# Patient Record
Sex: MALE | Race: WHITE | NOT HISPANIC OR LATINO | Employment: OTHER | URBAN - METROPOLITAN AREA
[De-identification: names, ages, dates, MRNs, and addresses within clinical notes are randomized per-mention and may not be internally consistent; named-entity substitution may affect disease eponyms.]

---

## 2020-01-01 ENCOUNTER — APPOINTMENT (OUTPATIENT)
Dept: GENERAL RADIOLOGY | Facility: HOSPITAL | Age: 75
End: 2020-01-01

## 2020-01-01 ENCOUNTER — HOSPITAL ENCOUNTER (EMERGENCY)
Facility: HOSPITAL | Age: 75
Discharge: HOME | End: 2020-03-31
Attending: EMERGENCY MEDICINE | Admitting: EMERGENCY MEDICINE

## 2020-01-01 ENCOUNTER — HOSPITAL ENCOUNTER (EMERGENCY)
Facility: HOSPITAL | Age: 75
Discharge: HOME | End: 2020-03-29
Attending: EMERGENCY MEDICINE | Admitting: EMERGENCY MEDICINE

## 2020-01-01 ENCOUNTER — HOSPITAL ENCOUNTER (EMERGENCY)
Facility: HOSPITAL | Age: 75
Discharge: LEFT BEFORE DISCHARGE | End: 2020-04-15
Attending: EMERGENCY MEDICINE | Admitting: EMERGENCY MEDICINE

## 2020-01-01 ENCOUNTER — HOSPITAL ENCOUNTER (EMERGENCY)
Facility: HOSPITAL | Age: 75
Discharge: HOME | End: 2020-04-18
Attending: EMERGENCY MEDICINE | Admitting: EMERGENCY MEDICINE

## 2020-01-01 ENCOUNTER — HOSPITAL ENCOUNTER (EMERGENCY)
Facility: HOSPITAL | Age: 75
Discharge: HOME | End: 2020-03-26
Attending: EMERGENCY MEDICINE | Admitting: EMERGENCY MEDICINE

## 2020-01-01 ENCOUNTER — HOSPITAL ENCOUNTER (EMERGENCY)
Facility: HOSPITAL | Age: 75
Discharge: HOME | End: 2020-04-17
Attending: EMERGENCY MEDICINE | Admitting: EMERGENCY MEDICINE

## 2020-01-01 ENCOUNTER — HOSPITAL ENCOUNTER (INPATIENT)
Facility: HOSPITAL | Age: 75
LOS: 7 days | Discharge: HOME HEALTH | End: 2020-03-16
Attending: EMERGENCY MEDICINE | Admitting: HOSPITALIST

## 2020-01-01 ENCOUNTER — PATIENT OUTREACH (OUTPATIENT)
Dept: CASE MANAGEMENT | Facility: OTHER | Age: 75
End: 2020-01-01

## 2020-01-01 ENCOUNTER — APPOINTMENT (OUTPATIENT)
Dept: CT IMAGING | Facility: HOSPITAL | Age: 75
End: 2020-01-01

## 2020-01-01 ENCOUNTER — HOSPITAL ENCOUNTER (INPATIENT)
Facility: HOSPITAL | Age: 75
LOS: 16 days | Discharge: HOSPICE | End: 2020-05-09
Attending: EMERGENCY MEDICINE | Admitting: HOSPITALIST

## 2020-01-01 ENCOUNTER — EPISODE CHANGES (OUTPATIENT)
Dept: CASE MANAGEMENT | Facility: OTHER | Age: 75
End: 2020-01-01

## 2020-01-01 ENCOUNTER — HOSPITAL ENCOUNTER (INPATIENT)
Facility: HOSPITAL | Age: 75
LOS: 5 days | Discharge: HOME HEALTH | End: 2020-03-24
Attending: EMERGENCY MEDICINE | Admitting: INTERNAL MEDICINE

## 2020-01-01 ENCOUNTER — HOSPITAL ENCOUNTER (EMERGENCY)
Facility: HOSPITAL | Age: 75
Discharge: HOME | End: 2020-04-14
Attending: EMERGENCY MEDICINE | Admitting: EMERGENCY MEDICINE

## 2020-01-01 ENCOUNTER — HOSPITAL ENCOUNTER (EMERGENCY)
Facility: HOSPITAL | Age: 75
Discharge: HOME HEALTH | End: 2020-04-12
Attending: EMERGENCY MEDICINE | Admitting: EMERGENCY MEDICINE

## 2020-01-01 ENCOUNTER — HOSPITAL ENCOUNTER (EMERGENCY)
Facility: HOSPITAL | Age: 75
Discharge: HOME | End: 2020-04-16
Attending: EMERGENCY MEDICINE

## 2020-01-01 ENCOUNTER — READMISSION MANAGEMENT (OUTPATIENT)
Dept: CALL CENTER | Facility: HOSPITAL | Age: 75
End: 2020-01-01

## 2020-01-01 ENCOUNTER — DOCUMENTATION (OUTPATIENT)
Dept: SOCIAL WORK | Facility: HOSPITAL | Age: 75
End: 2020-01-01

## 2020-01-01 ENCOUNTER — HOSPITAL ENCOUNTER (EMERGENCY)
Facility: HOSPITAL | Age: 75
Discharge: LEFT BEFORE DISCHARGE | End: 2020-04-20

## 2020-01-01 ENCOUNTER — ANESTHESIA (OUTPATIENT)
Dept: GASTROENTEROLOGY | Facility: HOSPITAL | Age: 75
End: 2020-01-01

## 2020-01-01 ENCOUNTER — HOSPITAL ENCOUNTER (EMERGENCY)
Facility: HOSPITAL | Age: 75
Discharge: HOME | End: 2020-04-08
Attending: EMERGENCY MEDICINE | Admitting: EMERGENCY MEDICINE

## 2020-01-01 ENCOUNTER — HOSPITAL ENCOUNTER (INPATIENT)
Facility: HOSPITAL | Age: 75
LOS: 2 days | End: 2020-05-11
Attending: INTERNAL MEDICINE | Admitting: INTERNAL MEDICINE

## 2020-01-01 ENCOUNTER — ANESTHESIA EVENT (OUTPATIENT)
Dept: GASTROENTEROLOGY | Facility: HOSPITAL | Age: 75
End: 2020-01-01

## 2020-01-01 VITALS
SYSTOLIC BLOOD PRESSURE: 118 MMHG | TEMPERATURE: 97.3 F | OXYGEN SATURATION: 97 % | HEART RATE: 65 BPM | RESPIRATION RATE: 18 BRPM | DIASTOLIC BLOOD PRESSURE: 65 MMHG

## 2020-01-01 VITALS
WEIGHT: 165 LBS | RESPIRATION RATE: 18 BRPM | HEART RATE: 73 BPM | DIASTOLIC BLOOD PRESSURE: 67 MMHG | BODY MASS INDEX: 23.62 KG/M2 | HEIGHT: 70 IN | SYSTOLIC BLOOD PRESSURE: 126 MMHG | TEMPERATURE: 99.1 F | OXYGEN SATURATION: 100 %

## 2020-01-01 VITALS
RESPIRATION RATE: 16 BRPM | SYSTOLIC BLOOD PRESSURE: 116 MMHG | HEIGHT: 70 IN | DIASTOLIC BLOOD PRESSURE: 58 MMHG | TEMPERATURE: 97.9 F | HEART RATE: 66 BPM | BODY MASS INDEX: 18.07 KG/M2 | OXYGEN SATURATION: 83 % | WEIGHT: 126.2 LBS

## 2020-01-01 VITALS
DIASTOLIC BLOOD PRESSURE: 74 MMHG | TEMPERATURE: 97.9 F | HEART RATE: 74 BPM | RESPIRATION RATE: 16 BRPM | SYSTOLIC BLOOD PRESSURE: 143 MMHG | OXYGEN SATURATION: 99 %

## 2020-01-01 VITALS
BODY MASS INDEX: 21.66 KG/M2 | TEMPERATURE: 98.4 F | OXYGEN SATURATION: 99 % | SYSTOLIC BLOOD PRESSURE: 102 MMHG | WEIGHT: 130 LBS | HEIGHT: 65 IN | RESPIRATION RATE: 20 BRPM | DIASTOLIC BLOOD PRESSURE: 55 MMHG | HEART RATE: 89 BPM

## 2020-01-01 VITALS
RESPIRATION RATE: 18 BRPM | SYSTOLIC BLOOD PRESSURE: 148 MMHG | WEIGHT: 117.7 LBS | HEART RATE: 79 BPM | OXYGEN SATURATION: 99 % | BODY MASS INDEX: 16.85 KG/M2 | TEMPERATURE: 98.6 F | HEIGHT: 70 IN | DIASTOLIC BLOOD PRESSURE: 80 MMHG

## 2020-01-01 VITALS
HEART RATE: 74 BPM | RESPIRATION RATE: 18 BRPM | BODY MASS INDEX: 23.62 KG/M2 | WEIGHT: 165 LBS | TEMPERATURE: 97.8 F | OXYGEN SATURATION: 95 % | DIASTOLIC BLOOD PRESSURE: 75 MMHG | HEIGHT: 70 IN | SYSTOLIC BLOOD PRESSURE: 134 MMHG

## 2020-01-01 VITALS
BODY MASS INDEX: 27.49 KG/M2 | RESPIRATION RATE: 18 BRPM | OXYGEN SATURATION: 99 % | TEMPERATURE: 98 F | HEART RATE: 70 BPM | SYSTOLIC BLOOD PRESSURE: 118 MMHG | DIASTOLIC BLOOD PRESSURE: 66 MMHG | HEIGHT: 65 IN | WEIGHT: 165 LBS

## 2020-01-01 VITALS
HEART RATE: 110 BPM | OXYGEN SATURATION: 92 % | DIASTOLIC BLOOD PRESSURE: 66 MMHG | TEMPERATURE: 103.9 F | SYSTOLIC BLOOD PRESSURE: 137 MMHG | RESPIRATION RATE: 20 BRPM

## 2020-01-01 VITALS
HEART RATE: 68 BPM | TEMPERATURE: 97.1 F | RESPIRATION RATE: 18 BRPM | OXYGEN SATURATION: 96 % | DIASTOLIC BLOOD PRESSURE: 97 MMHG | SYSTOLIC BLOOD PRESSURE: 107 MMHG

## 2020-01-01 VITALS
HEIGHT: 70 IN | OXYGEN SATURATION: 97 % | SYSTOLIC BLOOD PRESSURE: 126 MMHG | HEART RATE: 74 BPM | TEMPERATURE: 96.3 F | BODY MASS INDEX: 18.04 KG/M2 | RESPIRATION RATE: 18 BRPM | DIASTOLIC BLOOD PRESSURE: 68 MMHG | WEIGHT: 126 LBS

## 2020-01-01 VITALS
TEMPERATURE: 98.6 F | RESPIRATION RATE: 20 BRPM | HEART RATE: 64 BPM | OXYGEN SATURATION: 100 % | SYSTOLIC BLOOD PRESSURE: 133 MMHG | DIASTOLIC BLOOD PRESSURE: 72 MMHG

## 2020-01-01 VITALS
OXYGEN SATURATION: 90 % | SYSTOLIC BLOOD PRESSURE: 145 MMHG | HEIGHT: 65 IN | RESPIRATION RATE: 18 BRPM | DIASTOLIC BLOOD PRESSURE: 72 MMHG | TEMPERATURE: 97.6 F | HEART RATE: 89 BPM | BODY MASS INDEX: 21.77 KG/M2 | WEIGHT: 130.7 LBS

## 2020-01-01 VITALS
SYSTOLIC BLOOD PRESSURE: 118 MMHG | RESPIRATION RATE: 18 BRPM | HEART RATE: 60 BPM | DIASTOLIC BLOOD PRESSURE: 69 MMHG | BODY MASS INDEX: 18.04 KG/M2 | TEMPERATURE: 97.5 F | OXYGEN SATURATION: 100 % | WEIGHT: 126 LBS | HEIGHT: 70 IN

## 2020-01-01 VITALS
DIASTOLIC BLOOD PRESSURE: 79 MMHG | HEART RATE: 76 BPM | TEMPERATURE: 98.8 F | SYSTOLIC BLOOD PRESSURE: 136 MMHG | OXYGEN SATURATION: 99 % | RESPIRATION RATE: 16 BRPM

## 2020-01-01 DIAGNOSIS — S00.83XA CONTUSION OF FOREHEAD, INITIAL ENCOUNTER: ICD-10-CM

## 2020-01-01 DIAGNOSIS — Z87.828 HISTORY OF GUNSHOT WOUND: ICD-10-CM

## 2020-01-01 DIAGNOSIS — E87.1 HYPONATREMIA: ICD-10-CM

## 2020-01-01 DIAGNOSIS — D64.9 CHRONIC ANEMIA: ICD-10-CM

## 2020-01-01 DIAGNOSIS — Z98.890 H/O TRACHEOSTOMY: ICD-10-CM

## 2020-01-01 DIAGNOSIS — E87.0 HYPERNATREMIA: ICD-10-CM

## 2020-01-01 DIAGNOSIS — T79.6XXA TRAUMATIC RHABDOMYOLYSIS, INITIAL ENCOUNTER (HCC): ICD-10-CM

## 2020-01-01 DIAGNOSIS — R10.9 ACUTE ABDOMINAL PAIN: Primary | ICD-10-CM
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
## 2020-01-01 DIAGNOSIS — J06.9 UPPER RESPIRATORY TRACT INFECTION, UNSPECIFIED TYPE: ICD-10-CM

## 2020-01-01 DIAGNOSIS — E87.1 HYPONATREMIA: Primary | ICD-10-CM

## 2020-01-01 DIAGNOSIS — S70.02XA CONTUSION OF LEFT HIP, INITIAL ENCOUNTER: ICD-10-CM

## 2020-01-01 DIAGNOSIS — R53.1 GENERAL WEAKNESS: ICD-10-CM

## 2020-01-01 DIAGNOSIS — R29.6 FREQUENT FALLS: Primary | ICD-10-CM

## 2020-01-01 DIAGNOSIS — D50.0 IRON DEFICIENCY ANEMIA DUE TO CHRONIC BLOOD LOSS: ICD-10-CM

## 2020-01-01 DIAGNOSIS — R07.89 CHEST WALL PAIN: Primary | ICD-10-CM

## 2020-01-01 DIAGNOSIS — S00.03XA CONTUSION OF SCALP, INITIAL ENCOUNTER: Primary | ICD-10-CM

## 2020-01-01 DIAGNOSIS — R10.12 LEFT UPPER QUADRANT PAIN: Primary | ICD-10-CM

## 2020-01-01 DIAGNOSIS — T07.XXXA ABRASIONS OF MULTIPLE SITES: ICD-10-CM

## 2020-01-01 DIAGNOSIS — R10.84 GENERALIZED ABDOMINAL PAIN: Primary | ICD-10-CM

## 2020-01-01 DIAGNOSIS — Z43.1 ATTENTION TO GASTROSTOMY TUBE (HCC): Primary | ICD-10-CM
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
## 2020-01-01 DIAGNOSIS — R53.1 GENERALIZED WEAKNESS: ICD-10-CM

## 2020-01-01 DIAGNOSIS — M25.552 ACUTE HIP PAIN, LEFT: Primary | ICD-10-CM

## 2020-01-01 DIAGNOSIS — D64.9 ANEMIA, UNSPECIFIED TYPE: Primary | ICD-10-CM

## 2020-01-01 DIAGNOSIS — K56.41 IMPACTED STOOL IN RECTUM (HCC): ICD-10-CM

## 2020-01-01 DIAGNOSIS — R53.1 GENERALIZED WEAKNESS: Primary | ICD-10-CM

## 2020-01-01 DIAGNOSIS — S51.811A SKIN TEAR OF RIGHT FOREARM WITHOUT COMPLICATION, INITIAL ENCOUNTER: ICD-10-CM

## 2020-01-01 LAB
25(OH)D3 SERPL-MCNC: 29.9 NG/ML (ref 30–100)
ABO + RH BLD: NORMAL
ABO GROUP BLD: NORMAL
ACANTHOCYTES BLD QL SMEAR: ABNORMAL
ACANTHOCYTES BLD QL SMEAR: ABNORMAL
ALBUMIN SERPL-MCNC: 2.1 G/DL (ref 3.5–5.2)
ALBUMIN SERPL-MCNC: 2.3 G/DL (ref 3.5–5.2)
ALBUMIN SERPL-MCNC: 2.5 G/DL (ref 3.5–5.2)
ALBUMIN SERPL-MCNC: 2.6 G/DL (ref 3.5–5.2)
ALBUMIN SERPL-MCNC: 2.6 G/DL (ref 3.5–5.2)
ALBUMIN SERPL-MCNC: 2.8 G/DL (ref 3.5–5.2)
ALBUMIN SERPL-MCNC: 3.5 G/DL (ref 3.5–5.2)
ALBUMIN SERPL-MCNC: 3.6 G/DL (ref 3.5–5.2)
ALBUMIN SERPL-MCNC: 3.8 G/DL (ref 3.5–5.2)
ALBUMIN SERPL-MCNC: 3.9 G/DL (ref 3.5–5.2)
ALBUMIN SERPL-MCNC: 4 G/DL (ref 3.5–5.2)
ALBUMIN SERPL-MCNC: 4.1 G/DL (ref 3.5–5.2)
ALBUMIN SERPL-MCNC: 4.2 G/DL (ref 3.5–5.2)
ALBUMIN SERPL-MCNC: 4.2 G/DL (ref 3.5–5.2)
ALBUMIN/GLOB SERPL: 0.9 G/DL
ALBUMIN/GLOB SERPL: 1.1 G/DL
ALBUMIN/GLOB SERPL: 1.2 G/DL
ALBUMIN/GLOB SERPL: 1.3 G/DL
ALBUMIN/GLOB SERPL: 1.3 G/DL
ALBUMIN/GLOB SERPL: 1.6 G/DL
ALBUMIN/GLOB SERPL: 1.7 G/DL
ALBUMIN/GLOB SERPL: 2 G/DL
ALP SERPL-CCNC: 100 U/L (ref 39–117)
ALP SERPL-CCNC: 114 U/L (ref 39–117)
ALP SERPL-CCNC: 122 U/L (ref 39–117)
ALP SERPL-CCNC: 131 U/L (ref 39–117)
ALP SERPL-CCNC: 136 U/L (ref 39–117)
ALP SERPL-CCNC: 154 U/L (ref 39–117)
ALP SERPL-CCNC: 79 U/L (ref 39–117)
ALP SERPL-CCNC: 81 U/L (ref 39–117)
ALP SERPL-CCNC: 83 U/L (ref 39–117)
ALP SERPL-CCNC: 94 U/L (ref 39–117)
ALT SERPL W P-5'-P-CCNC: 13 U/L (ref 1–41)
ALT SERPL W P-5'-P-CCNC: 14 U/L (ref 1–41)
ALT SERPL W P-5'-P-CCNC: 15 U/L (ref 1–41)
ALT SERPL W P-5'-P-CCNC: 15 U/L (ref 1–41)
ALT SERPL W P-5'-P-CCNC: 17 U/L (ref 1–41)
ALT SERPL W P-5'-P-CCNC: 18 U/L (ref 1–41)
ALT SERPL W P-5'-P-CCNC: 18 U/L (ref 1–41)
ALT SERPL W P-5'-P-CCNC: 32 U/L (ref 1–41)
ALT SERPL W P-5'-P-CCNC: 34 U/L (ref 1–41)
ALT SERPL W P-5'-P-CCNC: 50 U/L (ref 1–41)
ANION GAP SERPL CALCULATED.3IONS-SCNC: 10.1 MMOL/L (ref 5–15)
ANION GAP SERPL CALCULATED.3IONS-SCNC: 10.6 MMOL/L (ref 5–15)
ANION GAP SERPL CALCULATED.3IONS-SCNC: 10.7 MMOL/L (ref 5–15)
ANION GAP SERPL CALCULATED.3IONS-SCNC: 10.8 MMOL/L (ref 5–15)
ANION GAP SERPL CALCULATED.3IONS-SCNC: 10.8 MMOL/L (ref 5–15)
ANION GAP SERPL CALCULATED.3IONS-SCNC: 10.9 MMOL/L (ref 5–15)
ANION GAP SERPL CALCULATED.3IONS-SCNC: 11.4 MMOL/L (ref 5–15)
ANION GAP SERPL CALCULATED.3IONS-SCNC: 11.5 MMOL/L (ref 5–15)
ANION GAP SERPL CALCULATED.3IONS-SCNC: 11.7 MMOL/L (ref 5–15)
ANION GAP SERPL CALCULATED.3IONS-SCNC: 11.9 MMOL/L (ref 5–15)
ANION GAP SERPL CALCULATED.3IONS-SCNC: 11.9 MMOL/L (ref 5–15)
ANION GAP SERPL CALCULATED.3IONS-SCNC: 12.1 MMOL/L (ref 5–15)
ANION GAP SERPL CALCULATED.3IONS-SCNC: 12.5 MMOL/L (ref 5–15)
ANION GAP SERPL CALCULATED.3IONS-SCNC: 12.6 MMOL/L (ref 5–15)
ANION GAP SERPL CALCULATED.3IONS-SCNC: 13.7 MMOL/L (ref 5–15)
ANION GAP SERPL CALCULATED.3IONS-SCNC: 15.7 MMOL/L (ref 5–15)
ANION GAP SERPL CALCULATED.3IONS-SCNC: 16.1 MMOL/L (ref 5–15)
ANION GAP SERPL CALCULATED.3IONS-SCNC: 16.6 MMOL/L (ref 5–15)
ANION GAP SERPL CALCULATED.3IONS-SCNC: 21.1 MMOL/L (ref 5–15)
ANION GAP SERPL CALCULATED.3IONS-SCNC: 5.2 MMOL/L (ref 5–15)
ANION GAP SERPL CALCULATED.3IONS-SCNC: 6.1 MMOL/L (ref 5–15)
ANION GAP SERPL CALCULATED.3IONS-SCNC: 6.6 MMOL/L (ref 5–15)
ANION GAP SERPL CALCULATED.3IONS-SCNC: 6.6 MMOL/L (ref 5–15)
ANION GAP SERPL CALCULATED.3IONS-SCNC: 7.5 MMOL/L (ref 5–15)
ANION GAP SERPL CALCULATED.3IONS-SCNC: 8.1 MMOL/L (ref 5–15)
ANION GAP SERPL CALCULATED.3IONS-SCNC: 9 MMOL/L (ref 5–15)
ANION GAP SERPL CALCULATED.3IONS-SCNC: 9.1 MMOL/L (ref 5–15)
ANION GAP SERPL CALCULATED.3IONS-SCNC: 9.1 MMOL/L (ref 5–15)
ANION GAP SERPL CALCULATED.3IONS-SCNC: 9.3 MMOL/L (ref 5–15)
ANION GAP SERPL CALCULATED.3IONS-SCNC: 9.3 MMOL/L (ref 5–15)
ANION GAP SERPL CALCULATED.3IONS-SCNC: 9.4 MMOL/L (ref 5–15)
ANION GAP SERPL CALCULATED.3IONS-SCNC: 9.5 MMOL/L (ref 5–15)
ANION GAP SERPL CALCULATED.3IONS-SCNC: 9.7 MMOL/L (ref 5–15)
ANION GAP SERPL CALCULATED.3IONS-SCNC: 9.8 MMOL/L (ref 5–15)
ANION GAP SERPL CALCULATED.3IONS-SCNC: 9.9 MMOL/L (ref 5–15)
ANION GAP SERPL CALCULATED.3IONS-SCNC: 9.9 MMOL/L (ref 5–15)
ANISOCYTOSIS BLD QL: ABNORMAL
ANISOCYTOSIS BLD QL: NORMAL
ANISOCYTOSIS BLD QL: NORMAL
ANTI-C: NORMAL
ANTI-D: NORMAL
ARTERIAL PATENCY WRIST A: POSITIVE
ARTERIAL PATENCY WRIST A: POSITIVE
AST SERPL-CCNC: 145 U/L (ref 1–40)
AST SERPL-CCNC: 18 U/L (ref 1–40)
AST SERPL-CCNC: 19 U/L (ref 1–40)
AST SERPL-CCNC: 20 U/L (ref 1–40)
AST SERPL-CCNC: 21 U/L (ref 1–40)
AST SERPL-CCNC: 27 U/L (ref 1–40)
AST SERPL-CCNC: 28 U/L (ref 1–40)
AST SERPL-CCNC: 44 U/L (ref 1–40)
ATMOSPHERIC PRESS: 743.9 MMHG
ATMOSPHERIC PRESS: 748.9 MMHG
B PARAPERT DNA SPEC QL NAA+PROBE: NOT DETECTED
B PERT DNA SPEC QL NAA+PROBE: NOT DETECTED
BACTERIA SPEC AEROBE CULT: NO GROWTH
BACTERIA SPEC AEROBE CULT: NORMAL
BACTERIA SPEC AEROBE CULT: NORMAL
BACTERIA SPEC RESP CULT: ABNORMAL
BACTERIA UR QL AUTO: ABNORMAL /HPF
BACTERIA UR QL AUTO: ABNORMAL /HPF
BACTERIA UR QL AUTO: NORMAL /HPF
BASE EXCESS BLDA CALC-SCNC: 1.8 MMOL/L (ref 0–2)
BASE EXCESS BLDA CALC-SCNC: 1.9 MMOL/L (ref 0–2)
BASOPHILS # BLD AUTO: 0.03 10*3/MM3 (ref 0–0.2)
BASOPHILS # BLD AUTO: 0.04 10*3/MM3 (ref 0–0.2)
BASOPHILS # BLD AUTO: 0.05 10*3/MM3 (ref 0–0.2)
BASOPHILS # BLD AUTO: 0.07 10*3/MM3 (ref 0–0.2)
BASOPHILS # BLD AUTO: 0.09 10*3/MM3 (ref 0–0.2)
BASOPHILS # BLD MANUAL: 0.07 10*3/MM3 (ref 0–0.2)
BASOPHILS # BLD MANUAL: 0.13 10*3/MM3 (ref 0–0.2)
BASOPHILS # BLD MANUAL: 0.15 10*3/MM3 (ref 0–0.2)
BASOPHILS # BLD MANUAL: 0.24 10*3/MM3 (ref 0–0.2)
BASOPHILS # BLD MANUAL: 0.24 10*3/MM3 (ref 0–0.2)
BASOPHILS NFR BLD AUTO: 0.2 % (ref 0–1.5)
BASOPHILS NFR BLD AUTO: 0.3 % (ref 0–1.5)
BASOPHILS NFR BLD AUTO: 0.6 % (ref 0–1.5)
BASOPHILS NFR BLD AUTO: 0.6 % (ref 0–1.5)
BASOPHILS NFR BLD AUTO: 1 % (ref 0–1.5)
BASOPHILS NFR BLD AUTO: 1 % (ref 0–1.5)
BASOPHILS NFR BLD AUTO: 2 % (ref 0–1.5)
BASOPHILS NFR BLD AUTO: 2 % (ref 0–1.5)
BASOPHILS NFR BLD AUTO: 3 % (ref 0–1.5)
BASOPHILS NFR BLD AUTO: 3 % (ref 0–1.5)
BDY SITE: ABNORMAL
BDY SITE: ABNORMAL
BH BB BLOOD EXPIRATION DATE: NORMAL
BH BB BLOOD TYPE BARCODE: 9500
BH BB DISPENSE STATUS: NORMAL
BH BB PRODUCT CODE: NORMAL
BH BB UNIT NUMBER: NORMAL
BILIRUB SERPL-MCNC: 0.3 MG/DL (ref 0.2–1.2)
BILIRUB SERPL-MCNC: 0.5 MG/DL (ref 0.2–1.2)
BILIRUB SERPL-MCNC: 0.5 MG/DL (ref 0.2–1.2)
BILIRUB SERPL-MCNC: 0.6 MG/DL (ref 0.2–1.2)
BILIRUB SERPL-MCNC: 0.7 MG/DL (ref 0.2–1.2)
BILIRUB SERPL-MCNC: 0.7 MG/DL (ref 0.2–1.2)
BILIRUB UR QL STRIP: NEGATIVE
BLD GP AB SCN SERPL QL: POSITIVE
BUN BLD-MCNC: 15 MG/DL (ref 8–23)
BUN BLD-MCNC: 16 MG/DL (ref 8–23)
BUN BLD-MCNC: 17 MG/DL (ref 8–23)
BUN BLD-MCNC: 18 MG/DL (ref 8–23)
BUN BLD-MCNC: 18 MG/DL (ref 8–23)
BUN BLD-MCNC: 20 MG/DL (ref 8–23)
BUN BLD-MCNC: 21 MG/DL (ref 8–23)
BUN BLD-MCNC: 21 MG/DL (ref 8–23)
BUN BLD-MCNC: 22 MG/DL (ref 8–23)
BUN BLD-MCNC: 23 MG/DL (ref 8–23)
BUN BLD-MCNC: 24 MG/DL (ref 8–23)
BUN BLD-MCNC: 25 MG/DL (ref 8–23)
BUN BLD-MCNC: 25 MG/DL (ref 8–23)
BUN BLD-MCNC: 27 MG/DL (ref 8–23)
BUN BLD-MCNC: 29 MG/DL (ref 8–23)
BUN BLD-MCNC: 30 MG/DL (ref 8–23)
BUN BLD-MCNC: 31 MG/DL (ref 8–23)
BUN BLD-MCNC: 31 MG/DL (ref 8–23)
BUN BLD-MCNC: 32 MG/DL (ref 8–23)
BUN BLD-MCNC: 33 MG/DL (ref 8–23)
BUN BLD-MCNC: 34 MG/DL (ref 8–23)
BUN BLD-MCNC: 37 MG/DL (ref 8–23)
BUN BLD-MCNC: 37 MG/DL (ref 8–23)
BUN BLD-MCNC: 39 MG/DL (ref 8–23)
BUN BLD-MCNC: 41 MG/DL (ref 8–23)
BUN BLD-MCNC: 42 MG/DL (ref 8–23)
BUN BLD-MCNC: 43 MG/DL (ref 8–23)
BUN BLD-MCNC: 47 MG/DL (ref 8–23)
BUN BLD-MCNC: 50 MG/DL (ref 8–23)
BUN BLD-MCNC: 51 MG/DL (ref 8–23)
BUN BLD-MCNC: 54 MG/DL (ref 8–23)
BUN BLD-MCNC: 58 MG/DL (ref 8–23)
BUN BLD-MCNC: 60 MG/DL (ref 8–23)
BUN BLD-MCNC: 62 MG/DL (ref 8–23)
BUN BLD-MCNC: 70 MG/DL (ref 8–23)
BUN BLD-MCNC: 84 MG/DL (ref 8–23)
BUN/CREAT SERPL: 100 (ref 7–25)
BUN/CREAT SERPL: 30.6 (ref 7–25)
BUN/CREAT SERPL: 34.3 (ref 7–25)
BUN/CREAT SERPL: 37.8 (ref 7–25)
BUN/CREAT SERPL: 38.1 (ref 7–25)
BUN/CREAT SERPL: 39 (ref 7–25)
BUN/CREAT SERPL: 40 (ref 7–25)
BUN/CREAT SERPL: 40.2 (ref 7–25)
BUN/CREAT SERPL: 40.3 (ref 7–25)
BUN/CREAT SERPL: 42.4 (ref 7–25)
BUN/CREAT SERPL: 43.5 (ref 7–25)
BUN/CREAT SERPL: 45.8 (ref 7–25)
BUN/CREAT SERPL: 46.2 (ref 7–25)
BUN/CREAT SERPL: 46.2 (ref 7–25)
BUN/CREAT SERPL: 50 (ref 7–25)
BUN/CREAT SERPL: 54 (ref 7–25)
BUN/CREAT SERPL: 54.5 (ref 7–25)
BUN/CREAT SERPL: 55.3 (ref 7–25)
BUN/CREAT SERPL: 57.4 (ref 7–25)
BUN/CREAT SERPL: 57.4 (ref 7–25)
BUN/CREAT SERPL: 58.3 (ref 7–25)
BUN/CREAT SERPL: 59 (ref 7–25)
BUN/CREAT SERPL: 59.2 (ref 7–25)
BUN/CREAT SERPL: 60 (ref 7–25)
BUN/CREAT SERPL: 61.2 (ref 7–25)
BUN/CREAT SERPL: 68.9 (ref 7–25)
BUN/CREAT SERPL: 73.9 (ref 7–25)
BUN/CREAT SERPL: 76.2 (ref 7–25)
BUN/CREAT SERPL: 77.1 (ref 7–25)
BUN/CREAT SERPL: 77.5 (ref 7–25)
BUN/CREAT SERPL: 77.8 (ref 7–25)
BUN/CREAT SERPL: 78.2 (ref 7–25)
BUN/CREAT SERPL: 82 (ref 7–25)
BUN/CREAT SERPL: 83.9 (ref 7–25)
BUN/CREAT SERPL: 84.4 (ref 7–25)
BUN/CREAT SERPL: 85.4 (ref 7–25)
BUN/CREAT SERPL: 87.7 (ref 7–25)
BUN/CREAT SERPL: 95.2 (ref 7–25)
BURR CELLS BLD QL SMEAR: ABNORMAL
C AG RBC QL: NEGATIVE
C PNEUM DNA NPH QL NAA+NON-PROBE: NOT DETECTED
C3 FRG RBC-MCNC: NORMAL
CALCIUM SPEC-SCNC: 7.7 MG/DL (ref 8.6–10.5)
CALCIUM SPEC-SCNC: 8 MG/DL (ref 8.6–10.5)
CALCIUM SPEC-SCNC: 8.1 MG/DL (ref 8.6–10.5)
CALCIUM SPEC-SCNC: 8.2 MG/DL (ref 8.6–10.5)
CALCIUM SPEC-SCNC: 8.3 MG/DL (ref 8.6–10.5)
CALCIUM SPEC-SCNC: 8.4 MG/DL (ref 8.6–10.5)
CALCIUM SPEC-SCNC: 8.5 MG/DL (ref 8.6–10.5)
CALCIUM SPEC-SCNC: 8.6 MG/DL (ref 8.6–10.5)
CALCIUM SPEC-SCNC: 8.7 MG/DL (ref 8.6–10.5)
CALCIUM SPEC-SCNC: 8.7 MG/DL (ref 8.6–10.5)
CALCIUM SPEC-SCNC: 8.8 MG/DL (ref 8.6–10.5)
CALCIUM SPEC-SCNC: 8.9 MG/DL (ref 8.6–10.5)
CALCIUM SPEC-SCNC: 9 MG/DL (ref 8.6–10.5)
CALCIUM SPEC-SCNC: 9.1 MG/DL (ref 8.6–10.5)
CALCIUM SPEC-SCNC: 9.1 MG/DL (ref 8.6–10.5)
CALCIUM SPEC-SCNC: 9.3 MG/DL (ref 8.6–10.5)
CALCIUM SPEC-SCNC: 9.4 MG/DL (ref 8.6–10.5)
CHLORIDE SERPL-SCNC: 100 MMOL/L (ref 98–107)
CHLORIDE SERPL-SCNC: 101 MMOL/L (ref 98–107)
CHLORIDE SERPL-SCNC: 101 MMOL/L (ref 98–107)
CHLORIDE SERPL-SCNC: 102 MMOL/L (ref 98–107)
CHLORIDE SERPL-SCNC: 103 MMOL/L (ref 98–107)
CHLORIDE SERPL-SCNC: 104 MMOL/L (ref 98–107)
CHLORIDE SERPL-SCNC: 105 MMOL/L (ref 98–107)
CHLORIDE SERPL-SCNC: 106 MMOL/L (ref 98–107)
CHLORIDE SERPL-SCNC: 107 MMOL/L (ref 98–107)
CHLORIDE SERPL-SCNC: 107 MMOL/L (ref 98–107)
CHLORIDE SERPL-SCNC: 108 MMOL/L (ref 98–107)
CHLORIDE SERPL-SCNC: 110 MMOL/L (ref 98–107)
CHLORIDE SERPL-SCNC: 111 MMOL/L (ref 98–107)
CHLORIDE SERPL-SCNC: 113 MMOL/L (ref 98–107)
CHLORIDE SERPL-SCNC: 118 MMOL/L (ref 98–107)
CHLORIDE SERPL-SCNC: 121 MMOL/L (ref 98–107)
CHLORIDE SERPL-SCNC: 124 MMOL/L (ref 98–107)
CHLORIDE SERPL-SCNC: 127 MMOL/L (ref 98–107)
CHLORIDE SERPL-SCNC: 128 MMOL/L (ref 98–107)
CHLORIDE SERPL-SCNC: 129 MMOL/L (ref 98–107)
CHLORIDE SERPL-SCNC: 129 MMOL/L (ref 98–107)
CHLORIDE SERPL-SCNC: 133 MMOL/L (ref 98–107)
CHLORIDE SERPL-SCNC: 87 MMOL/L (ref 98–107)
CHLORIDE SERPL-SCNC: 95 MMOL/L (ref 98–107)
CHLORIDE SERPL-SCNC: 95 MMOL/L (ref 98–107)
CHLORIDE SERPL-SCNC: 96 MMOL/L (ref 98–107)
CHLORIDE SERPL-SCNC: 98 MMOL/L (ref 98–107)
CHLORIDE SERPL-SCNC: 99 MMOL/L (ref 98–107)
CHLORIDE SERPL-SCNC: 99 MMOL/L (ref 98–107)
CK SERPL-CCNC: 101 U/L (ref 20–200)
CK SERPL-CCNC: 233 U/L (ref 20–200)
CK SERPL-CCNC: 2672 U/L (ref 20–200)
CK SERPL-CCNC: 297 U/L (ref 20–200)
CK SERPL-CCNC: 4556 U/L (ref 20–200)
CK SERPL-CCNC: 661 U/L (ref 20–200)
CLARITY UR: ABNORMAL
CLARITY UR: ABNORMAL
CLARITY UR: CLEAR
CO2 SERPL-SCNC: 14.4 MMOL/L (ref 22–29)
CO2 SERPL-SCNC: 14.9 MMOL/L (ref 22–29)
CO2 SERPL-SCNC: 15.3 MMOL/L (ref 22–29)
CO2 SERPL-SCNC: 15.9 MMOL/L (ref 22–29)
CO2 SERPL-SCNC: 20.9 MMOL/L (ref 22–29)
CO2 SERPL-SCNC: 21.4 MMOL/L (ref 22–29)
CO2 SERPL-SCNC: 21.7 MMOL/L (ref 22–29)
CO2 SERPL-SCNC: 21.9 MMOL/L (ref 22–29)
CO2 SERPL-SCNC: 21.9 MMOL/L (ref 22–29)
CO2 SERPL-SCNC: 22 MMOL/L (ref 22–29)
CO2 SERPL-SCNC: 22.9 MMOL/L (ref 22–29)
CO2 SERPL-SCNC: 23.4 MMOL/L (ref 22–29)
CO2 SERPL-SCNC: 24.1 MMOL/L (ref 22–29)
CO2 SERPL-SCNC: 24.3 MMOL/L (ref 22–29)
CO2 SERPL-SCNC: 24.4 MMOL/L (ref 22–29)
CO2 SERPL-SCNC: 24.5 MMOL/L (ref 22–29)
CO2 SERPL-SCNC: 24.6 MMOL/L (ref 22–29)
CO2 SERPL-SCNC: 24.7 MMOL/L (ref 22–29)
CO2 SERPL-SCNC: 24.9 MMOL/L (ref 22–29)
CO2 SERPL-SCNC: 25.1 MMOL/L (ref 22–29)
CO2 SERPL-SCNC: 25.2 MMOL/L (ref 22–29)
CO2 SERPL-SCNC: 25.3 MMOL/L (ref 22–29)
CO2 SERPL-SCNC: 25.3 MMOL/L (ref 22–29)
CO2 SERPL-SCNC: 25.4 MMOL/L (ref 22–29)
CO2 SERPL-SCNC: 25.5 MMOL/L (ref 22–29)
CO2 SERPL-SCNC: 25.8 MMOL/L (ref 22–29)
CO2 SERPL-SCNC: 26.1 MMOL/L (ref 22–29)
CO2 SERPL-SCNC: 26.1 MMOL/L (ref 22–29)
CO2 SERPL-SCNC: 26.3 MMOL/L (ref 22–29)
CO2 SERPL-SCNC: 26.5 MMOL/L (ref 22–29)
CO2 SERPL-SCNC: 26.9 MMOL/L (ref 22–29)
CO2 SERPL-SCNC: 27.5 MMOL/L (ref 22–29)
CO2 SERPL-SCNC: 28.4 MMOL/L (ref 22–29)
CO2 SERPL-SCNC: 28.4 MMOL/L (ref 22–29)
CO2 SERPL-SCNC: 30.2 MMOL/L (ref 22–29)
CO2 SERPL-SCNC: 32.2 MMOL/L (ref 22–29)
CO2 SERPL-SCNC: 32.6 MMOL/L (ref 22–29)
CO2 SERPL-SCNC: 33.1 MMOL/L (ref 22–29)
COLOR UR: ABNORMAL
COLOR UR: YELLOW
CREAT BLD-MCNC: 0.38 MG/DL (ref 0.76–1.27)
CREAT BLD-MCNC: 0.39 MG/DL (ref 0.76–1.27)
CREAT BLD-MCNC: 0.39 MG/DL (ref 0.76–1.27)
CREAT BLD-MCNC: 0.41 MG/DL (ref 0.76–1.27)
CREAT BLD-MCNC: 0.42 MG/DL (ref 0.76–1.27)
CREAT BLD-MCNC: 0.42 MG/DL (ref 0.76–1.27)
CREAT BLD-MCNC: 0.45 MG/DL (ref 0.76–1.27)
CREAT BLD-MCNC: 0.46 MG/DL (ref 0.76–1.27)
CREAT BLD-MCNC: 0.47 MG/DL (ref 0.76–1.27)
CREAT BLD-MCNC: 0.48 MG/DL (ref 0.76–1.27)
CREAT BLD-MCNC: 0.48 MG/DL (ref 0.76–1.27)
CREAT BLD-MCNC: 0.49 MG/DL (ref 0.76–1.27)
CREAT BLD-MCNC: 0.5 MG/DL (ref 0.76–1.27)
CREAT BLD-MCNC: 0.54 MG/DL (ref 0.76–1.27)
CREAT BLD-MCNC: 0.55 MG/DL (ref 0.76–1.27)
CREAT BLD-MCNC: 0.55 MG/DL (ref 0.76–1.27)
CREAT BLD-MCNC: 0.56 MG/DL (ref 0.76–1.27)
CREAT BLD-MCNC: 0.57 MG/DL (ref 0.76–1.27)
CREAT BLD-MCNC: 0.58 MG/DL (ref 0.76–1.27)
CREAT BLD-MCNC: 0.59 MG/DL (ref 0.76–1.27)
CREAT BLD-MCNC: 0.62 MG/DL (ref 0.76–1.27)
CREAT BLD-MCNC: 0.63 MG/DL (ref 0.76–1.27)
CREAT BLD-MCNC: 0.63 MG/DL (ref 0.76–1.27)
CREAT BLD-MCNC: 0.64 MG/DL (ref 0.76–1.27)
CREAT BLD-MCNC: 0.65 MG/DL (ref 0.76–1.27)
CREAT BLD-MCNC: 0.65 MG/DL (ref 0.76–1.27)
CREAT BLD-MCNC: 0.69 MG/DL (ref 0.76–1.27)
CREAT BLD-MCNC: 0.7 MG/DL (ref 0.76–1.27)
CREAT BLD-MCNC: 0.72 MG/DL (ref 0.76–1.27)
CREAT BLD-MCNC: 0.8 MG/DL (ref 0.76–1.27)
CREAT BLD-MCNC: 0.82 MG/DL (ref 0.76–1.27)
CREAT BLD-MCNC: 0.82 MG/DL (ref 0.76–1.27)
CREAT BLD-MCNC: 0.97 MG/DL (ref 0.76–1.27)
CREAT BLD-MCNC: 1.08 MG/DL (ref 0.76–1.27)
CROSSMATCH INTERPRETATION: NORMAL
CYTO UR: NORMAL
D-LACTATE SERPL-SCNC: 0.6 MMOL/L (ref 0.5–2)
D-LACTATE SERPL-SCNC: 1.2 MMOL/L (ref 0.5–2)
DACRYOCYTES BLD QL SMEAR: ABNORMAL
DEPRECATED RDW RBC AUTO: 40.1 FL (ref 37–54)
DEPRECATED RDW RBC AUTO: 40.4 FL (ref 37–54)
DEPRECATED RDW RBC AUTO: 47.1 FL (ref 37–54)
DEPRECATED RDW RBC AUTO: 53.3 FL (ref 37–54)
DEPRECATED RDW RBC AUTO: 54.1 FL (ref 37–54)
DEPRECATED RDW RBC AUTO: 59.6 FL (ref 37–54)
DEPRECATED RDW RBC AUTO: 61.8 FL (ref 37–54)
DEPRECATED RDW RBC AUTO: 62.7 FL (ref 37–54)
DEPRECATED RDW RBC AUTO: 63.5 FL (ref 37–54)
DEPRECATED RDW RBC AUTO: 64.1 FL (ref 37–54)
DEPRECATED RDW RBC AUTO: 64.2 FL (ref 37–54)
DEPRECATED RDW RBC AUTO: 64.6 FL (ref 37–54)
DEPRECATED RDW RBC AUTO: 64.7 FL (ref 37–54)
DEPRECATED RDW RBC AUTO: 65 FL (ref 37–54)
DEPRECATED RDW RBC AUTO: 65.2 FL (ref 37–54)
DEPRECATED RDW RBC AUTO: 66.1 FL (ref 37–54)
DEPRECATED RDW RBC AUTO: 68.7 FL (ref 37–54)
DEPRECATED RDW RBC AUTO: ABNORMAL FL
ELLIPTOCYTES BLD QL SMEAR: ABNORMAL
ELLIPTOCYTES BLD QL SMEAR: NORMAL
EOSINOPHIL # BLD AUTO: 0.05 10*3/MM3 (ref 0–0.4)
EOSINOPHIL # BLD AUTO: 0.08 10*3/MM3 (ref 0–0.4)
EOSINOPHIL # BLD AUTO: 0.12 10*3/MM3 (ref 0–0.4)
EOSINOPHIL # BLD AUTO: 0.12 10*3/MM3 (ref 0–0.4)
EOSINOPHIL # BLD AUTO: 0.2 10*3/MM3 (ref 0–0.4)
EOSINOPHIL # BLD MANUAL: 0.07 10*3/MM3 (ref 0–0.4)
EOSINOPHIL # BLD MANUAL: 0.08 10*3/MM3 (ref 0–0.4)
EOSINOPHIL # BLD MANUAL: 0.08 10*3/MM3 (ref 0–0.4)
EOSINOPHIL # BLD MANUAL: 0.09 10*3/MM3 (ref 0–0.4)
EOSINOPHIL # BLD MANUAL: 0.13 10*3/MM3 (ref 0–0.4)
EOSINOPHIL # BLD MANUAL: 0.2 10*3/MM3 (ref 0–0.4)
EOSINOPHIL NFR BLD AUTO: 0.5 % (ref 0.3–6.2)
EOSINOPHIL NFR BLD AUTO: 0.8 % (ref 0.3–6.2)
EOSINOPHIL NFR BLD AUTO: 1 % (ref 0.3–6.2)
EOSINOPHIL NFR BLD AUTO: 1.2 % (ref 0.3–6.2)
EOSINOPHIL NFR BLD AUTO: 1.7 % (ref 0.3–6.2)
EOSINOPHIL NFR BLD MANUAL: 1 % (ref 0.3–6.2)
EOSINOPHIL NFR BLD MANUAL: 2 % (ref 0.3–6.2)
EOSINOPHIL NFR BLD MANUAL: 3 % (ref 0.3–6.2)
ERYTHROCYTE [DISTWIDTH] IN BLOOD BY AUTOMATED COUNT: 17.9 % (ref 12.3–15.4)
ERYTHROCYTE [DISTWIDTH] IN BLOOD BY AUTOMATED COUNT: 18 % (ref 12.3–15.4)
ERYTHROCYTE [DISTWIDTH] IN BLOOD BY AUTOMATED COUNT: 19.6 % (ref 12.3–15.4)
ERYTHROCYTE [DISTWIDTH] IN BLOOD BY AUTOMATED COUNT: 20.7 % (ref 12.3–15.4)
ERYTHROCYTE [DISTWIDTH] IN BLOOD BY AUTOMATED COUNT: 21.6 % (ref 12.3–15.4)
ERYTHROCYTE [DISTWIDTH] IN BLOOD BY AUTOMATED COUNT: 21.7 % (ref 12.3–15.4)
ERYTHROCYTE [DISTWIDTH] IN BLOOD BY AUTOMATED COUNT: 22.1 % (ref 12.3–15.4)
ERYTHROCYTE [DISTWIDTH] IN BLOOD BY AUTOMATED COUNT: 22.3 % (ref 12.3–15.4)
ERYTHROCYTE [DISTWIDTH] IN BLOOD BY AUTOMATED COUNT: 22.5 % (ref 12.3–15.4)
ERYTHROCYTE [DISTWIDTH] IN BLOOD BY AUTOMATED COUNT: 22.8 % (ref 12.3–15.4)
ERYTHROCYTE [DISTWIDTH] IN BLOOD BY AUTOMATED COUNT: 23 % (ref 12.3–15.4)
ERYTHROCYTE [DISTWIDTH] IN BLOOD BY AUTOMATED COUNT: 23.3 % (ref 12.3–15.4)
ERYTHROCYTE [DISTWIDTH] IN BLOOD BY AUTOMATED COUNT: 24 % (ref 12.3–15.4)
ERYTHROCYTE [DISTWIDTH] IN BLOOD BY AUTOMATED COUNT: 24.6 % (ref 12.3–15.4)
ERYTHROCYTE [DISTWIDTH] IN BLOOD BY AUTOMATED COUNT: 25.9 % (ref 12.3–15.4)
ERYTHROCYTE [DISTWIDTH] IN BLOOD BY AUTOMATED COUNT: 26.9 % (ref 12.3–15.4)
ERYTHROCYTE [DISTWIDTH] IN BLOOD BY AUTOMATED COUNT: 27.8 % (ref 12.3–15.4)
ERYTHROCYTE [DISTWIDTH] IN BLOOD BY AUTOMATED COUNT: ABNORMAL %
FERRITIN SERPL-MCNC: 17.2 NG/ML (ref 30–400)
FERRITIN SERPL-MCNC: 543 NG/ML (ref 30–400)
FLUAV H1 2009 PAND RNA NPH QL NAA+PROBE: NOT DETECTED
FLUAV H1 HA GENE NPH QL NAA+PROBE: NOT DETECTED
FLUAV H3 RNA NPH QL NAA+PROBE: NOT DETECTED
FLUAV SUBTYP SPEC NAA+PROBE: NOT DETECTED
FLUBV RNA ISLT QL NAA+PROBE: NOT DETECTED
FOLATE SERPL-MCNC: 12.4 NG/ML (ref 4.78–24.2)
FOLATE SERPL-MCNC: 15.8 NG/ML (ref 4.78–24.2)
GFR SERPL CREATININE-BSD FRML MDRD: 107 ML/MIN/1.73
GFR SERPL CREATININE-BSD FRML MDRD: 111 ML/MIN/1.73
GFR SERPL CREATININE-BSD FRML MDRD: 113 ML/MIN/1.73
GFR SERPL CREATININE-BSD FRML MDRD: 121 ML/MIN/1.73
GFR SERPL CREATININE-BSD FRML MDRD: 121 ML/MIN/1.73
GFR SERPL CREATININE-BSD FRML MDRD: 123 ML/MIN/1.73
GFR SERPL CREATININE-BSD FRML MDRD: 125 ML/MIN/1.73
GFR SERPL CREATININE-BSD FRML MDRD: 125 ML/MIN/1.73
GFR SERPL CREATININE-BSD FRML MDRD: 128 ML/MIN/1.73
GFR SERPL CREATININE-BSD FRML MDRD: 135 ML/MIN/1.73
GFR SERPL CREATININE-BSD FRML MDRD: 138 ML/MIN/1.73
GFR SERPL CREATININE-BSD FRML MDRD: 141 ML/MIN/1.73
GFR SERPL CREATININE-BSD FRML MDRD: 143 ML/MIN/1.73
GFR SERPL CREATININE-BSD FRML MDRD: 146 ML/MIN/1.73
GFR SERPL CREATININE-BSD FRML MDRD: 146 ML/MIN/1.73
GFR SERPL CREATININE-BSD FRML MDRD: 150 ML/MIN/1.73
GFR SERPL CREATININE-BSD FRML MDRD: 67 ML/MIN/1.73
GFR SERPL CREATININE-BSD FRML MDRD: 76 ML/MIN/1.73
GFR SERPL CREATININE-BSD FRML MDRD: 92 ML/MIN/1.73
GFR SERPL CREATININE-BSD FRML MDRD: 92 ML/MIN/1.73
GFR SERPL CREATININE-BSD FRML MDRD: 95 ML/MIN/1.73
GFR SERPL CREATININE-BSD FRML MDRD: >150 ML/MIN/1.73
GLOBULIN UR ELPH-MCNC: 2.1 GM/DL
GLOBULIN UR ELPH-MCNC: 2.3 GM/DL
GLOBULIN UR ELPH-MCNC: 2.4 GM/DL
GLOBULIN UR ELPH-MCNC: 2.5 GM/DL
GLOBULIN UR ELPH-MCNC: 2.5 GM/DL
GLOBULIN UR ELPH-MCNC: 2.6 GM/DL
GLOBULIN UR ELPH-MCNC: 2.7 GM/DL
GLOBULIN UR ELPH-MCNC: 2.8 GM/DL
GLOBULIN UR ELPH-MCNC: 2.9 GM/DL
GLOBULIN UR ELPH-MCNC: 3 GM/DL
GLUCOSE BLD-MCNC: 101 MG/DL (ref 65–99)
GLUCOSE BLD-MCNC: 109 MG/DL (ref 65–99)
GLUCOSE BLD-MCNC: 109 MG/DL (ref 65–99)
GLUCOSE BLD-MCNC: 116 MG/DL (ref 65–99)
GLUCOSE BLD-MCNC: 121 MG/DL (ref 65–99)
GLUCOSE BLD-MCNC: 122 MG/DL (ref 65–99)
GLUCOSE BLD-MCNC: 122 MG/DL (ref 65–99)
GLUCOSE BLD-MCNC: 124 MG/DL (ref 65–99)
GLUCOSE BLD-MCNC: 130 MG/DL (ref 65–99)
GLUCOSE BLD-MCNC: 144 MG/DL (ref 65–99)
GLUCOSE BLD-MCNC: 146 MG/DL (ref 65–99)
GLUCOSE BLD-MCNC: 148 MG/DL (ref 65–99)
GLUCOSE BLD-MCNC: 176 MG/DL (ref 65–99)
GLUCOSE BLD-MCNC: 181 MG/DL (ref 65–99)
GLUCOSE BLD-MCNC: 191 MG/DL (ref 65–99)
GLUCOSE BLD-MCNC: 192 MG/DL (ref 65–99)
GLUCOSE BLD-MCNC: 196 MG/DL (ref 65–99)
GLUCOSE BLD-MCNC: 210 MG/DL (ref 65–99)
GLUCOSE BLD-MCNC: 217 MG/DL (ref 65–99)
GLUCOSE BLD-MCNC: 236 MG/DL (ref 65–99)
GLUCOSE BLD-MCNC: 236 MG/DL (ref 65–99)
GLUCOSE BLD-MCNC: 241 MG/DL (ref 65–99)
GLUCOSE BLD-MCNC: 244 MG/DL (ref 65–99)
GLUCOSE BLD-MCNC: 318 MG/DL (ref 65–99)
GLUCOSE BLD-MCNC: 459 MG/DL (ref 65–99)
GLUCOSE BLD-MCNC: 70 MG/DL (ref 65–99)
GLUCOSE BLD-MCNC: 72 MG/DL (ref 65–99)
GLUCOSE BLD-MCNC: 73 MG/DL (ref 65–99)
GLUCOSE BLD-MCNC: 82 MG/DL (ref 65–99)
GLUCOSE BLD-MCNC: 83 MG/DL (ref 65–99)
GLUCOSE BLD-MCNC: 83 MG/DL (ref 65–99)
GLUCOSE BLD-MCNC: 85 MG/DL (ref 65–99)
GLUCOSE BLD-MCNC: 86 MG/DL (ref 65–99)
GLUCOSE BLD-MCNC: 88 MG/DL (ref 65–99)
GLUCOSE BLD-MCNC: 89 MG/DL (ref 65–99)
GLUCOSE BLD-MCNC: 92 MG/DL (ref 65–99)
GLUCOSE BLD-MCNC: 92 MG/DL (ref 65–99)
GLUCOSE BLD-MCNC: 97 MG/DL (ref 65–99)
GLUCOSE BLDC GLUCOMTR-MCNC: 111 MG/DL (ref 70–130)
GLUCOSE BLDC GLUCOMTR-MCNC: 120 MG/DL (ref 70–130)
GLUCOSE BLDC GLUCOMTR-MCNC: 124 MG/DL (ref 70–130)
GLUCOSE BLDC GLUCOMTR-MCNC: 124 MG/DL (ref 70–130)
GLUCOSE BLDC GLUCOMTR-MCNC: 125 MG/DL (ref 70–130)
GLUCOSE BLDC GLUCOMTR-MCNC: 126 MG/DL (ref 70–130)
GLUCOSE BLDC GLUCOMTR-MCNC: 126 MG/DL (ref 70–130)
GLUCOSE BLDC GLUCOMTR-MCNC: 127 MG/DL (ref 70–130)
GLUCOSE BLDC GLUCOMTR-MCNC: 130 MG/DL (ref 70–130)
GLUCOSE BLDC GLUCOMTR-MCNC: 133 MG/DL (ref 70–130)
GLUCOSE BLDC GLUCOMTR-MCNC: 138 MG/DL (ref 70–130)
GLUCOSE BLDC GLUCOMTR-MCNC: 138 MG/DL (ref 70–130)
GLUCOSE BLDC GLUCOMTR-MCNC: 139 MG/DL (ref 70–130)
GLUCOSE BLDC GLUCOMTR-MCNC: 146 MG/DL (ref 70–130)
GLUCOSE BLDC GLUCOMTR-MCNC: 148 MG/DL (ref 70–130)
GLUCOSE BLDC GLUCOMTR-MCNC: 152 MG/DL (ref 70–130)
GLUCOSE BLDC GLUCOMTR-MCNC: 158 MG/DL (ref 70–130)
GLUCOSE BLDC GLUCOMTR-MCNC: 159 MG/DL (ref 70–130)
GLUCOSE BLDC GLUCOMTR-MCNC: 163 MG/DL (ref 70–130)
GLUCOSE BLDC GLUCOMTR-MCNC: 171 MG/DL (ref 70–130)
GLUCOSE BLDC GLUCOMTR-MCNC: 171 MG/DL (ref 70–130)
GLUCOSE BLDC GLUCOMTR-MCNC: 172 MG/DL (ref 70–130)
GLUCOSE BLDC GLUCOMTR-MCNC: 173 MG/DL (ref 70–130)
GLUCOSE BLDC GLUCOMTR-MCNC: 174 MG/DL (ref 70–130)
GLUCOSE BLDC GLUCOMTR-MCNC: 179 MG/DL (ref 70–130)
GLUCOSE BLDC GLUCOMTR-MCNC: 184 MG/DL (ref 70–130)
GLUCOSE BLDC GLUCOMTR-MCNC: 188 MG/DL (ref 70–130)
GLUCOSE BLDC GLUCOMTR-MCNC: 190 MG/DL (ref 70–130)
GLUCOSE BLDC GLUCOMTR-MCNC: 193 MG/DL (ref 70–130)
GLUCOSE BLDC GLUCOMTR-MCNC: 193 MG/DL (ref 70–130)
GLUCOSE BLDC GLUCOMTR-MCNC: 197 MG/DL (ref 70–130)
GLUCOSE BLDC GLUCOMTR-MCNC: 202 MG/DL (ref 70–130)
GLUCOSE BLDC GLUCOMTR-MCNC: 210 MG/DL (ref 70–130)
GLUCOSE BLDC GLUCOMTR-MCNC: 220 MG/DL (ref 70–130)
GLUCOSE BLDC GLUCOMTR-MCNC: 227 MG/DL (ref 70–130)
GLUCOSE BLDC GLUCOMTR-MCNC: 228 MG/DL (ref 70–130)
GLUCOSE BLDC GLUCOMTR-MCNC: 228 MG/DL (ref 70–130)
GLUCOSE BLDC GLUCOMTR-MCNC: 229 MG/DL (ref 70–130)
GLUCOSE BLDC GLUCOMTR-MCNC: 250 MG/DL (ref 70–130)
GLUCOSE BLDC GLUCOMTR-MCNC: 253 MG/DL (ref 70–130)
GLUCOSE BLDC GLUCOMTR-MCNC: 255 MG/DL (ref 70–130)
GLUCOSE BLDC GLUCOMTR-MCNC: 266 MG/DL (ref 70–130)
GLUCOSE BLDC GLUCOMTR-MCNC: 277 MG/DL (ref 70–130)
GLUCOSE BLDC GLUCOMTR-MCNC: 282 MG/DL (ref 70–130)
GLUCOSE BLDC GLUCOMTR-MCNC: 285 MG/DL (ref 70–130)
GLUCOSE BLDC GLUCOMTR-MCNC: 300 MG/DL (ref 70–130)
GLUCOSE BLDC GLUCOMTR-MCNC: 319 MG/DL (ref 70–130)
GLUCOSE BLDC GLUCOMTR-MCNC: 320 MG/DL (ref 70–130)
GLUCOSE UR STRIP-MCNC: NEGATIVE MG/DL
GRAM STN SPEC: ABNORMAL
HADV DNA SPEC NAA+PROBE: NOT DETECTED
HBA1C MFR BLD: 4.5 % (ref 4.8–5.6)
HCO3 BLDA-SCNC: 25.1 MMOL/L (ref 22–28)
HCO3 BLDA-SCNC: 25.5 MMOL/L (ref 22–28)
HCOV 229E RNA SPEC QL NAA+PROBE: NOT DETECTED
HCOV HKU1 RNA SPEC QL NAA+PROBE: NOT DETECTED
HCOV NL63 RNA SPEC QL NAA+PROBE: NOT DETECTED
HCOV OC43 RNA SPEC QL NAA+PROBE: NOT DETECTED
HCT VFR BLD AUTO: 23.2 % (ref 37.5–51)
HCT VFR BLD AUTO: 23.9 % (ref 37.5–51)
HCT VFR BLD AUTO: 24.9 % (ref 37.5–51)
HCT VFR BLD AUTO: 24.9 % (ref 37.5–51)
HCT VFR BLD AUTO: 25 % (ref 37.5–51)
HCT VFR BLD AUTO: 25 % (ref 37.5–51)
HCT VFR BLD AUTO: 25.9 % (ref 37.5–51)
HCT VFR BLD AUTO: 26 % (ref 37.5–51)
HCT VFR BLD AUTO: 26 % (ref 37.5–51)
HCT VFR BLD AUTO: 26.2 % (ref 37.5–51)
HCT VFR BLD AUTO: 26.3 % (ref 37.5–51)
HCT VFR BLD AUTO: 26.7 % (ref 37.5–51)
HCT VFR BLD AUTO: 27.2 % (ref 37.5–51)
HCT VFR BLD AUTO: 27.7 % (ref 37.5–51)
HCT VFR BLD AUTO: 28.3 % (ref 37.5–51)
HCT VFR BLD AUTO: 28.6 % (ref 37.5–51)
HCT VFR BLD AUTO: 29.1 % (ref 37.5–51)
HCT VFR BLD AUTO: 29.9 % (ref 37.5–51)
HCT VFR BLD AUTO: 30.2 % (ref 37.5–51)
HCT VFR BLD AUTO: 30.3 % (ref 37.5–51)
HCT VFR BLD AUTO: 30.5 % (ref 37.5–51)
HCT VFR BLD AUTO: 30.5 % (ref 37.5–51)
HCT VFR BLD AUTO: 31.6 % (ref 37.5–51)
HCT VFR BLD AUTO: 32 % (ref 37.5–51)
HCT VFR BLD AUTO: 32.7 % (ref 37.5–51)
HCT VFR BLD AUTO: 32.8 % (ref 37.5–51)
HCT VFR BLD AUTO: 33.3 % (ref 37.5–51)
HCT VFR BLD AUTO: 33.7 % (ref 37.5–51)
HCT VFR BLD AUTO: 34.3 % (ref 37.5–51)
HGB BLD-MCNC: 10.1 G/DL (ref 13–17.7)
HGB BLD-MCNC: 10.1 G/DL (ref 13–17.7)
HGB BLD-MCNC: 10.2 G/DL (ref 13–17.7)
HGB BLD-MCNC: 10.2 G/DL (ref 13–17.7)
HGB BLD-MCNC: 10.8 G/DL (ref 13–17.7)
HGB BLD-MCNC: 7 G/DL (ref 13–17.7)
HGB BLD-MCNC: 7.2 G/DL (ref 13–17.7)
HGB BLD-MCNC: 7.3 G/DL (ref 13–17.7)
HGB BLD-MCNC: 7.5 G/DL (ref 13–17.7)
HGB BLD-MCNC: 7.8 G/DL (ref 13–17.7)
HGB BLD-MCNC: 7.9 G/DL (ref 13–17.7)
HGB BLD-MCNC: 8.1 G/DL (ref 13–17.7)
HGB BLD-MCNC: 8.1 G/DL (ref 13–17.7)
HGB BLD-MCNC: 8.3 G/DL (ref 13–17.7)
HGB BLD-MCNC: 8.3 G/DL (ref 13–17.7)
HGB BLD-MCNC: 8.5 G/DL (ref 13–17.7)
HGB BLD-MCNC: 8.6 G/DL (ref 13–17.7)
HGB BLD-MCNC: 8.7 G/DL (ref 13–17.7)
HGB BLD-MCNC: 8.9 G/DL (ref 13–17.7)
HGB BLD-MCNC: 9 G/DL (ref 13–17.7)
HGB BLD-MCNC: 9.4 G/DL (ref 13–17.7)
HGB BLD-MCNC: 9.4 G/DL (ref 13–17.7)
HGB BLD-MCNC: 9.5 G/DL (ref 13–17.7)
HGB BLD-MCNC: 9.6 G/DL (ref 13–17.7)
HGB BLD-MCNC: 9.7 G/DL (ref 13–17.7)
HGB BLD-MCNC: 9.8 G/DL (ref 13–17.7)
HGB BLD-MCNC: 9.8 G/DL (ref 13–17.7)
HGB BLD-MCNC: 9.9 G/DL (ref 13–17.7)
HGB UR QL STRIP.AUTO: ABNORMAL
HGB UR QL STRIP.AUTO: ABNORMAL
HGB UR QL STRIP.AUTO: NEGATIVE
HMPV RNA NPH QL NAA+NON-PROBE: NOT DETECTED
HOLD SPECIMEN: NORMAL
HOLD SPECIMEN: NORMAL
HOROWITZ INDEX BLD+IHG-RTO: 50 %
HOROWITZ INDEX BLD+IHG-RTO: 70 %
HPIV1 RNA SPEC QL NAA+PROBE: NOT DETECTED
HPIV2 RNA SPEC QL NAA+PROBE: NOT DETECTED
HPIV3 RNA NPH QL NAA+PROBE: NOT DETECTED
HPIV4 P GENE NPH QL NAA+PROBE: NOT DETECTED
HYALINE CASTS UR QL AUTO: ABNORMAL /LPF
HYALINE CASTS UR QL AUTO: ABNORMAL /LPF
HYALINE CASTS UR QL AUTO: NORMAL /LPF
HYPOCHROMIA BLD QL: ABNORMAL
HYPOCHROMIA BLD QL: NORMAL
IMM GRANULOCYTES # BLD AUTO: 0.01 10*3/MM3 (ref 0–0.05)
IMM GRANULOCYTES NFR BLD AUTO: 0.1 % (ref 0–0.5)
INR PPP: 1.48 (ref 0.9–1.1)
IRON 24H UR-MRATE: 10 MCG/DL (ref 59–158)
IRON 24H UR-MRATE: 18 MCG/DL (ref 59–158)
IRON SATN MFR SERPL: 5 % (ref 20–50)
IRON SATN MFR SERPL: 5 % (ref 20–50)
KETONES UR QL STRIP: ABNORMAL
KETONES UR QL STRIP: NEGATIVE
LAB AP CASE REPORT: NORMAL
LEUKOCYTE ESTERASE UR QL STRIP.AUTO: ABNORMAL
LEUKOCYTE ESTERASE UR QL STRIP.AUTO: NEGATIVE
LIPASE SERPL-CCNC: 42 U/L (ref 13–60)
LIPASE SERPL-CCNC: 43 U/L (ref 13–60)
LIPASE SERPL-CCNC: 65 U/L (ref 13–60)
LYMPHOCYTES # BLD AUTO: 0.85 10*3/MM3 (ref 0.7–3.1)
LYMPHOCYTES # BLD AUTO: 0.86 10*3/MM3 (ref 0.7–3.1)
LYMPHOCYTES # BLD AUTO: 0.89 10*3/MM3 (ref 0.7–3.1)
LYMPHOCYTES # BLD AUTO: 0.89 10*3/MM3 (ref 0.7–3.1)
LYMPHOCYTES # BLD AUTO: 1.02 10*3/MM3 (ref 0.7–3.1)
LYMPHOCYTES # BLD MANUAL: 0.61 10*3/MM3 (ref 0.7–3.1)
LYMPHOCYTES # BLD MANUAL: 0.7 10*3/MM3 (ref 0.7–3.1)
LYMPHOCYTES # BLD MANUAL: 0.7 10*3/MM3 (ref 0.7–3.1)
LYMPHOCYTES # BLD MANUAL: 0.85 10*3/MM3 (ref 0.7–3.1)
LYMPHOCYTES # BLD MANUAL: 0.86 10*3/MM3 (ref 0.7–3.1)
LYMPHOCYTES # BLD MANUAL: 0.9 10*3/MM3 (ref 0.7–3.1)
LYMPHOCYTES # BLD MANUAL: 0.94 10*3/MM3 (ref 0.7–3.1)
LYMPHOCYTES # BLD MANUAL: 1.23 10*3/MM3 (ref 0.7–3.1)
LYMPHOCYTES # BLD MANUAL: 1.96 10*3/MM3 (ref 0.7–3.1)
LYMPHOCYTES # BLD MANUAL: 2.2 10*3/MM3 (ref 0.7–3.1)
LYMPHOCYTES NFR BLD AUTO: 13.2 % (ref 19.6–45.3)
LYMPHOCYTES NFR BLD AUTO: 6.1 % (ref 19.6–45.3)
LYMPHOCYTES NFR BLD AUTO: 6.8 % (ref 19.6–45.3)
LYMPHOCYTES NFR BLD AUTO: 8.8 % (ref 19.6–45.3)
LYMPHOCYTES NFR BLD AUTO: 9.3 % (ref 19.6–45.3)
LYMPHOCYTES NFR BLD MANUAL: 1 % (ref 5–12)
LYMPHOCYTES NFR BLD MANUAL: 1 % (ref 5–12)
LYMPHOCYTES NFR BLD MANUAL: 10.1 % (ref 19.6–45.3)
LYMPHOCYTES NFR BLD MANUAL: 11.2 % (ref 19.6–45.3)
LYMPHOCYTES NFR BLD MANUAL: 13.4 % (ref 19.6–45.3)
LYMPHOCYTES NFR BLD MANUAL: 14 % (ref 19.6–45.3)
LYMPHOCYTES NFR BLD MANUAL: 14 % (ref 19.6–45.3)
LYMPHOCYTES NFR BLD MANUAL: 2 % (ref 5–12)
LYMPHOCYTES NFR BLD MANUAL: 25 % (ref 19.6–45.3)
LYMPHOCYTES NFR BLD MANUAL: 28 % (ref 19.6–45.3)
LYMPHOCYTES NFR BLD MANUAL: 3 % (ref 5–12)
LYMPHOCYTES NFR BLD MANUAL: 3.1 % (ref 5–12)
LYMPHOCYTES NFR BLD MANUAL: 4 % (ref 5–12)
LYMPHOCYTES NFR BLD MANUAL: 5 % (ref 5–12)
LYMPHOCYTES NFR BLD MANUAL: 5.1 % (ref 5–12)
LYMPHOCYTES NFR BLD MANUAL: 6 % (ref 5–12)
LYMPHOCYTES NFR BLD MANUAL: 6.1 % (ref 5–12)
LYMPHOCYTES NFR BLD MANUAL: 8 % (ref 19.6–45.3)
LYMPHOCYTES NFR BLD MANUAL: 8.1 % (ref 19.6–45.3)
LYMPHOCYTES NFR BLD MANUAL: 9.1 % (ref 19.6–45.3)
M PNEUMO IGG SER IA-ACNC: NOT DETECTED
MAGNESIUM SERPL-MCNC: 1.9 MG/DL (ref 1.6–2.4)
MAGNESIUM SERPL-MCNC: 1.9 MG/DL (ref 1.6–2.4)
MAGNESIUM SERPL-MCNC: 2 MG/DL (ref 1.6–2.4)
MAGNESIUM SERPL-MCNC: 2 MG/DL (ref 1.6–2.4)
MAGNESIUM SERPL-MCNC: 2.1 MG/DL (ref 1.6–2.4)
MAGNESIUM SERPL-MCNC: 2.1 MG/DL (ref 1.6–2.4)
MAGNESIUM SERPL-MCNC: 2.2 MG/DL (ref 1.6–2.4)
MAGNESIUM SERPL-MCNC: 2.4 MG/DL (ref 1.6–2.4)
MAGNESIUM SERPL-MCNC: 2.7 MG/DL (ref 1.6–2.4)
MCH RBC QN AUTO: 17.7 PG (ref 26.6–33)
MCH RBC QN AUTO: 18.1 PG (ref 26.6–33)
MCH RBC QN AUTO: 19.2 PG (ref 26.6–33)
MCH RBC QN AUTO: 21.3 PG (ref 26.6–33)
MCH RBC QN AUTO: 22.2 PG (ref 26.6–33)
MCH RBC QN AUTO: 23 PG (ref 26.6–33)
MCH RBC QN AUTO: 23 PG (ref 26.6–33)
MCH RBC QN AUTO: 24.6 PG (ref 26.6–33)
MCH RBC QN AUTO: 24.7 PG (ref 26.6–33)
MCH RBC QN AUTO: 25.1 PG (ref 26.6–33)
MCH RBC QN AUTO: 25.4 PG (ref 26.6–33)
MCH RBC QN AUTO: 25.8 PG (ref 26.6–33)
MCH RBC QN AUTO: 25.9 PG (ref 26.6–33)
MCH RBC QN AUTO: 26 PG (ref 26.6–33)
MCH RBC QN AUTO: 26.1 PG (ref 26.6–33)
MCH RBC QN AUTO: 26.2 PG (ref 26.6–33)
MCH RBC QN AUTO: 26.2 PG (ref 26.6–33)
MCH RBC QN AUTO: 26.3 PG (ref 26.6–33)
MCH RBC QN AUTO: 26.4 PG (ref 26.6–33)
MCH RBC QN AUTO: 26.5 PG (ref 26.6–33)
MCH RBC QN AUTO: 26.5 PG (ref 26.6–33)
MCH RBC QN AUTO: 26.6 PG (ref 26.6–33)
MCH RBC QN AUTO: 26.7 PG (ref 26.6–33)
MCH RBC QN AUTO: 26.8 PG (ref 26.6–33)
MCH RBC QN AUTO: 27.2 PG (ref 26.6–33)
MCHC RBC AUTO-ENTMCNC: 27.3 G/DL (ref 31.5–35.7)
MCHC RBC AUTO-ENTMCNC: 28.2 G/DL (ref 31.5–35.7)
MCHC RBC AUTO-ENTMCNC: 28.2 G/DL (ref 31.5–35.7)
MCHC RBC AUTO-ENTMCNC: 30 G/DL (ref 31.5–35.7)
MCHC RBC AUTO-ENTMCNC: 30.3 G/DL (ref 31.5–35.7)
MCHC RBC AUTO-ENTMCNC: 30.6 G/DL (ref 31.5–35.7)
MCHC RBC AUTO-ENTMCNC: 30.8 G/DL (ref 31.5–35.7)
MCHC RBC AUTO-ENTMCNC: 30.9 G/DL (ref 31.5–35.7)
MCHC RBC AUTO-ENTMCNC: 31 G/DL (ref 31.5–35.7)
MCHC RBC AUTO-ENTMCNC: 31.1 G/DL (ref 31.5–35.7)
MCHC RBC AUTO-ENTMCNC: 31.1 G/DL (ref 31.5–35.7)
MCHC RBC AUTO-ENTMCNC: 31.4 G/DL (ref 31.5–35.7)
MCHC RBC AUTO-ENTMCNC: 31.5 G/DL (ref 31.5–35.7)
MCHC RBC AUTO-ENTMCNC: 31.7 G/DL (ref 31.5–35.7)
MCHC RBC AUTO-ENTMCNC: 31.8 G/DL (ref 31.5–35.7)
MCHC RBC AUTO-ENTMCNC: 31.9 G/DL (ref 31.5–35.7)
MCHC RBC AUTO-ENTMCNC: 32.1 G/DL (ref 31.5–35.7)
MCHC RBC AUTO-ENTMCNC: 32.1 G/DL (ref 31.5–35.7)
MCHC RBC AUTO-ENTMCNC: 32.3 G/DL (ref 31.5–35.7)
MCHC RBC AUTO-ENTMCNC: 32.3 G/DL (ref 31.5–35.7)
MCHC RBC AUTO-ENTMCNC: 32.4 G/DL (ref 31.5–35.7)
MCHC RBC AUTO-ENTMCNC: 32.7 G/DL (ref 31.5–35.7)
MCHC RBC AUTO-ENTMCNC: 33 G/DL (ref 31.5–35.7)
MCHC RBC AUTO-ENTMCNC: 33.1 G/DL (ref 31.5–35.7)
MCHC RBC AUTO-ENTMCNC: 33.2 G/DL (ref 31.5–35.7)
MCHC RBC AUTO-ENTMCNC: 33.3 G/DL (ref 31.5–35.7)
MCHC RBC AUTO-ENTMCNC: 33.5 G/DL (ref 31.5–35.7)
MCV RBC AUTO: 64.3 FL (ref 79–97)
MCV RBC AUTO: 64.6 FL (ref 79–97)
MCV RBC AUTO: 68.1 FL (ref 79–97)
MCV RBC AUTO: 69.5 FL (ref 79–97)
MCV RBC AUTO: 70.5 FL (ref 79–97)
MCV RBC AUTO: 71.1 FL (ref 79–97)
MCV RBC AUTO: 72.1 FL (ref 79–97)
MCV RBC AUTO: 72.2 FL (ref 79–97)
MCV RBC AUTO: 74 FL (ref 79–97)
MCV RBC AUTO: 75.9 FL (ref 79–97)
MCV RBC AUTO: 76.9 FL (ref 79–97)
MCV RBC AUTO: 77.5 FL (ref 79–97)
MCV RBC AUTO: 79.1 FL (ref 79–97)
MCV RBC AUTO: 79.3 FL (ref 79–97)
MCV RBC AUTO: 79.8 FL (ref 79–97)
MCV RBC AUTO: 79.9 FL (ref 79–97)
MCV RBC AUTO: 80 FL (ref 79–97)
MCV RBC AUTO: 80.3 FL (ref 79–97)
MCV RBC AUTO: 82 FL (ref 79–97)
MCV RBC AUTO: 82.4 FL (ref 79–97)
MCV RBC AUTO: 82.7 FL (ref 79–97)
MCV RBC AUTO: 82.8 FL (ref 79–97)
MCV RBC AUTO: 83.1 FL (ref 79–97)
MCV RBC AUTO: 83.4 FL (ref 79–97)
MCV RBC AUTO: 83.4 FL (ref 79–97)
MCV RBC AUTO: 83.7 FL (ref 79–97)
MCV RBC AUTO: 84.5 FL (ref 79–97)
METAMYELOCYTES NFR BLD MANUAL: 2 % (ref 0–0)
MICROCYTES BLD QL: ABNORMAL
MODALITY: ABNORMAL
MODALITY: ABNORMAL
MONOCYTES # BLD AUTO: 0.07 10*3/MM3 (ref 0.1–0.9)
MONOCYTES # BLD AUTO: 0.08 10*3/MM3 (ref 0.1–0.9)
MONOCYTES # BLD AUTO: 0.17 10*3/MM3 (ref 0.1–0.9)
MONOCYTES # BLD AUTO: 0.2 10*3/MM3 (ref 0.1–0.9)
MONOCYTES # BLD AUTO: 0.24 10*3/MM3 (ref 0.1–0.9)
MONOCYTES # BLD AUTO: 0.31 10*3/MM3 (ref 0.1–0.9)
MONOCYTES # BLD AUTO: 0.34 10*3/MM3 (ref 0.1–0.9)
MONOCYTES # BLD AUTO: 0.44 10*3/MM3 (ref 0.1–0.9)
MONOCYTES # BLD AUTO: 0.51 10*3/MM3 (ref 0.1–0.9)
MONOCYTES # BLD AUTO: 0.52 10*3/MM3 (ref 0.1–0.9)
MONOCYTES # BLD AUTO: 0.53 10*3/MM3 (ref 0.1–0.9)
MONOCYTES # BLD AUTO: 0.54 10*3/MM3 (ref 0.1–0.9)
MONOCYTES # BLD AUTO: 0.78 10*3/MM3 (ref 0.1–0.9)
MONOCYTES # BLD AUTO: 0.93 10*3/MM3 (ref 0.1–0.9)
MONOCYTES # BLD AUTO: 1.06 10*3/MM3 (ref 0.1–0.9)
MONOCYTES NFR BLD AUTO: 5.9 % (ref 5–12)
MONOCYTES NFR BLD AUTO: 6.4 % (ref 5–12)
MONOCYTES NFR BLD AUTO: 6.8 % (ref 5–12)
MONOCYTES NFR BLD AUTO: 7.6 % (ref 5–12)
MONOCYTES NFR BLD AUTO: 8.5 % (ref 5–12)
MYELOCYTES NFR BLD MANUAL: 1 % (ref 0–0)
NEUTROPHILS # BLD AUTO: 10.32 10*3/MM3 (ref 1.7–7)
NEUTROPHILS # BLD AUTO: 12.42 10*3/MM3 (ref 1.7–7)
NEUTROPHILS # BLD AUTO: 5.1 10*3/MM3 (ref 1.7–7)
NEUTROPHILS # BLD AUTO: 5.21 10*3/MM3 (ref 1.7–7)
NEUTROPHILS # BLD AUTO: 5.31 10*3/MM3 (ref 1.7–7)
NEUTROPHILS # BLD AUTO: 5.49 10*3/MM3 (ref 1.7–7)
NEUTROPHILS # BLD AUTO: 5.54 10*3/MM3 (ref 1.7–7)
NEUTROPHILS # BLD AUTO: 5.61 10*3/MM3 (ref 1.7–7)
NEUTROPHILS # BLD AUTO: 6.42 10*3/MM3 (ref 1.7–7)
NEUTROPHILS # BLD AUTO: 6.94 10*3/MM3 (ref 1.7–7)
NEUTROPHILS # BLD AUTO: 7 10*3/MM3 (ref 1.7–7)
NEUTROPHILS # BLD AUTO: 7.23 10*3/MM3 (ref 1.7–7)
NEUTROPHILS # BLD AUTO: 7.64 10*3/MM3 (ref 1.7–7)
NEUTROPHILS # BLD AUTO: 7.84 10*3/MM3 (ref 1.7–7)
NEUTROPHILS # BLD AUTO: 9.42 10*3/MM3 (ref 1.7–7)
NEUTROPHILS NFR BLD AUTO: 77.3 % (ref 42.7–76)
NEUTROPHILS NFR BLD AUTO: 81.8 % (ref 42.7–76)
NEUTROPHILS NFR BLD AUTO: 82.9 % (ref 42.7–76)
NEUTROPHILS NFR BLD AUTO: 83.2 % (ref 42.7–76)
NEUTROPHILS NFR BLD AUTO: 85.8 % (ref 42.7–76)
NEUTROPHILS NFR BLD MANUAL: 65 % (ref 42.7–76)
NEUTROPHILS NFR BLD MANUAL: 70 % (ref 42.7–76)
NEUTROPHILS NFR BLD MANUAL: 79 % (ref 42.7–76)
NEUTROPHILS NFR BLD MANUAL: 79 % (ref 42.7–76)
NEUTROPHILS NFR BLD MANUAL: 82.8 % (ref 42.7–76)
NEUTROPHILS NFR BLD MANUAL: 82.8 % (ref 42.7–76)
NEUTROPHILS NFR BLD MANUAL: 83.5 % (ref 42.7–76)
NEUTROPHILS NFR BLD MANUAL: 83.7 % (ref 42.7–76)
NEUTROPHILS NFR BLD MANUAL: 83.8 % (ref 42.7–76)
NEUTROPHILS NFR BLD MANUAL: 90 % (ref 42.7–76)
NITRITE UR QL STRIP: NEGATIVE
NRBC BLD AUTO-RTO: 0 /100 WBC (ref 0–0.2)
NT-PROBNP SERPL-MCNC: 4363 PG/ML (ref 5–900)
O2 A-A PPRESDIFF RESPIRATORY: 0.1 MMHG
O2 A-A PPRESDIFF RESPIRATORY: 0.2 MMHG
OSMOLALITY UR: 406 MOSM/KG
OVALOCYTES BLD QL SMEAR: ABNORMAL
PATH REPORT.FINAL DX SPEC: NORMAL
PATH REPORT.GROSS SPEC: NORMAL
PCO2 BLDA: 32.9 MM HG (ref 35–45)
PCO2 BLDA: 35.7 MM HG (ref 35–45)
PH BLDA: 7.46 PH UNITS (ref 7.35–7.45)
PH BLDA: 7.49 PH UNITS (ref 7.35–7.45)
PH UR STRIP.AUTO: 6 [PH] (ref 5–8)
PH UR STRIP.AUTO: 6.5 [PH] (ref 5–8)
PH UR STRIP.AUTO: 8 [PH] (ref 5–8)
PH UR STRIP.AUTO: 8 [PH] (ref 5–8)
PH UR STRIP.AUTO: 8.5 [PH] (ref 5–8)
PH UR STRIP.AUTO: <=5 [PH] (ref 5–8)
PH UR STRIP.AUTO: <=5 [PH] (ref 5–8)
PHOSPHATE SERPL-MCNC: 1.2 MG/DL (ref 2.5–4.5)
PHOSPHATE SERPL-MCNC: 2 MG/DL (ref 2.5–4.5)
PHOSPHATE SERPL-MCNC: 2.5 MG/DL (ref 2.5–4.5)
PHOSPHATE SERPL-MCNC: 2.8 MG/DL (ref 2.5–4.5)
PHOSPHATE SERPL-MCNC: 3.1 MG/DL (ref 2.5–4.5)
PHOSPHATE SERPL-MCNC: 4.4 MG/DL (ref 2.5–4.5)
PLAT MORPH BLD: NORMAL
PLATELET # BLD AUTO: 140 10*3/MM3 (ref 140–450)
PLATELET # BLD AUTO: 157 10*3/MM3 (ref 140–450)
PLATELET # BLD AUTO: 162 10*3/MM3 (ref 140–450)
PLATELET # BLD AUTO: 193 10*3/MM3 (ref 140–450)
PLATELET # BLD AUTO: 198 10*3/MM3 (ref 140–450)
PLATELET # BLD AUTO: 204 10*3/MM3 (ref 140–450)
PLATELET # BLD AUTO: 211 10*3/MM3 (ref 140–450)
PLATELET # BLD AUTO: 222 10*3/MM3 (ref 140–450)
PLATELET # BLD AUTO: 224 10*3/MM3 (ref 140–450)
PLATELET # BLD AUTO: 232 10*3/MM3 (ref 140–450)
PLATELET # BLD AUTO: 238 10*3/MM3 (ref 140–450)
PLATELET # BLD AUTO: 238 10*3/MM3 (ref 140–450)
PLATELET # BLD AUTO: 241 10*3/MM3 (ref 140–450)
PLATELET # BLD AUTO: 267 10*3/MM3 (ref 140–450)
PLATELET # BLD AUTO: 275 10*3/MM3 (ref 140–450)
PLATELET # BLD AUTO: 282 10*3/MM3 (ref 140–450)
PLATELET # BLD AUTO: 293 10*3/MM3 (ref 140–450)
PLATELET # BLD AUTO: 312 10*3/MM3 (ref 140–450)
PLATELET # BLD AUTO: 319 10*3/MM3 (ref 140–450)
PLATELET # BLD AUTO: 319 10*3/MM3 (ref 140–450)
PLATELET # BLD AUTO: 324 10*3/MM3 (ref 140–450)
PLATELET # BLD AUTO: 329 10*3/MM3 (ref 140–450)
PLATELET # BLD AUTO: 367 10*3/MM3 (ref 140–450)
PLATELET # BLD AUTO: 435 10*3/MM3 (ref 140–450)
PLATELET # BLD AUTO: 468 10*3/MM3 (ref 140–450)
PLATELET # BLD AUTO: 499 10*3/MM3 (ref 140–450)
PLATELET # BLD AUTO: 569 10*3/MM3 (ref 140–450)
PMV BLD AUTO: 10 FL (ref 6–12)
PMV BLD AUTO: 10.3 FL (ref 6–12)
PMV BLD AUTO: 10.3 FL (ref 6–12)
PMV BLD AUTO: 10.4 FL (ref 6–12)
PMV BLD AUTO: 10.4 FL (ref 6–12)
PMV BLD AUTO: 10.5 FL (ref 6–12)
PMV BLD AUTO: 10.5 FL (ref 6–12)
PMV BLD AUTO: 10.6 FL (ref 6–12)
PMV BLD AUTO: 10.6 FL (ref 6–12)
PMV BLD AUTO: 10.7 FL (ref 6–12)
PMV BLD AUTO: 10.8 FL (ref 6–12)
PMV BLD AUTO: 11 FL (ref 6–12)
PMV BLD AUTO: 11.1 FL (ref 6–12)
PMV BLD AUTO: 11.1 FL (ref 6–12)
PMV BLD AUTO: 11.2 FL (ref 6–12)
PMV BLD AUTO: 11.3 FL (ref 6–12)
PMV BLD AUTO: 11.4 FL (ref 6–12)
PMV BLD AUTO: 11.5 FL (ref 6–12)
PO2 BLDA: 53.1 MM HG (ref 80–100)
PO2 BLDA: 69.5 MM HG (ref 80–100)
POIKILOCYTOSIS BLD QL SMEAR: ABNORMAL
POLYCHROMASIA BLD QL SMEAR: ABNORMAL
POTASSIUM BLD-SCNC: 3.3 MMOL/L (ref 3.5–5.2)
POTASSIUM BLD-SCNC: 3.4 MMOL/L (ref 3.5–5.2)
POTASSIUM BLD-SCNC: 3.5 MMOL/L (ref 3.5–5.2)
POTASSIUM BLD-SCNC: 3.5 MMOL/L (ref 3.5–5.2)
POTASSIUM BLD-SCNC: 3.6 MMOL/L (ref 3.5–5.2)
POTASSIUM BLD-SCNC: 3.7 MMOL/L (ref 3.5–5.2)
POTASSIUM BLD-SCNC: 3.8 MMOL/L (ref 3.5–5.2)
POTASSIUM BLD-SCNC: 3.9 MMOL/L (ref 3.5–5.2)
POTASSIUM BLD-SCNC: 4 MMOL/L (ref 3.5–5.2)
POTASSIUM BLD-SCNC: 4.1 MMOL/L (ref 3.5–5.2)
POTASSIUM BLD-SCNC: 4.2 MMOL/L (ref 3.5–5.2)
POTASSIUM BLD-SCNC: 4.3 MMOL/L (ref 3.5–5.2)
POTASSIUM BLD-SCNC: 4.4 MMOL/L (ref 3.5–5.2)
POTASSIUM BLD-SCNC: 4.5 MMOL/L (ref 3.5–5.2)
POTASSIUM BLD-SCNC: 4.6 MMOL/L (ref 3.5–5.2)
POTASSIUM BLD-SCNC: 4.6 MMOL/L (ref 3.5–5.2)
PROCALCITONIN SERPL-MCNC: 0.08 NG/ML (ref 0.1–0.25)
PROCALCITONIN SERPL-MCNC: 0.14 NG/ML (ref 0.1–0.25)
PROCALCITONIN SERPL-MCNC: 0.59 NG/ML (ref 0.1–0.25)
PROT SERPL-MCNC: 5 G/DL (ref 6–8.5)
PROT SERPL-MCNC: 5.4 G/DL (ref 6–8.5)
PROT SERPL-MCNC: 6.1 G/DL (ref 6–8.5)
PROT SERPL-MCNC: 6.3 G/DL (ref 6–8.5)
PROT SERPL-MCNC: 6.3 G/DL (ref 6–8.5)
PROT SERPL-MCNC: 6.4 G/DL (ref 6–8.5)
PROT SERPL-MCNC: 6.5 G/DL (ref 6–8.5)
PROT SERPL-MCNC: 6.6 G/DL (ref 6–8.5)
PROT SERPL-MCNC: 6.7 G/DL (ref 6–8.5)
PROT SERPL-MCNC: 6.9 G/DL (ref 6–8.5)
PROT UR QL STRIP: ABNORMAL
PROT UR QL STRIP: ABNORMAL
PROT UR QL STRIP: NEGATIVE
PROTHROMBIN TIME: 17.6 SECONDS (ref 11.7–14.2)
RBC # BLD AUTO: 2.9 10*6/MM3 (ref 4.14–5.8)
RBC # BLD AUTO: 3.05 10*6/MM3 (ref 4.14–5.8)
RBC # BLD AUTO: 3.1 10*6/MM3 (ref 4.14–5.8)
RBC # BLD AUTO: 3.14 10*6/MM3 (ref 4.14–5.8)
RBC # BLD AUTO: 3.18 10*6/MM3 (ref 4.14–5.8)
RBC # BLD AUTO: 3.26 10*6/MM3 (ref 4.14–5.8)
RBC # BLD AUTO: 3.28 10*6/MM3 (ref 4.14–5.8)
RBC # BLD AUTO: 3.38 10*6/MM3 (ref 4.14–5.8)
RBC # BLD AUTO: 3.51 10*6/MM3 (ref 4.14–5.8)
RBC # BLD AUTO: 3.61 10*6/MM3 (ref 4.14–5.8)
RBC # BLD AUTO: 3.62 10*6/MM3 (ref 4.14–5.8)
RBC # BLD AUTO: 3.66 10*6/MM3 (ref 4.14–5.8)
RBC # BLD AUTO: 3.68 10*6/MM3 (ref 4.14–5.8)
RBC # BLD AUTO: 3.7 10*6/MM3 (ref 4.14–5.8)
RBC # BLD AUTO: 3.74 10*6/MM3 (ref 4.14–5.8)
RBC # BLD AUTO: 3.89 10*6/MM3 (ref 4.14–5.8)
RBC # BLD AUTO: 3.94 10*6/MM3 (ref 4.14–5.8)
RBC # BLD AUTO: 4.03 10*6/MM3 (ref 4.14–5.8)
RBC # BLD AUTO: 4.07 10*6/MM3 (ref 4.14–5.8)
RBC # BLD AUTO: 4.13 10*6/MM3 (ref 4.14–5.8)
RBC # BLD AUTO: 4.13 10*6/MM3 (ref 4.14–5.8)
RBC # BLD AUTO: 4.27 10*6/MM3 (ref 4.14–5.8)
RBC # BLD AUTO: 4.44 10*6/MM3 (ref 4.14–5.8)
RBC # BLD AUTO: 4.54 10*6/MM3 (ref 4.14–5.8)
RBC # BLD AUTO: 4.64 10*6/MM3 (ref 4.14–5.8)
RBC # BLD AUTO: 4.74 10*6/MM3 (ref 4.14–5.8)
RBC # BLD AUTO: 4.79 10*6/MM3 (ref 4.14–5.8)
RBC # UR: ABNORMAL /HPF
RBC # UR: ABNORMAL /HPF
RBC # UR: NORMAL /HPF
REF LAB TEST METHOD: ABNORMAL
REF LAB TEST METHOD: ABNORMAL
REF LAB TEST METHOD: NORMAL
RETICS # AUTO: 0.03 10*6/MM3 (ref 0.02–0.13)
RETICS/RBC NFR AUTO: 0.69 % (ref 0.7–1.9)
RH BLD: NEGATIVE
RHINOVIRUS RNA SPEC NAA+PROBE: NOT DETECTED
RSV RNA NPH QL NAA+NON-PROBE: NOT DETECTED
SAO2 % BLDCOA: 90 % (ref 92–99)
SAO2 % BLDCOA: 94.8 % (ref 92–99)
SARS-COV-2 RNA RESP QL NAA+PROBE: NOT DETECTED
SCHISTOCYTES BLD QL SMEAR: ABNORMAL
SET MECH RESP RATE: 18
SMUDGE CELLS BLD QL SMEAR: ABNORMAL
SODIUM BLD-SCNC: 123 MMOL/L (ref 136–145)
SODIUM BLD-SCNC: 128 MMOL/L (ref 136–145)
SODIUM BLD-SCNC: 132 MMOL/L (ref 136–145)
SODIUM BLD-SCNC: 132 MMOL/L (ref 136–145)
SODIUM BLD-SCNC: 133 MMOL/L (ref 136–145)
SODIUM BLD-SCNC: 133 MMOL/L (ref 136–145)
SODIUM BLD-SCNC: 134 MMOL/L (ref 136–145)
SODIUM BLD-SCNC: 135 MMOL/L (ref 136–145)
SODIUM BLD-SCNC: 135 MMOL/L (ref 136–145)
SODIUM BLD-SCNC: 136 MMOL/L (ref 136–145)
SODIUM BLD-SCNC: 137 MMOL/L (ref 136–145)
SODIUM BLD-SCNC: 138 MMOL/L (ref 136–145)
SODIUM BLD-SCNC: 139 MMOL/L (ref 136–145)
SODIUM BLD-SCNC: 140 MMOL/L (ref 136–145)
SODIUM BLD-SCNC: 141 MMOL/L (ref 136–145)
SODIUM BLD-SCNC: 141 MMOL/L (ref 136–145)
SODIUM BLD-SCNC: 142 MMOL/L (ref 136–145)
SODIUM BLD-SCNC: 143 MMOL/L (ref 136–145)
SODIUM BLD-SCNC: 143 MMOL/L (ref 136–145)
SODIUM BLD-SCNC: 144 MMOL/L (ref 136–145)
SODIUM BLD-SCNC: 144 MMOL/L (ref 136–145)
SODIUM BLD-SCNC: 145 MMOL/L (ref 136–145)
SODIUM BLD-SCNC: 148 MMOL/L (ref 136–145)
SODIUM BLD-SCNC: 150 MMOL/L (ref 136–145)
SODIUM BLD-SCNC: 150 MMOL/L (ref 136–145)
SODIUM BLD-SCNC: 151 MMOL/L (ref 136–145)
SODIUM BLD-SCNC: 155 MMOL/L (ref 136–145)
SODIUM BLD-SCNC: 157 MMOL/L (ref 136–145)
SODIUM BLD-SCNC: 160 MMOL/L (ref 136–145)
SODIUM BLD-SCNC: 160 MMOL/L (ref 136–145)
SODIUM BLD-SCNC: 164 MMOL/L (ref 136–145)
SODIUM BLD-SCNC: 165 MMOL/L (ref 136–145)
SODIUM UR-SCNC: 36 MMOL/L
SP GR UR STRIP: 1.02 (ref 1–1.03)
SP GR UR STRIP: 1.03 (ref 1–1.03)
SPHEROCYTES BLD QL SMEAR: NORMAL
SQUAMOUS #/AREA URNS HPF: ABNORMAL /HPF
SQUAMOUS #/AREA URNS HPF: ABNORMAL /HPF
SQUAMOUS #/AREA URNS HPF: NORMAL /HPF
T&S EXPIRATION DATE: NORMAL
TIBC SERPL-MCNC: 198 MCG/DL (ref 298–536)
TIBC SERPL-MCNC: 380 MCG/DL (ref 298–536)
TOTAL RATE: 22 BREATHS/MINUTE
TRANSFERRIN SERPL-MCNC: 133 MG/DL (ref 200–360)
TRANSFERRIN SERPL-MCNC: 255 MG/DL (ref 200–360)
TROPONIN T SERPL-MCNC: 0.02 NG/ML (ref 0–0.03)
TROPONIN T SERPL-MCNC: 0.03 NG/ML (ref 0–0.03)
TROPONIN T SERPL-MCNC: 0.04 NG/ML (ref 0–0.03)
TROPONIN T SERPL-MCNC: 0.04 NG/ML (ref 0–0.03)
TROPONIN T SERPL-MCNC: 0.07 NG/ML (ref 0–0.03)
TSH SERPL DL<=0.05 MIU/L-ACNC: 2.65 UIU/ML (ref 0.27–4.2)
UNIT  ABO: NORMAL
UNIT  RH: NORMAL
URATE CRY URNS QL MICRO: ABNORMAL /HPF
URATE SERPL-MCNC: 5.1 MG/DL (ref 3.4–7)
URATE SERPL-MCNC: 6.1 MG/DL (ref 3.4–7)
URATE SERPL-MCNC: 7.1 MG/DL (ref 3.4–7)
URATE SERPL-MCNC: 7.7 MG/DL (ref 3.4–7)
UROBILINOGEN UR QL STRIP: ABNORMAL
UROBILINOGEN UR QL STRIP: NORMAL
VANCOMYCIN TROUGH SERPL-MCNC: 17.9 MCG/ML (ref 5–20)
VANCOMYCIN TROUGH SERPL-MCNC: 22.2 MCG/ML (ref 5–20)
VIT B12 BLD-MCNC: 1208 PG/ML (ref 211–946)
VIT B12 BLD-MCNC: 394 PG/ML (ref 211–946)
VIT B12 BLD-MCNC: 444 PG/ML (ref 211–946)
WBC MORPH BLD: NORMAL
WBC NRBC COR # BLD: 10.76 10*3/MM3 (ref 3.4–10.8)
WBC NRBC COR # BLD: 10.9 10*3/MM3 (ref 3.4–10.8)
WBC NRBC COR # BLD: 11.53 10*3/MM3 (ref 3.4–10.8)
WBC NRBC COR # BLD: 12.3 10*3/MM3 (ref 3.4–10.8)
WBC NRBC COR # BLD: 12.41 10*3/MM3 (ref 3.4–10.8)
WBC NRBC COR # BLD: 12.42 10*3/MM3 (ref 3.4–10.8)
WBC NRBC COR # BLD: 13.78 10*3/MM3 (ref 3.4–10.8)
WBC NRBC COR # BLD: 14.17 10*3/MM3 (ref 3.4–10.8)
WBC NRBC COR # BLD: 14.5 10*3/MM3 (ref 3.4–10.8)
WBC NRBC COR # BLD: 5.12 10*3/MM3 (ref 3.4–10.8)
WBC NRBC COR # BLD: 5.14 10*3/MM3 (ref 3.4–10.8)
WBC NRBC COR # BLD: 5.47 10*3/MM3 (ref 3.4–10.8)
WBC NRBC COR # BLD: 5.75 10*3/MM3 (ref 3.4–10.8)
WBC NRBC COR # BLD: 6.04 10*3/MM3 (ref 3.4–10.8)
WBC NRBC COR # BLD: 6.53 10*3/MM3 (ref 3.4–10.8)
WBC NRBC COR # BLD: 6.69 10*3/MM3 (ref 3.4–10.8)
WBC NRBC COR # BLD: 6.72 10*3/MM3 (ref 3.4–10.8)
WBC NRBC COR # BLD: 6.72 10*3/MM3 (ref 3.4–10.8)
WBC NRBC COR # BLD: 6.74 10*3/MM3 (ref 3.4–10.8)
WBC NRBC COR # BLD: 7.67 10*3/MM3 (ref 3.4–10.8)
WBC NRBC COR # BLD: 7.84 10*3/MM3 (ref 3.4–10.8)
WBC NRBC COR # BLD: 8.45 10*3/MM3 (ref 3.4–10.8)
WBC NRBC COR # BLD: 8.6 10*3/MM3 (ref 3.4–10.8)
WBC NRBC COR # BLD: 8.63 10*3/MM3 (ref 3.4–10.8)
WBC NRBC COR # BLD: 8.71 10*3/MM3 (ref 3.4–10.8)
WBC NRBC COR # BLD: 8.78 10*3/MM3 (ref 3.4–10.8)
WBC NRBC COR # BLD: 9.22 10*3/MM3 (ref 3.4–10.8)
WBC UR QL AUTO: ABNORMAL /HPF
WBC UR QL AUTO: ABNORMAL /HPF
WBC UR QL AUTO: NORMAL /HPF
WHOLE BLOOD HOLD SPECIMEN: NORMAL
WHOLE BLOOD HOLD SPECIMEN: NORMAL
YEAST URNS QL MICRO: ABNORMAL /HPF

## 2020-01-01 PROCEDURE — 80053 COMPREHEN METABOLIC PANEL: CPT | Performed by: EMERGENCY MEDICINE

## 2020-01-01 PROCEDURE — 85025 COMPLETE CBC W/AUTO DIFF WBC: CPT | Performed by: INTERNAL MEDICINE

## 2020-01-01 PROCEDURE — 72125 CT NECK SPINE W/O DYE: CPT

## 2020-01-01 PROCEDURE — 25010000002 FUROSEMIDE PER 20 MG: Performed by: INTERNAL MEDICINE

## 2020-01-01 PROCEDURE — 25010000002 ONDANSETRON PER 1 MG: Performed by: EMERGENCY MEDICINE

## 2020-01-01 PROCEDURE — 97110 THERAPEUTIC EXERCISES: CPT

## 2020-01-01 PROCEDURE — 63710000001 INSULIN REGULAR HUMAN PER 5 UNITS: Performed by: INTERNAL MEDICINE

## 2020-01-01 PROCEDURE — 99283 EMERGENCY DEPT VISIT LOW MDM: CPT

## 2020-01-01 PROCEDURE — 25010000002 LORAZEPAM PER 2 MG: Performed by: HOSPITALIST

## 2020-01-01 PROCEDURE — 85027 COMPLETE CBC AUTOMATED: CPT | Performed by: HOSPITALIST

## 2020-01-01 PROCEDURE — 99231 SBSQ HOSP IP/OBS SF/LOW 25: CPT | Performed by: SURGERY

## 2020-01-01 PROCEDURE — 84484 ASSAY OF TROPONIN QUANT: CPT | Performed by: HOSPITALIST

## 2020-01-01 PROCEDURE — 94799 UNLISTED PULMONARY SVC/PX: CPT

## 2020-01-01 PROCEDURE — 71045 X-RAY EXAM CHEST 1 VIEW: CPT

## 2020-01-01 PROCEDURE — 85007 BL SMEAR W/DIFF WBC COUNT: CPT | Performed by: PHYSICIAN ASSISTANT

## 2020-01-01 PROCEDURE — 84145 PROCALCITONIN (PCT): CPT | Performed by: INTERNAL MEDICINE

## 2020-01-01 PROCEDURE — 74178 CT ABD&PLV WO CNTR FLWD CNTR: CPT

## 2020-01-01 PROCEDURE — 82962 GLUCOSE BLOOD TEST: CPT

## 2020-01-01 PROCEDURE — 63710000001 INSULIN LISPRO (HUMAN) PER 5 UNITS: Performed by: INTERNAL MEDICINE

## 2020-01-01 PROCEDURE — 80048 BASIC METABOLIC PNL TOTAL CA: CPT | Performed by: INTERNAL MEDICINE

## 2020-01-01 PROCEDURE — 25010000002 VANCOMYCIN 10 G RECONSTITUTED SOLUTION: Performed by: INTERNAL MEDICINE

## 2020-01-01 PROCEDURE — 80202 ASSAY OF VANCOMYCIN: CPT | Performed by: INTERNAL MEDICINE

## 2020-01-01 PROCEDURE — 85027 COMPLETE CBC AUTOMATED: CPT | Performed by: INTERNAL MEDICINE

## 2020-01-01 PROCEDURE — 83735 ASSAY OF MAGNESIUM: CPT | Performed by: INTERNAL MEDICINE

## 2020-01-01 PROCEDURE — 0100U HC BIOFIRE FILMARRAY RESP PANEL 2: CPT | Performed by: EMERGENCY MEDICINE

## 2020-01-01 PROCEDURE — 99211 OFF/OP EST MAY X REQ PHY/QHP: CPT

## 2020-01-01 PROCEDURE — 85014 HEMATOCRIT: CPT | Performed by: INTERNAL MEDICINE

## 2020-01-01 PROCEDURE — 97116 GAIT TRAINING THERAPY: CPT

## 2020-01-01 PROCEDURE — 85014 HEMATOCRIT: CPT | Performed by: NURSE PRACTITIONER

## 2020-01-01 PROCEDURE — 85007 BL SMEAR W/DIFF WBC COUNT: CPT | Performed by: EMERGENCY MEDICINE

## 2020-01-01 PROCEDURE — 84550 ASSAY OF BLOOD/URIC ACID: CPT | Performed by: INTERNAL MEDICINE

## 2020-01-01 PROCEDURE — 83605 ASSAY OF LACTIC ACID: CPT | Performed by: PHYSICIAN ASSISTANT

## 2020-01-01 PROCEDURE — 43239 EGD BIOPSY SINGLE/MULTIPLE: CPT | Performed by: INTERNAL MEDICINE

## 2020-01-01 PROCEDURE — 80069 RENAL FUNCTION PANEL: CPT | Performed by: INTERNAL MEDICINE

## 2020-01-01 PROCEDURE — 82728 ASSAY OF FERRITIN: CPT | Performed by: HOSPITALIST

## 2020-01-01 PROCEDURE — 84466 ASSAY OF TRANSFERRIN: CPT | Performed by: INTERNAL MEDICINE

## 2020-01-01 PROCEDURE — 85027 COMPLETE CBC AUTOMATED: CPT | Performed by: NURSE PRACTITIONER

## 2020-01-01 PROCEDURE — 73502 X-RAY EXAM HIP UNI 2-3 VIEWS: CPT

## 2020-01-01 PROCEDURE — P9016 RBC LEUKOCYTES REDUCED: HCPCS

## 2020-01-01 PROCEDURE — 25010000002 PIPERACILLIN SOD-TAZOBACTAM PER 1 G: Performed by: INTERNAL MEDICINE

## 2020-01-01 PROCEDURE — 82550 ASSAY OF CK (CPK): CPT | Performed by: EMERGENCY MEDICINE

## 2020-01-01 PROCEDURE — 25010000002 HYDROMORPHONE PER 4 MG: Performed by: HOSPITALIST

## 2020-01-01 PROCEDURE — 84466 ASSAY OF TRANSFERRIN: CPT | Performed by: HOSPITALIST

## 2020-01-01 PROCEDURE — 82607 VITAMIN B-12: CPT | Performed by: HOSPITALIST

## 2020-01-01 PROCEDURE — 86920 COMPATIBILITY TEST SPIN: CPT

## 2020-01-01 PROCEDURE — 25010000002 HALOPERIDOL LACTATE PER 5 MG: Performed by: INTERNAL MEDICINE

## 2020-01-01 PROCEDURE — G0008 ADMIN INFLUENZA VIRUS VAC: HCPCS | Performed by: INTERNAL MEDICINE

## 2020-01-01 PROCEDURE — 36600 WITHDRAWAL OF ARTERIAL BLOOD: CPT

## 2020-01-01 PROCEDURE — 25010000002 VANCOMYCIN PER 500 MG: Performed by: INTERNAL MEDICINE

## 2020-01-01 PROCEDURE — 85025 COMPLETE CBC W/AUTO DIFF WBC: CPT | Performed by: HOSPITALIST

## 2020-01-01 PROCEDURE — 80048 BASIC METABOLIC PNL TOTAL CA: CPT | Performed by: HOSPITALIST

## 2020-01-01 PROCEDURE — 86922 COMPATIBILITY TEST ANTIGLOB: CPT

## 2020-01-01 PROCEDURE — 81003 URINALYSIS AUTO W/O SCOPE: CPT | Performed by: EMERGENCY MEDICINE

## 2020-01-01 PROCEDURE — 81001 URINALYSIS AUTO W/SCOPE: CPT | Performed by: EMERGENCY MEDICINE

## 2020-01-01 PROCEDURE — 82550 ASSAY OF CK (CPK): CPT | Performed by: INTERNAL MEDICINE

## 2020-01-01 PROCEDURE — 85025 COMPLETE CBC W/AUTO DIFF WBC: CPT | Performed by: EMERGENCY MEDICINE

## 2020-01-01 PROCEDURE — 88305 TISSUE EXAM BY PATHOLOGIST: CPT | Performed by: INTERNAL MEDICINE

## 2020-01-01 PROCEDURE — 99232 SBSQ HOSP IP/OBS MODERATE 35: CPT | Performed by: INTERNAL MEDICINE

## 2020-01-01 PROCEDURE — 87205 SMEAR GRAM STAIN: CPT | Performed by: INTERNAL MEDICINE

## 2020-01-01 PROCEDURE — 80053 COMPREHEN METABOLIC PANEL: CPT | Performed by: NURSE PRACTITIONER

## 2020-01-01 PROCEDURE — 25010000002 IOPAMIDOL 61 % SOLUTION: Performed by: EMERGENCY MEDICINE

## 2020-01-01 PROCEDURE — 36415 COLL VENOUS BLD VENIPUNCTURE: CPT | Performed by: NURSE PRACTITIONER

## 2020-01-01 PROCEDURE — 83605 ASSAY OF LACTIC ACID: CPT | Performed by: NURSE PRACTITIONER

## 2020-01-01 PROCEDURE — 86870 RBC ANTIBODY IDENTIFICATION: CPT | Performed by: INTERNAL MEDICINE

## 2020-01-01 PROCEDURE — 81001 URINALYSIS AUTO W/SCOPE: CPT | Performed by: INTERNAL MEDICINE

## 2020-01-01 PROCEDURE — 90686 IIV4 VACC NO PRSV 0.5 ML IM: CPT | Performed by: INTERNAL MEDICINE

## 2020-01-01 PROCEDURE — 96361 HYDRATE IV INFUSION ADD-ON: CPT

## 2020-01-01 PROCEDURE — 97535 SELF CARE MNGMENT TRAINING: CPT

## 2020-01-01 PROCEDURE — 70450 CT HEAD/BRAIN W/O DYE: CPT

## 2020-01-01 PROCEDURE — 93010 ELECTROCARDIOGRAM REPORT: CPT | Performed by: INTERNAL MEDICINE

## 2020-01-01 PROCEDURE — 0D20XUZ CHANGE FEEDING DEVICE IN UPPER INTESTINAL TRACT, EXTERNAL APPROACH: ICD-10-PCS | Performed by: INTERNAL MEDICINE

## 2020-01-01 PROCEDURE — 86900 BLOOD TYPING SEROLOGIC ABO: CPT

## 2020-01-01 PROCEDURE — 87635 SARS-COV-2 COVID-19 AMP PRB: CPT | Performed by: PHYSICIAN ASSISTANT

## 2020-01-01 PROCEDURE — 87147 CULTURE TYPE IMMUNOLOGIC: CPT | Performed by: INTERNAL MEDICINE

## 2020-01-01 PROCEDURE — 87186 SC STD MICRODIL/AGAR DIL: CPT | Performed by: INTERNAL MEDICINE

## 2020-01-01 PROCEDURE — 25010000002 CEFTRIAXONE PER 250 MG: Performed by: INTERNAL MEDICINE

## 2020-01-01 PROCEDURE — 82306 VITAMIN D 25 HYDROXY: CPT | Performed by: HOSPITALIST

## 2020-01-01 PROCEDURE — 82607 VITAMIN B-12: CPT | Performed by: INTERNAL MEDICINE

## 2020-01-01 PROCEDURE — 73090 X-RAY EXAM OF FOREARM: CPT

## 2020-01-01 PROCEDURE — 84132 ASSAY OF SERUM POTASSIUM: CPT | Performed by: INTERNAL MEDICINE

## 2020-01-01 PROCEDURE — 80048 BASIC METABOLIC PNL TOTAL CA: CPT | Performed by: NURSE PRACTITIONER

## 2020-01-01 PROCEDURE — 84295 ASSAY OF SERUM SODIUM: CPT | Performed by: INTERNAL MEDICINE

## 2020-01-01 PROCEDURE — 25010000002 IRON SUCROSE PER 1 MG: Performed by: INTERNAL MEDICINE

## 2020-01-01 PROCEDURE — 83690 ASSAY OF LIPASE: CPT | Performed by: EMERGENCY MEDICINE

## 2020-01-01 PROCEDURE — 97161 PT EVAL LOW COMPLEX 20 MIN: CPT

## 2020-01-01 PROCEDURE — 82728 ASSAY OF FERRITIN: CPT | Performed by: INTERNAL MEDICINE

## 2020-01-01 PROCEDURE — 81003 URINALYSIS AUTO W/O SCOPE: CPT | Performed by: PHYSICIAN ASSISTANT

## 2020-01-01 PROCEDURE — 97162 PT EVAL MOD COMPLEX 30 MIN: CPT

## 2020-01-01 PROCEDURE — 96374 THER/PROPH/DIAG INJ IV PUSH: CPT

## 2020-01-01 PROCEDURE — 36415 COLL VENOUS BLD VENIPUNCTURE: CPT

## 2020-01-01 PROCEDURE — 72192 CT PELVIS W/O DYE: CPT

## 2020-01-01 PROCEDURE — 85025 COMPLETE CBC W/AUTO DIFF WBC: CPT | Performed by: PHYSICIAN ASSISTANT

## 2020-01-01 PROCEDURE — 85045 AUTOMATED RETICULOCYTE COUNT: CPT | Performed by: HOSPITALIST

## 2020-01-01 PROCEDURE — 85018 HEMOGLOBIN: CPT | Performed by: NURSE PRACTITIONER

## 2020-01-01 PROCEDURE — 25010000002 VANCOMYCIN 750 MG RECONSTITUTED SOLUTION: Performed by: INTERNAL MEDICINE

## 2020-01-01 PROCEDURE — 85018 HEMOGLOBIN: CPT | Performed by: INTERNAL MEDICINE

## 2020-01-01 PROCEDURE — 84100 ASSAY OF PHOSPHORUS: CPT | Performed by: INTERNAL MEDICINE

## 2020-01-01 PROCEDURE — 74177 CT ABD & PELVIS W/CONTRAST: CPT

## 2020-01-01 PROCEDURE — 87040 BLOOD CULTURE FOR BACTERIA: CPT | Performed by: INTERNAL MEDICINE

## 2020-01-01 PROCEDURE — 83036 HEMOGLOBIN GLYCOSYLATED A1C: CPT | Performed by: INTERNAL MEDICINE

## 2020-01-01 PROCEDURE — 25010000002 IOPAMIDOL 61 % SOLUTION: Performed by: INTERNAL MEDICINE

## 2020-01-01 PROCEDURE — 82746 ASSAY OF FOLIC ACID SERUM: CPT | Performed by: HOSPITALIST

## 2020-01-01 PROCEDURE — 99285 EMERGENCY DEPT VISIT HI MDM: CPT

## 2020-01-01 PROCEDURE — 84484 ASSAY OF TROPONIN QUANT: CPT | Performed by: EMERGENCY MEDICINE

## 2020-01-01 PROCEDURE — 80053 COMPREHEN METABOLIC PANEL: CPT | Performed by: PHYSICIAN ASSISTANT

## 2020-01-01 PROCEDURE — 84484 ASSAY OF TROPONIN QUANT: CPT | Performed by: NURSE PRACTITIONER

## 2020-01-01 PROCEDURE — 85025 COMPLETE CBC W/AUTO DIFF WBC: CPT | Performed by: NURSE PRACTITIONER

## 2020-01-01 PROCEDURE — 83690 ASSAY OF LIPASE: CPT | Performed by: PHYSICIAN ASSISTANT

## 2020-01-01 PROCEDURE — 84443 ASSAY THYROID STIM HORMONE: CPT | Performed by: INTERNAL MEDICINE

## 2020-01-01 PROCEDURE — 84300 ASSAY OF URINE SODIUM: CPT | Performed by: INTERNAL MEDICINE

## 2020-01-01 PROCEDURE — 83735 ASSAY OF MAGNESIUM: CPT | Performed by: HOSPITALIST

## 2020-01-01 PROCEDURE — 83935 ASSAY OF URINE OSMOLALITY: CPT | Performed by: INTERNAL MEDICINE

## 2020-01-01 PROCEDURE — 97166 OT EVAL MOD COMPLEX 45 MIN: CPT

## 2020-01-01 PROCEDURE — 87086 URINE CULTURE/COLONY COUNT: CPT | Performed by: INTERNAL MEDICINE

## 2020-01-01 PROCEDURE — 94669 MECHANICAL CHEST WALL OSCILL: CPT

## 2020-01-01 PROCEDURE — 97112 NEUROMUSCULAR REEDUCATION: CPT | Performed by: OCCUPATIONAL THERAPIST

## 2020-01-01 PROCEDURE — 83540 ASSAY OF IRON: CPT | Performed by: INTERNAL MEDICINE

## 2020-01-01 PROCEDURE — 83880 ASSAY OF NATRIURETIC PEPTIDE: CPT | Performed by: INTERNAL MEDICINE

## 2020-01-01 PROCEDURE — 86850 RBC ANTIBODY SCREEN: CPT | Performed by: INTERNAL MEDICINE

## 2020-01-01 PROCEDURE — 81003 URINALYSIS AUTO W/O SCOPE: CPT | Performed by: NURSE PRACTITIONER

## 2020-01-01 PROCEDURE — 97535 SELF CARE MNGMENT TRAINING: CPT | Performed by: OCCUPATIONAL THERAPIST

## 2020-01-01 PROCEDURE — 86901 BLOOD TYPING SEROLOGIC RH(D): CPT | Performed by: INTERNAL MEDICINE

## 2020-01-01 PROCEDURE — 83735 ASSAY OF MAGNESIUM: CPT | Performed by: EMERGENCY MEDICINE

## 2020-01-01 PROCEDURE — 63710000001 INSULIN LISPRO (HUMAN) PER 5 UNITS: Performed by: NURSE PRACTITIONER

## 2020-01-01 PROCEDURE — 83540 ASSAY OF IRON: CPT | Performed by: HOSPITALIST

## 2020-01-01 PROCEDURE — 85007 BL SMEAR W/DIFF WBC COUNT: CPT | Performed by: HOSPITALIST

## 2020-01-01 PROCEDURE — 99284 EMERGENCY DEPT VISIT MOD MDM: CPT

## 2020-01-01 PROCEDURE — 74019 RADEX ABDOMEN 2 VIEWS: CPT

## 2020-01-01 PROCEDURE — 82803 BLOOD GASES ANY COMBINATION: CPT

## 2020-01-01 PROCEDURE — 99222 1ST HOSP IP/OBS MODERATE 55: CPT | Performed by: INTERNAL MEDICINE

## 2020-01-01 PROCEDURE — 25010000002 MORPHINE PER 10 MG: Performed by: EMERGENCY MEDICINE

## 2020-01-01 PROCEDURE — 84484 ASSAY OF TROPONIN QUANT: CPT | Performed by: INTERNAL MEDICINE

## 2020-01-01 PROCEDURE — 96360 HYDRATION IV INFUSION INIT: CPT

## 2020-01-01 PROCEDURE — 93005 ELECTROCARDIOGRAM TRACING: CPT | Performed by: EMERGENCY MEDICINE

## 2020-01-01 PROCEDURE — 90791 PSYCH DIAGNOSTIC EVALUATION: CPT

## 2020-01-01 PROCEDURE — 82550 ASSAY OF CK (CPK): CPT | Performed by: HOSPITALIST

## 2020-01-01 PROCEDURE — 94640 AIRWAY INHALATION TREATMENT: CPT

## 2020-01-01 PROCEDURE — 84145 PROCALCITONIN (PCT): CPT | Performed by: EMERGENCY MEDICINE

## 2020-01-01 PROCEDURE — 85007 BL SMEAR W/DIFF WBC COUNT: CPT | Performed by: INTERNAL MEDICINE

## 2020-01-01 PROCEDURE — 36430 TRANSFUSION BLD/BLD COMPNT: CPT

## 2020-01-01 PROCEDURE — 86905 BLOOD TYPING RBC ANTIGENS: CPT | Performed by: INTERNAL MEDICINE

## 2020-01-01 PROCEDURE — 80048 BASIC METABOLIC PNL TOTAL CA: CPT | Performed by: EMERGENCY MEDICINE

## 2020-01-01 PROCEDURE — 84145 PROCALCITONIN (PCT): CPT | Performed by: PHYSICIAN ASSISTANT

## 2020-01-01 PROCEDURE — P9612 CATHETERIZE FOR URINE SPEC: HCPCS

## 2020-01-01 PROCEDURE — 87070 CULTURE OTHR SPECIMN AEROBIC: CPT | Performed by: INTERNAL MEDICINE

## 2020-01-01 PROCEDURE — 90791 PSYCH DIAGNOSTIC EVALUATION: CPT | Performed by: SOCIAL WORKER

## 2020-01-01 PROCEDURE — 84100 ASSAY OF PHOSPHORUS: CPT | Performed by: HOSPITALIST

## 2020-01-01 PROCEDURE — 25010000002 INFLUENZA VAC SPLIT QUAD 0.5 ML SUSPENSION PREFILLED SYRINGE: Performed by: INTERNAL MEDICINE

## 2020-01-01 PROCEDURE — 86900 BLOOD TYPING SEROLOGIC ABO: CPT | Performed by: INTERNAL MEDICINE

## 2020-01-01 PROCEDURE — 85610 PROTHROMBIN TIME: CPT | Performed by: EMERGENCY MEDICINE

## 2020-01-01 PROCEDURE — 25010000002 PROPOFOL 10 MG/ML EMULSION: Performed by: ANESTHESIOLOGY

## 2020-01-01 PROCEDURE — 0DB78ZX EXCISION OF STOMACH, PYLORUS, VIA NATURAL OR ARTIFICIAL OPENING ENDOSCOPIC, DIAGNOSTIC: ICD-10-PCS | Performed by: INTERNAL MEDICINE

## 2020-01-01 PROCEDURE — 85007 BL SMEAR W/DIFF WBC COUNT: CPT | Performed by: NURSE PRACTITIONER

## 2020-01-01 PROCEDURE — 93005 ELECTROCARDIOGRAM TRACING: CPT | Performed by: INTERNAL MEDICINE

## 2020-01-01 PROCEDURE — 99221 1ST HOSP IP/OBS SF/LOW 40: CPT | Performed by: SURGERY

## 2020-01-01 PROCEDURE — 86901 BLOOD TYPING SEROLOGIC RH(D): CPT

## 2020-01-01 PROCEDURE — 96375 TX/PRO/DX INJ NEW DRUG ADDON: CPT

## 2020-01-01 PROCEDURE — 0B21XFZ CHANGE TRACHEOSTOMY DEVICE IN TRACHEA, EXTERNAL APPROACH: ICD-10-PCS | Performed by: INTERNAL MEDICINE

## 2020-01-01 PROCEDURE — 86902 BLOOD TYPE ANTIGEN DONOR EA: CPT

## 2020-01-01 RX ORDER — MORPHINE SULFATE 2 MG/ML
2 INJECTION, SOLUTION INTRAMUSCULAR; INTRAVENOUS
Status: DISCONTINUED | OUTPATIENT
Start: 2020-01-01 | End: 2020-01-01 | Stop reason: HOSPADM

## 2020-01-01 RX ORDER — ACETAMINOPHEN 160 MG/5ML
650 SOLUTION ORAL EVERY 4 HOURS PRN
Status: DISCONTINUED | OUTPATIENT
Start: 2020-01-01 | End: 2020-01-01

## 2020-01-01 RX ORDER — SODIUM CHLORIDE 0.9 % (FLUSH) 0.9 %
10 SYRINGE (ML) INJECTION AS NEEDED
Status: DISCONTINUED | OUTPATIENT
Start: 2020-01-01 | End: 2020-01-01 | Stop reason: HOSPADM

## 2020-01-01 RX ORDER — MORPHINE SULFATE 10 MG/ML
6 INJECTION INTRAMUSCULAR; INTRAVENOUS; SUBCUTANEOUS
Status: DISCONTINUED | OUTPATIENT
Start: 2020-01-01 | End: 2020-01-01 | Stop reason: HOSPADM

## 2020-01-01 RX ORDER — LORAZEPAM 2 MG/ML
1 CONCENTRATE ORAL
Status: DISCONTINUED | OUTPATIENT
Start: 2020-01-01 | End: 2020-01-01 | Stop reason: HOSPADM

## 2020-01-01 RX ORDER — DEXTROSE MONOHYDRATE 50 MG/ML
100 INJECTION, SOLUTION INTRAVENOUS CONTINUOUS
Status: DISCONTINUED | OUTPATIENT
Start: 2020-01-01 | End: 2020-01-01

## 2020-01-01 RX ORDER — POTASSIUM CHLORIDE 750 MG/1
40 CAPSULE, EXTENDED RELEASE ORAL AS NEEDED
Status: DISCONTINUED | OUTPATIENT
Start: 2020-01-01 | End: 2020-01-01

## 2020-01-01 RX ORDER — MAGNESIUM SULFATE HEPTAHYDRATE 40 MG/ML
4 INJECTION, SOLUTION INTRAVENOUS AS NEEDED
Status: DISCONTINUED | OUTPATIENT
Start: 2020-01-01 | End: 2020-01-01

## 2020-01-01 RX ORDER — LIDOCAINE 50 MG/G
1 PATCH TOPICAL ONCE
Status: DISCONTINUED | OUTPATIENT
Start: 2020-01-01 | End: 2020-01-01 | Stop reason: HOSPADM

## 2020-01-01 RX ORDER — MORPHINE SULFATE 20 MG/ML
5 SOLUTION ORAL
Status: DISCONTINUED | OUTPATIENT
Start: 2020-01-01 | End: 2020-01-01 | Stop reason: HOSPADM

## 2020-01-01 RX ORDER — BISACODYL 10 MG
10 SUPPOSITORY, RECTAL RECTAL DAILY PRN
Status: DISCONTINUED | OUTPATIENT
Start: 2020-01-01 | End: 2020-01-01

## 2020-01-01 RX ORDER — DIPHENOXYLATE HYDROCHLORIDE AND ATROPINE SULFATE 2.5; .025 MG/1; MG/1
1 TABLET ORAL
Status: DISCONTINUED | OUTPATIENT
Start: 2020-01-01 | End: 2020-01-01 | Stop reason: HOSPADM

## 2020-01-01 RX ORDER — MAGNESIUM SULFATE HEPTAHYDRATE 40 MG/ML
2 INJECTION, SOLUTION INTRAVENOUS AS NEEDED
Status: DISCONTINUED | OUTPATIENT
Start: 2020-01-01 | End: 2020-01-01

## 2020-01-01 RX ORDER — SCOLOPAMINE TRANSDERMAL SYSTEM 1 MG/1
1 PATCH, EXTENDED RELEASE TRANSDERMAL
Status: CANCELLED | OUTPATIENT
Start: 2020-01-01

## 2020-01-01 RX ORDER — OLANZAPINE 5 MG/1
5 TABLET ORAL NIGHTLY
Status: DISCONTINUED | OUTPATIENT
Start: 2020-01-01 | End: 2020-01-01

## 2020-01-01 RX ORDER — ACETAMINOPHEN 325 MG/1
650 TABLET ORAL EVERY 4 HOURS PRN
Status: DISCONTINUED | OUTPATIENT
Start: 2020-01-01 | End: 2020-01-01 | Stop reason: HOSPADM

## 2020-01-01 RX ORDER — PROPOFOL 10 MG/ML
VIAL (ML) INTRAVENOUS AS NEEDED
Status: DISCONTINUED | OUTPATIENT
Start: 2020-01-01 | End: 2020-01-01 | Stop reason: SURG

## 2020-01-01 RX ORDER — ACETAMINOPHEN 160 MG/5ML
650 SOLUTION ORAL EVERY 4 HOURS PRN
Status: DISCONTINUED | OUTPATIENT
Start: 2020-01-01 | End: 2020-01-01 | Stop reason: HOSPADM

## 2020-01-01 RX ORDER — LORAZEPAM 2 MG/ML
0.5 INJECTION INTRAMUSCULAR
Status: CANCELLED | OUTPATIENT
Start: 2020-01-01 | End: 2020-05-18

## 2020-01-01 RX ORDER — LORAZEPAM 2 MG/ML
0.5 CONCENTRATE ORAL
Status: DISCONTINUED | OUTPATIENT
Start: 2020-01-01 | End: 2020-01-01 | Stop reason: HOSPADM

## 2020-01-01 RX ORDER — IPRATROPIUM BROMIDE AND ALBUTEROL SULFATE 2.5; .5 MG/3ML; MG/3ML
3 SOLUTION RESPIRATORY (INHALATION) 2 TIMES DAILY
Status: DISCONTINUED | OUTPATIENT
Start: 2020-01-01 | End: 2020-01-01 | Stop reason: HOSPADM

## 2020-01-01 RX ORDER — ACETAMINOPHEN 160 MG/5ML
650 SOLUTION ORAL EVERY 4 HOURS PRN
Status: CANCELLED | OUTPATIENT
Start: 2020-01-01

## 2020-01-01 RX ORDER — HYDROMORPHONE HYDROCHLORIDE 1 MG/ML
0.5 INJECTION, SOLUTION INTRAMUSCULAR; INTRAVENOUS; SUBCUTANEOUS
Status: CANCELLED | OUTPATIENT
Start: 2020-01-01 | End: 2020-05-18

## 2020-01-01 RX ORDER — POTASSIUM CHLORIDE 1.5 G/1.77G
20 POWDER, FOR SOLUTION ORAL 2 TIMES DAILY
Status: DISCONTINUED | OUTPATIENT
Start: 2020-01-01 | End: 2020-01-01 | Stop reason: HOSPADM

## 2020-01-01 RX ORDER — HYDROCODONE BITARTRATE AND ACETAMINOPHEN 5; 325 MG/1; MG/1
1 TABLET ORAL ONCE
Status: DISCONTINUED | OUTPATIENT
Start: 2020-01-01 | End: 2020-01-01 | Stop reason: HOSPADM

## 2020-01-01 RX ORDER — ACETAMINOPHEN 650 MG/1
650 SUPPOSITORY RECTAL EVERY 4 HOURS PRN
Status: CANCELLED | OUTPATIENT
Start: 2020-01-01

## 2020-01-01 RX ORDER — FUROSEMIDE 10 MG/ML
40 INJECTION INTRAMUSCULAR; INTRAVENOUS
Status: DISCONTINUED | OUTPATIENT
Start: 2020-01-01 | End: 2020-01-01

## 2020-01-01 RX ORDER — DOCUSATE SODIUM 50 MG/5 ML
100 LIQUID (ML) ORAL DAILY
Qty: 237 ML | Refills: 0 | Status: SHIPPED | OUTPATIENT
Start: 2020-01-01

## 2020-01-01 RX ORDER — LIDOCAINE HYDROCHLORIDE 10 MG/ML
0.5 INJECTION, SOLUTION INFILTRATION; PERINEURAL ONCE AS NEEDED
Status: DISCONTINUED | OUTPATIENT
Start: 2020-01-01 | End: 2020-01-01

## 2020-01-01 RX ORDER — QUETIAPINE FUMARATE 25 MG/1
25 TABLET, FILM COATED ORAL EVERY 6 HOURS PRN
Status: DISCONTINUED | OUTPATIENT
Start: 2020-01-01 | End: 2020-01-01 | Stop reason: HOSPADM

## 2020-01-01 RX ORDER — SODIUM CHLORIDE 0.9 % (FLUSH) 0.9 %
10 SYRINGE (ML) INJECTION AS NEEDED
Status: DISCONTINUED | OUTPATIENT
Start: 2020-01-01 | End: 2020-01-01

## 2020-01-01 RX ORDER — MORPHINE SULFATE 20 MG/ML
10 SOLUTION ORAL
Status: DISCONTINUED | OUTPATIENT
Start: 2020-01-01 | End: 2020-01-01 | Stop reason: HOSPADM

## 2020-01-01 RX ORDER — MORPHINE SULFATE 20 MG/ML
10 SOLUTION ORAL
Status: CANCELLED | OUTPATIENT
Start: 2020-01-01 | End: 2020-05-19

## 2020-01-01 RX ORDER — SODIUM CHLORIDE 0.9 % (FLUSH) 0.9 %
10 SYRINGE (ML) INJECTION EVERY 12 HOURS SCHEDULED
Status: DISCONTINUED | OUTPATIENT
Start: 2020-01-01 | End: 2020-01-01

## 2020-01-01 RX ORDER — SCOLOPAMINE TRANSDERMAL SYSTEM 1 MG/1
1 PATCH, EXTENDED RELEASE TRANSDERMAL
Status: DISCONTINUED | OUTPATIENT
Start: 2020-01-01 | End: 2020-01-01 | Stop reason: HOSPADM

## 2020-01-01 RX ORDER — LANSOPRAZOLE 30 MG/1
30 CAPSULE, DELAYED RELEASE ORAL
Qty: 30 CAPSULE | Refills: 0 | Status: ON HOLD | OUTPATIENT
Start: 2020-01-01 | End: 2020-01-01

## 2020-01-01 RX ORDER — VANCOMYCIN HYDROCHLORIDE 1 G/200ML
1000 INJECTION, SOLUTION INTRAVENOUS EVERY 12 HOURS
Status: DISCONTINUED | OUTPATIENT
Start: 2020-01-01 | End: 2020-01-01 | Stop reason: DRUGHIGH

## 2020-01-01 RX ORDER — MORPHINE SULFATE 2 MG/ML
4 INJECTION, SOLUTION INTRAMUSCULAR; INTRAVENOUS ONCE
Status: COMPLETED | OUTPATIENT
Start: 2020-01-01 | End: 2020-01-01

## 2020-01-01 RX ORDER — SODIUM CHLORIDE 9 MG/ML
75 INJECTION, SOLUTION INTRAVENOUS CONTINUOUS
Status: DISCONTINUED | OUTPATIENT
Start: 2020-01-01 | End: 2020-01-01

## 2020-01-01 RX ORDER — MIRTAZAPINE 15 MG/1
15 TABLET, ORALLY DISINTEGRATING ORAL NIGHTLY
Status: DISCONTINUED | OUTPATIENT
Start: 2020-01-01 | End: 2020-01-01

## 2020-01-01 RX ORDER — DEXTROSE MONOHYDRATE 25 G/50ML
25 INJECTION, SOLUTION INTRAVENOUS
Status: DISCONTINUED | OUTPATIENT
Start: 2020-01-01 | End: 2020-01-01

## 2020-01-01 RX ORDER — ONDANSETRON 2 MG/ML
4 INJECTION INTRAMUSCULAR; INTRAVENOUS ONCE
Status: COMPLETED | OUTPATIENT
Start: 2020-01-01 | End: 2020-01-01

## 2020-01-01 RX ORDER — MORPHINE SULFATE 2 MG/ML
2 INJECTION, SOLUTION INTRAMUSCULAR; INTRAVENOUS
Status: CANCELLED | OUTPATIENT
Start: 2020-01-01 | End: 2020-05-18

## 2020-01-01 RX ORDER — HALOPERIDOL 5 MG/ML
1 INJECTION INTRAMUSCULAR EVERY 6 HOURS PRN
Status: DISCONTINUED | OUTPATIENT
Start: 2020-01-01 | End: 2020-01-01

## 2020-01-01 RX ORDER — SODIUM CHLORIDE 9 MG/ML
125 INJECTION, SOLUTION INTRAVENOUS CONTINUOUS
Status: DISCONTINUED | OUTPATIENT
Start: 2020-01-01 | End: 2020-01-01

## 2020-01-01 RX ORDER — DIPHENOXYLATE HYDROCHLORIDE AND ATROPINE SULFATE 2.5; .025 MG/1; MG/1
1 TABLET ORAL
Status: CANCELLED | OUTPATIENT
Start: 2020-01-01 | End: 2020-05-18

## 2020-01-01 RX ORDER — FAMOTIDINE 20 MG/1
20 TABLET, FILM COATED ORAL
Status: DISCONTINUED | OUTPATIENT
Start: 2020-01-01 | End: 2020-01-01

## 2020-01-01 RX ORDER — LORAZEPAM 2 MG/ML
0.5 CONCENTRATE ORAL
Status: CANCELLED | OUTPATIENT
Start: 2020-01-01 | End: 2020-05-18

## 2020-01-01 RX ORDER — VANCOMYCIN HYDROCHLORIDE 1 G/200ML
15 INJECTION, SOLUTION INTRAVENOUS EVERY 12 HOURS
Status: DISCONTINUED | OUTPATIENT
Start: 2020-01-01 | End: 2020-01-01 | Stop reason: SDUPTHER

## 2020-01-01 RX ORDER — SODIUM CHLORIDE FOR INHALATION 7 %
4 VIAL, NEBULIZER (ML) INHALATION
Status: DISCONTINUED | OUTPATIENT
Start: 2020-01-01 | End: 2020-01-01

## 2020-01-01 RX ORDER — QUETIAPINE FUMARATE 25 MG/1
25 TABLET, FILM COATED ORAL NIGHTLY
Status: DISCONTINUED | OUTPATIENT
Start: 2020-01-01 | End: 2020-01-01

## 2020-01-01 RX ORDER — MORPHINE SULFATE 4 MG/ML
4 INJECTION, SOLUTION INTRAMUSCULAR; INTRAVENOUS
Status: DISCONTINUED | OUTPATIENT
Start: 2020-01-01 | End: 2020-01-01 | Stop reason: HOSPADM

## 2020-01-01 RX ORDER — LORAZEPAM 2 MG/ML
1 CONCENTRATE ORAL
Status: CANCELLED | OUTPATIENT
Start: 2020-01-01 | End: 2020-05-18

## 2020-01-01 RX ORDER — POTASSIUM CHLORIDE 1500 MG/1
20 TABLET, FILM COATED, EXTENDED RELEASE ORAL 2 TIMES DAILY
Qty: 60 TABLET | Refills: 0 | Status: ON HOLD | OUTPATIENT
Start: 2020-01-01 | End: 2020-01-01

## 2020-01-01 RX ORDER — LORAZEPAM 2 MG/ML
1 INJECTION INTRAMUSCULAR
Status: CANCELLED | OUTPATIENT
Start: 2020-01-01 | End: 2020-05-18

## 2020-01-01 RX ORDER — MORPHINE SULFATE 4 MG/ML
4 INJECTION, SOLUTION INTRAMUSCULAR; INTRAVENOUS
Status: CANCELLED | OUTPATIENT
Start: 2020-01-01 | End: 2020-05-19

## 2020-01-01 RX ORDER — POTASSIUM CHLORIDE 750 MG/1
40 CAPSULE, EXTENDED RELEASE ORAL EVERY 4 HOURS
Status: COMPLETED | OUTPATIENT
Start: 2020-01-01 | End: 2020-01-01

## 2020-01-01 RX ORDER — ACETAMINOPHEN 325 MG/1
650 TABLET ORAL EVERY 4 HOURS PRN
Status: CANCELLED | OUTPATIENT
Start: 2020-01-01

## 2020-01-01 RX ORDER — ACETAMINOPHEN 650 MG/1
650 SUPPOSITORY RECTAL EVERY 4 HOURS PRN
Status: DISCONTINUED | OUTPATIENT
Start: 2020-01-01 | End: 2020-01-01

## 2020-01-01 RX ORDER — DEXTROSE MONOHYDRATE 50 MG/ML
50 INJECTION, SOLUTION INTRAVENOUS CONTINUOUS
Status: DISCONTINUED | OUTPATIENT
Start: 2020-01-01 | End: 2020-01-01

## 2020-01-01 RX ORDER — ACETAMINOPHEN 325 MG/1
650 TABLET ORAL ONCE
Status: COMPLETED | OUTPATIENT
Start: 2020-01-01 | End: 2020-01-01

## 2020-01-01 RX ORDER — LANSOPRAZOLE
30 KIT
Status: DISCONTINUED | OUTPATIENT
Start: 2020-01-01 | End: 2020-01-01 | Stop reason: HOSPADM

## 2020-01-01 RX ORDER — MIRTAZAPINE 15 MG/1
15 TABLET, ORALLY DISINTEGRATING ORAL NIGHTLY
Status: DISCONTINUED | OUTPATIENT
Start: 2020-01-01 | End: 2020-01-01 | Stop reason: HOSPADM

## 2020-01-01 RX ORDER — LORAZEPAM 2 MG/ML
2 INJECTION INTRAMUSCULAR
Status: DISCONTINUED | OUTPATIENT
Start: 2020-01-01 | End: 2020-01-01 | Stop reason: HOSPADM

## 2020-01-01 RX ORDER — ONDANSETRON 4 MG/1
4 TABLET, ORALLY DISINTEGRATING ORAL 4 TIMES DAILY
Qty: 12 TABLET | Refills: 0 | Status: ON HOLD | OUTPATIENT
Start: 2020-01-01 | End: 2020-01-01

## 2020-01-01 RX ORDER — HYDROMORPHONE HYDROCHLORIDE 1 MG/ML
0.5 INJECTION, SOLUTION INTRAMUSCULAR; INTRAVENOUS; SUBCUTANEOUS
Status: DISCONTINUED | OUTPATIENT
Start: 2020-01-01 | End: 2020-01-01 | Stop reason: HOSPADM

## 2020-01-01 RX ORDER — ONDANSETRON 2 MG/ML
4 INJECTION INTRAMUSCULAR; INTRAVENOUS EVERY 6 HOURS PRN
Status: DISCONTINUED | OUTPATIENT
Start: 2020-01-01 | End: 2020-01-01 | Stop reason: HOSPADM

## 2020-01-01 RX ORDER — SODIUM CHLORIDE, SODIUM LACTATE, POTASSIUM CHLORIDE, CALCIUM CHLORIDE 600; 310; 30; 20 MG/100ML; MG/100ML; MG/100ML; MG/100ML
30 INJECTION, SOLUTION INTRAVENOUS CONTINUOUS
Status: DISCONTINUED | OUTPATIENT
Start: 2020-01-01 | End: 2020-01-01

## 2020-01-01 RX ORDER — SODIUM CHLORIDE 450 MG/100ML
100 INJECTION, SOLUTION INTRAVENOUS CONTINUOUS
Status: DISCONTINUED | OUTPATIENT
Start: 2020-01-01 | End: 2020-01-01

## 2020-01-01 RX ORDER — LIDOCAINE 50 MG/G
1 PATCH TOPICAL EVERY 24 HOURS
Qty: 30 PATCH | Refills: 0 | Status: ON HOLD | OUTPATIENT
Start: 2020-01-01 | End: 2020-01-01

## 2020-01-01 RX ORDER — HYDROMORPHONE HCL 110MG/55ML
1.5 PATIENT CONTROLLED ANALGESIA SYRINGE INTRAVENOUS
Status: DISCONTINUED | OUTPATIENT
Start: 2020-01-01 | End: 2020-01-01 | Stop reason: HOSPADM

## 2020-01-01 RX ORDER — LORAZEPAM 2 MG/ML
2 CONCENTRATE ORAL
Status: DISCONTINUED | OUTPATIENT
Start: 2020-01-01 | End: 2020-01-01 | Stop reason: HOSPADM

## 2020-01-01 RX ORDER — MORPHINE SULFATE 20 MG/ML
20 SOLUTION ORAL
Status: DISCONTINUED | OUTPATIENT
Start: 2020-01-01 | End: 2020-01-01 | Stop reason: HOSPADM

## 2020-01-01 RX ORDER — LORAZEPAM 2 MG/ML
2 INJECTION INTRAMUSCULAR
Status: CANCELLED | OUTPATIENT
Start: 2020-01-01 | End: 2020-05-18

## 2020-01-01 RX ORDER — DOXYCYCLINE 100 MG/1
100 CAPSULE ORAL EVERY 12 HOURS SCHEDULED
Status: DISCONTINUED | OUTPATIENT
Start: 2020-01-01 | End: 2020-01-01

## 2020-01-01 RX ORDER — FERROUS SULFATE 300 MG/5ML
300 LIQUID (ML) ORAL DAILY
Status: DISCONTINUED | OUTPATIENT
Start: 2020-01-01 | End: 2020-01-01

## 2020-01-01 RX ORDER — LORAZEPAM 2 MG/ML
1 INJECTION INTRAMUSCULAR
Status: DISCONTINUED | OUTPATIENT
Start: 2020-01-01 | End: 2020-01-01 | Stop reason: HOSPADM

## 2020-01-01 RX ORDER — HALOPERIDOL 5 MG/ML
2 INJECTION INTRAMUSCULAR EVERY 6 HOURS PRN
Status: DISCONTINUED | OUTPATIENT
Start: 2020-01-01 | End: 2020-01-01

## 2020-01-01 RX ORDER — MORPHINE SULFATE 20 MG/ML
5 SOLUTION ORAL
Status: CANCELLED | OUTPATIENT
Start: 2020-01-01 | End: 2020-05-18

## 2020-01-01 RX ORDER — LIDOCAINE HYDROCHLORIDE 20 MG/ML
INJECTION, SOLUTION INFILTRATION; PERINEURAL AS NEEDED
Status: DISCONTINUED | OUTPATIENT
Start: 2020-01-01 | End: 2020-01-01 | Stop reason: SURG

## 2020-01-01 RX ORDER — NITROGLYCERIN 0.4 MG/1
0.4 TABLET SUBLINGUAL
Status: DISCONTINUED | OUTPATIENT
Start: 2020-01-01 | End: 2020-01-01 | Stop reason: HOSPADM

## 2020-01-01 RX ORDER — DEXTROSE MONOHYDRATE 50 MG/ML
75 INJECTION, SOLUTION INTRAVENOUS CONTINUOUS
Status: DISCONTINUED | OUTPATIENT
Start: 2020-01-01 | End: 2020-01-01

## 2020-01-01 RX ORDER — NICOTINE POLACRILEX 4 MG
15 LOZENGE BUCCAL
Status: DISCONTINUED | OUTPATIENT
Start: 2020-01-01 | End: 2020-01-01

## 2020-01-01 RX ORDER — VANCOMYCIN HYDROCHLORIDE 1 G/200ML
15 INJECTION, SOLUTION INTRAVENOUS EVERY 12 HOURS
Status: DISCONTINUED | OUTPATIENT
Start: 2020-01-01 | End: 2020-01-01 | Stop reason: DRUGHIGH

## 2020-01-01 RX ORDER — QUETIAPINE FUMARATE 25 MG/1
25 TABLET, FILM COATED ORAL ONCE
Status: COMPLETED | OUTPATIENT
Start: 2020-01-01 | End: 2020-01-01

## 2020-01-01 RX ORDER — CEFTRIAXONE SODIUM 1 G/50ML
1 INJECTION, SOLUTION INTRAVENOUS EVERY 24 HOURS
Status: DISCONTINUED | OUTPATIENT
Start: 2020-01-01 | End: 2020-01-01

## 2020-01-01 RX ORDER — DEXTROSE MONOHYDRATE 50 MG/ML
125 INJECTION, SOLUTION INTRAVENOUS CONTINUOUS
Status: DISCONTINUED | OUTPATIENT
Start: 2020-01-01 | End: 2020-01-01

## 2020-01-01 RX ORDER — NITROGLYCERIN 0.4 MG/1
0.4 TABLET SUBLINGUAL
Status: DISCONTINUED | OUTPATIENT
Start: 2020-01-01 | End: 2020-01-01

## 2020-01-01 RX ORDER — ACETAMINOPHEN 650 MG/1
650 SUPPOSITORY RECTAL EVERY 4 HOURS PRN
Status: DISCONTINUED | OUTPATIENT
Start: 2020-01-01 | End: 2020-01-01 | Stop reason: HOSPADM

## 2020-01-01 RX ORDER — ONDANSETRON 4 MG/1
4 TABLET, FILM COATED ORAL EVERY 6 HOURS
Qty: 12 TABLET | Refills: 0 | Status: SHIPPED | OUTPATIENT
Start: 2020-01-01 | End: 2020-01-01

## 2020-01-01 RX ORDER — POTASSIUM CHLORIDE 1.5 G/1.77G
40 POWDER, FOR SOLUTION ORAL AS NEEDED
Status: DISCONTINUED | OUTPATIENT
Start: 2020-01-01 | End: 2020-01-01

## 2020-01-01 RX ORDER — LORAZEPAM 2 MG/ML
0.5 INJECTION INTRAMUSCULAR
Status: DISCONTINUED | OUTPATIENT
Start: 2020-01-01 | End: 2020-01-01 | Stop reason: HOSPADM

## 2020-01-01 RX ORDER — IPRATROPIUM BROMIDE AND ALBUTEROL SULFATE 2.5; .5 MG/3ML; MG/3ML
3 SOLUTION RESPIRATORY (INHALATION) 2 TIMES DAILY
Status: CANCELLED | OUTPATIENT
Start: 2020-01-01

## 2020-01-01 RX ORDER — FUROSEMIDE 10 MG/ML
40 INJECTION INTRAMUSCULAR; INTRAVENOUS ONCE
Status: DISCONTINUED | OUTPATIENT
Start: 2020-01-01 | End: 2020-01-01

## 2020-01-01 RX ORDER — ONDANSETRON 4 MG/1
4 TABLET, ORALLY DISINTEGRATING ORAL ONCE
Status: DISCONTINUED | OUTPATIENT
Start: 2020-01-01 | End: 2020-01-01 | Stop reason: HOSPADM

## 2020-01-01 RX ORDER — ACETAMINOPHEN 650 MG/1
650 SUPPOSITORY RECTAL ONCE
Status: COMPLETED | OUTPATIENT
Start: 2020-01-01 | End: 2020-01-01

## 2020-01-01 RX ORDER — OLANZAPINE 2.5 MG/1
2.5 TABLET ORAL NIGHTLY
Status: DISCONTINUED | OUTPATIENT
Start: 2020-01-01 | End: 2020-01-01

## 2020-01-01 RX ORDER — MIRTAZAPINE 15 MG/1
15 TABLET, ORALLY DISINTEGRATING ORAL NIGHTLY
Status: CANCELLED | OUTPATIENT
Start: 2020-01-01

## 2020-01-01 RX ORDER — POTASSIUM CHLORIDE 7.45 MG/ML
10 INJECTION INTRAVENOUS
Status: DISCONTINUED | OUTPATIENT
Start: 2020-01-01 | End: 2020-01-01

## 2020-01-01 RX ORDER — ACETAMINOPHEN 325 MG/1
650 TABLET ORAL EVERY 4 HOURS PRN
Status: DISCONTINUED | OUTPATIENT
Start: 2020-01-01 | End: 2020-01-01

## 2020-01-01 RX ORDER — IPRATROPIUM BROMIDE AND ALBUTEROL SULFATE 2.5; .5 MG/3ML; MG/3ML
3 SOLUTION RESPIRATORY (INHALATION) EVERY 4 HOURS PRN
Status: DISCONTINUED | OUTPATIENT
Start: 2020-01-01 | End: 2020-01-01 | Stop reason: HOSPADM

## 2020-01-01 RX ORDER — IPRATROPIUM BROMIDE AND ALBUTEROL SULFATE 2.5; .5 MG/3ML; MG/3ML
3 SOLUTION RESPIRATORY (INHALATION) 2 TIMES DAILY
Status: DISCONTINUED | OUTPATIENT
Start: 2020-01-01 | End: 2020-01-01

## 2020-01-01 RX ORDER — ONDANSETRON 2 MG/ML
4 INJECTION INTRAMUSCULAR; INTRAVENOUS EVERY 6 HOURS PRN
Status: DISCONTINUED | OUTPATIENT
Start: 2020-01-01 | End: 2020-01-01

## 2020-01-01 RX ORDER — LORAZEPAM 2 MG/ML
2 CONCENTRATE ORAL
Status: CANCELLED | OUTPATIENT
Start: 2020-01-01 | End: 2020-05-18

## 2020-01-01 RX ADMIN — IPRATROPIUM BROMIDE AND ALBUTEROL SULFATE 3 ML: 2.5; .5 SOLUTION RESPIRATORY (INHALATION) at 19:11

## 2020-01-01 RX ADMIN — HYDROMORPHONE HYDROCHLORIDE 0.5 MG: 1 INJECTION, SOLUTION INTRAMUSCULAR; INTRAVENOUS; SUBCUTANEOUS at 23:58

## 2020-01-01 RX ADMIN — QUETIAPINE FUMARATE 25 MG: 25 TABLET ORAL at 15:07

## 2020-01-01 RX ADMIN — TAZOBACTAM SODIUM AND PIPERACILLIN SODIUM 3.38 G: 375; 3 INJECTION, SOLUTION INTRAVENOUS at 02:44

## 2020-01-01 RX ADMIN — SODIUM CHLORIDE, PRESERVATIVE FREE 10 ML: 5 INJECTION INTRAVENOUS at 11:24

## 2020-01-01 RX ADMIN — QUETIAPINE FUMARATE 25 MG: 25 TABLET ORAL at 17:08

## 2020-01-01 RX ADMIN — INSULIN LISPRO 7 UNITS: 100 INJECTION, SOLUTION INTRAVENOUS; SUBCUTANEOUS at 16:46

## 2020-01-01 RX ADMIN — ONDANSETRON 4 MG: 2 INJECTION INTRAMUSCULAR; INTRAVENOUS at 14:37

## 2020-01-01 RX ADMIN — FUROSEMIDE 40 MG: 10 INJECTION, SOLUTION INTRAMUSCULAR; INTRAVENOUS at 18:28

## 2020-01-01 RX ADMIN — SODIUM CHLORIDE, PRESERVATIVE FREE 10 ML: 5 INJECTION INTRAVENOUS at 09:05

## 2020-01-01 RX ADMIN — LORAZEPAM 0.5 MG: 2 INJECTION INTRAMUSCULAR; INTRAVENOUS at 20:34

## 2020-01-01 RX ADMIN — IPRATROPIUM BROMIDE AND ALBUTEROL SULFATE 3 ML: 2.5; .5 SOLUTION RESPIRATORY (INHALATION) at 21:26

## 2020-01-01 RX ADMIN — QUETIAPINE FUMARATE 25 MG: 25 TABLET ORAL at 16:52

## 2020-01-01 RX ADMIN — INSULIN HUMAN 4 UNITS: 100 INJECTION, SOLUTION PARENTERAL at 00:36

## 2020-01-01 RX ADMIN — HYDROMORPHONE HYDROCHLORIDE 0.5 MG: 1 INJECTION, SOLUTION INTRAMUSCULAR; INTRAVENOUS; SUBCUTANEOUS at 23:18

## 2020-01-01 RX ADMIN — INSULIN HUMAN 3 UNITS: 100 INJECTION, SOLUTION PARENTERAL at 00:02

## 2020-01-01 RX ADMIN — SODIUM CHLORIDE SOLN NEBU 7% 4 ML: 7 NEBU SOLN at 09:08

## 2020-01-01 RX ADMIN — MINERAL SUPPLEMENT IRON 300 MG / 5 ML STRENGTH LIQUID 100 PER BOX UNFLAVORED 300 MG: at 09:52

## 2020-01-01 RX ADMIN — HYDROMORPHONE HYDROCHLORIDE 0.5 MG: 1 INJECTION, SOLUTION INTRAMUSCULAR; INTRAVENOUS; SUBCUTANEOUS at 12:09

## 2020-01-01 RX ADMIN — LORAZEPAM 0.5 MG: 2 INJECTION INTRAMUSCULAR; INTRAVENOUS at 00:37

## 2020-01-01 RX ADMIN — IPRATROPIUM BROMIDE AND ALBUTEROL SULFATE 3 ML: 2.5; .5 SOLUTION RESPIRATORY (INHALATION) at 08:19

## 2020-01-01 RX ADMIN — GLYCOPYRROLATE 0.4 MG: 0.2 INJECTION, SOLUTION INTRAMUSCULAR; INTRAVITREAL at 03:34

## 2020-01-01 RX ADMIN — HALOPERIDOL LACTATE 1 MG: 5 INJECTION INTRAMUSCULAR at 13:57

## 2020-01-01 RX ADMIN — HYDROMORPHONE HYDROCHLORIDE 0.5 MG: 1 INJECTION, SOLUTION INTRAMUSCULAR; INTRAVENOUS; SUBCUTANEOUS at 05:47

## 2020-01-01 RX ADMIN — VANCOMYCIN HYDROCHLORIDE 1000 MG: 1 INJECTION, SOLUTION INTRAVENOUS at 11:06

## 2020-01-01 RX ADMIN — INSULIN HUMAN 4 UNITS: 100 INJECTION, SOLUTION PARENTERAL at 00:29

## 2020-01-01 RX ADMIN — GLYCOPYRROLATE 0.4 MG: 0.2 INJECTION, SOLUTION INTRAMUSCULAR; INTRAVITREAL at 15:34

## 2020-01-01 RX ADMIN — HALOPERIDOL LACTATE 1 MG: 5 INJECTION INTRAMUSCULAR at 05:58

## 2020-01-01 RX ADMIN — QUETIAPINE FUMARATE 25 MG: 25 TABLET ORAL at 10:53

## 2020-01-01 RX ADMIN — TAZOBACTAM SODIUM AND PIPERACILLIN SODIUM 3.38 G: 375; 3 INJECTION, SOLUTION INTRAVENOUS at 17:20

## 2020-01-01 RX ADMIN — HYDROMORPHONE HYDROCHLORIDE 0.5 MG: 1 INJECTION, SOLUTION INTRAMUSCULAR; INTRAVENOUS; SUBCUTANEOUS at 05:41

## 2020-01-01 RX ADMIN — POTASSIUM CHLORIDE 40 MEQ: 10 CAPSULE, COATED, EXTENDED RELEASE ORAL at 16:49

## 2020-01-01 RX ADMIN — HYDROMORPHONE HYDROCHLORIDE 0.5 MG: 1 INJECTION, SOLUTION INTRAMUSCULAR; INTRAVENOUS; SUBCUTANEOUS at 03:34

## 2020-01-01 RX ADMIN — IOPAMIDOL 85 ML: 612 INJECTION, SOLUTION INTRAVENOUS at 22:22

## 2020-01-01 RX ADMIN — VANCOMYCIN HYDROCHLORIDE 1000 MG: 1 INJECTION, SOLUTION INTRAVENOUS at 22:34

## 2020-01-01 RX ADMIN — TAZOBACTAM SODIUM AND PIPERACILLIN SODIUM 3.38 G: 375; 3 INJECTION, SOLUTION INTRAVENOUS at 11:16

## 2020-01-01 RX ADMIN — QUETIAPINE FUMARATE 25 MG: 25 TABLET ORAL at 21:55

## 2020-01-01 RX ADMIN — HYDROMORPHONE HYDROCHLORIDE 0.5 MG: 1 INJECTION, SOLUTION INTRAMUSCULAR; INTRAVENOUS; SUBCUTANEOUS at 20:35

## 2020-01-01 RX ADMIN — HYDROMORPHONE HYDROCHLORIDE 0.5 MG: 1 INJECTION, SOLUTION INTRAMUSCULAR; INTRAVENOUS; SUBCUTANEOUS at 15:34

## 2020-01-01 RX ADMIN — CEFTRIAXONE SODIUM 1 G: 1 INJECTION, SOLUTION INTRAVENOUS at 17:30

## 2020-01-01 RX ADMIN — TAZOBACTAM SODIUM AND PIPERACILLIN SODIUM 3.38 G: 375; 3 INJECTION, SOLUTION INTRAVENOUS at 18:22

## 2020-01-01 RX ADMIN — INFLUENZA A VIRUS A/BRISBANE/02/2018 IVR-190 (H1N1) ANTIGEN (PROPIOLACTONE INACTIVATED), INFLUENZA A VIRUS A/KANSAS/14/2017 X-327 (H3N2) ANTIGEN (PROPIOLACTONE INACTIVATED), INFLUENZA B VIRUS B/MARYLAND/15/2016 ANTIGEN (PROPIOLACTONE INACTIVATED), INFLUENZA B VIRUS B/PHUKET/3073/2013 BVR-1B ANTIGEN (PROPIOLACTONE INACTIVATED) 0.5 ML: 15; 15; 15; 15 INJECTION, SUSPENSION INTRAMUSCULAR at 14:48

## 2020-01-01 RX ADMIN — LANSOPRAZOLE 30 MG: KIT at 17:57

## 2020-01-01 RX ADMIN — IPRATROPIUM BROMIDE AND ALBUTEROL SULFATE 3 ML: 2.5; .5 SOLUTION RESPIRATORY (INHALATION) at 07:21

## 2020-01-01 RX ADMIN — ACETAMINOPHEN 650 MG: 325 TABLET, FILM COATED ORAL at 09:51

## 2020-01-01 RX ADMIN — INSULIN HUMAN 2 UNITS: 100 INJECTION, SOLUTION PARENTERAL at 06:50

## 2020-01-01 RX ADMIN — INSULIN HUMAN 3 UNITS: 100 INJECTION, SOLUTION PARENTERAL at 17:07

## 2020-01-01 RX ADMIN — SODIUM CHLORIDE, PRESERVATIVE FREE 10 ML: 5 INJECTION INTRAVENOUS at 09:53

## 2020-01-01 RX ADMIN — MINERAL SUPPLEMENT IRON 300 MG / 5 ML STRENGTH LIQUID 100 PER BOX UNFLAVORED 300 MG: at 08:02

## 2020-01-01 RX ADMIN — GLYCOPYRROLATE 0.4 MG: 0.2 INJECTION, SOLUTION INTRAMUSCULAR; INTRAVITREAL at 01:16

## 2020-01-01 RX ADMIN — IPRATROPIUM BROMIDE AND ALBUTEROL SULFATE 3 ML: 2.5; .5 SOLUTION RESPIRATORY (INHALATION) at 09:57

## 2020-01-01 RX ADMIN — MORPHINE SULFATE 4 MG: 2 INJECTION, SOLUTION INTRAMUSCULAR; INTRAVENOUS at 20:25

## 2020-01-01 RX ADMIN — QUETIAPINE FUMARATE 25 MG: 25 TABLET ORAL at 20:48

## 2020-01-01 RX ADMIN — SODIUM CHLORIDE SOLN NEBU 7% 4 ML: 7 NEBU SOLN at 08:08

## 2020-01-01 RX ADMIN — HALOPERIDOL LACTATE 2 MG: 5 INJECTION, SOLUTION INTRAMUSCULAR at 20:22

## 2020-01-01 RX ADMIN — SODIUM CHLORIDE SOLN NEBU 7% 4 ML: 7 NEBU SOLN at 07:27

## 2020-01-01 RX ADMIN — TAZOBACTAM SODIUM AND PIPERACILLIN SODIUM 3.38 G: 375; 3 INJECTION, SOLUTION INTRAVENOUS at 02:56

## 2020-01-01 RX ADMIN — SODIUM CHLORIDE, PRESERVATIVE FREE 10 ML: 5 INJECTION INTRAVENOUS at 20:08

## 2020-01-01 RX ADMIN — IPRATROPIUM BROMIDE AND ALBUTEROL SULFATE 3 ML: 2.5; .5 SOLUTION RESPIRATORY (INHALATION) at 19:09

## 2020-01-01 RX ADMIN — DOXYCYCLINE 100 MG: 100 CAPSULE ORAL at 20:23

## 2020-01-01 RX ADMIN — SODIUM CHLORIDE, PRESERVATIVE FREE 10 ML: 5 INJECTION INTRAVENOUS at 20:00

## 2020-01-01 RX ADMIN — HALOPERIDOL LACTATE 1 MG: 5 INJECTION, SOLUTION INTRAMUSCULAR at 01:17

## 2020-01-01 RX ADMIN — QUETIAPINE FUMARATE 25 MG: 25 TABLET ORAL at 01:31

## 2020-01-01 RX ADMIN — DEXTROSE MONOHYDRATE 250 ML/HR: 50 INJECTION, SOLUTION INTRAVENOUS at 22:15

## 2020-01-01 RX ADMIN — TAZOBACTAM SODIUM AND PIPERACILLIN SODIUM 3.38 G: 375; 3 INJECTION, SOLUTION INTRAVENOUS at 18:25

## 2020-01-01 RX ADMIN — FUROSEMIDE 40 MG: 10 INJECTION, SOLUTION INTRAMUSCULAR; INTRAVENOUS at 09:05

## 2020-01-01 RX ADMIN — DOXYCYCLINE 100 MG: 100 CAPSULE ORAL at 11:41

## 2020-01-01 RX ADMIN — SODIUM PHOSPHATE, MONOBASIC, MONOHYDRATE 10 MMOL: 276; 142 INJECTION, SOLUTION INTRAVENOUS at 15:01

## 2020-01-01 RX ADMIN — LANSOPRAZOLE 30 MG: KIT at 19:46

## 2020-01-01 RX ADMIN — IPRATROPIUM BROMIDE AND ALBUTEROL SULFATE 3 ML: 2.5; .5 SOLUTION RESPIRATORY (INHALATION) at 08:07

## 2020-01-01 RX ADMIN — TAZOBACTAM SODIUM AND PIPERACILLIN SODIUM 3.38 G: 375; 3 INJECTION, SOLUTION INTRAVENOUS at 11:23

## 2020-01-01 RX ADMIN — TAZOBACTAM SODIUM AND PIPERACILLIN SODIUM 3.38 G: 375; 3 INJECTION, SOLUTION INTRAVENOUS at 10:08

## 2020-01-01 RX ADMIN — INSULIN HUMAN 3 UNITS: 100 INJECTION, SOLUTION PARENTERAL at 06:19

## 2020-01-01 RX ADMIN — GLYCOPYRROLATE 0.4 MG: 0.2 INJECTION, SOLUTION INTRAMUSCULAR; INTRAVITREAL at 05:46

## 2020-01-01 RX ADMIN — IPRATROPIUM BROMIDE AND ALBUTEROL SULFATE 3 ML: 2.5; .5 SOLUTION RESPIRATORY (INHALATION) at 07:25

## 2020-01-01 RX ADMIN — IRON SUCROSE 200 MG: 20 INJECTION, SOLUTION INTRAVENOUS at 14:22

## 2020-01-01 RX ADMIN — LIDOCAINE HYDROCHLORIDE 40 MG: 20 INJECTION, SOLUTION INFILTRATION; PERINEURAL at 11:03

## 2020-01-01 RX ADMIN — GLYCOPYRROLATE 0.4 MG: 0.2 INJECTION, SOLUTION INTRAMUSCULAR; INTRAVITREAL at 20:35

## 2020-01-01 RX ADMIN — SODIUM CHLORIDE 1000 ML: 9 INJECTION, SOLUTION INTRAVENOUS at 11:03

## 2020-01-01 RX ADMIN — GLYCOPYRROLATE 0.4 MG: 0.2 INJECTION, SOLUTION INTRAMUSCULAR; INTRAVITREAL at 09:10

## 2020-01-01 RX ADMIN — LANSOPRAZOLE 30 MG: KIT at 08:49

## 2020-01-01 RX ADMIN — MINERAL SUPPLEMENT IRON 300 MG / 5 ML STRENGTH LIQUID 100 PER BOX UNFLAVORED 300 MG: at 11:43

## 2020-01-01 RX ADMIN — SODIUM CHLORIDE SOLN NEBU 7% 4 ML: 7 NEBU SOLN at 08:34

## 2020-01-01 RX ADMIN — LORAZEPAM 0.5 MG: 2 INJECTION INTRAMUSCULAR; INTRAVENOUS at 16:34

## 2020-01-01 RX ADMIN — POTASSIUM CHLORIDE 20 MEQ: 1.5 POWDER, FOR SOLUTION ORAL at 21:52

## 2020-01-01 RX ADMIN — POTASSIUM CHLORIDE 40 MEQ: 10 CAPSULE, COATED, EXTENDED RELEASE ORAL at 14:37

## 2020-01-01 RX ADMIN — HYDROMORPHONE HYDROCHLORIDE 0.5 MG: 1 INJECTION, SOLUTION INTRAMUSCULAR; INTRAVENOUS; SUBCUTANEOUS at 09:10

## 2020-01-01 RX ADMIN — SODIUM CHLORIDE 500 ML: 9 INJECTION, SOLUTION INTRAVENOUS at 21:30

## 2020-01-01 RX ADMIN — MINERAL SUPPLEMENT IRON 300 MG / 5 ML STRENGTH LIQUID 100 PER BOX UNFLAVORED 300 MG: at 09:20

## 2020-01-01 RX ADMIN — TAZOBACTAM SODIUM AND PIPERACILLIN SODIUM 3.38 G: 375; 3 INJECTION, SOLUTION INTRAVENOUS at 19:35

## 2020-01-01 RX ADMIN — SODIUM CHLORIDE, POTASSIUM CHLORIDE, SODIUM LACTATE AND CALCIUM CHLORIDE 30 ML/HR: 600; 310; 30; 20 INJECTION, SOLUTION INTRAVENOUS at 10:17

## 2020-01-01 RX ADMIN — INSULIN HUMAN 3 UNITS: 100 INJECTION, SOLUTION PARENTERAL at 11:15

## 2020-01-01 RX ADMIN — INSULIN HUMAN 2 UNITS: 100 INJECTION, SOLUTION PARENTERAL at 06:30

## 2020-01-01 RX ADMIN — LANSOPRAZOLE 30 MG: KIT at 21:05

## 2020-01-01 RX ADMIN — DEXTROSE MONOHYDRATE 125 ML/HR: 50 INJECTION, SOLUTION INTRAVENOUS at 07:05

## 2020-01-01 RX ADMIN — IPRATROPIUM BROMIDE AND ALBUTEROL SULFATE 3 ML: 2.5; .5 SOLUTION RESPIRATORY (INHALATION) at 07:34

## 2020-01-01 RX ADMIN — IOPAMIDOL 85 ML: 612 INJECTION, SOLUTION INTRAVENOUS at 10:57

## 2020-01-01 RX ADMIN — HYDROMORPHONE HYDROCHLORIDE 0.5 MG: 1 INJECTION, SOLUTION INTRAMUSCULAR; INTRAVENOUS; SUBCUTANEOUS at 04:41

## 2020-01-01 RX ADMIN — SODIUM CHLORIDE, PRESERVATIVE FREE 10 ML: 5 INJECTION INTRAVENOUS at 09:21

## 2020-01-01 RX ADMIN — FUROSEMIDE 40 MG: 10 INJECTION, SOLUTION INTRAMUSCULAR; INTRAVENOUS at 18:25

## 2020-01-01 RX ADMIN — DEXTROSE MONOHYDRATE 150 ML/HR: 50 INJECTION, SOLUTION INTRAVENOUS at 17:58

## 2020-01-01 RX ADMIN — QUETIAPINE FUMARATE 25 MG: 25 TABLET ORAL at 10:46

## 2020-01-01 RX ADMIN — ACETAMINOPHEN 650 MG: 325 TABLET, FILM COATED ORAL at 17:29

## 2020-01-01 RX ADMIN — GLYCERIN 7.5 ML: 5.4 LIQUID RECTAL at 11:03

## 2020-01-01 RX ADMIN — LORAZEPAM 0.5 MG: 2 INJECTION INTRAMUSCULAR; INTRAVENOUS at 01:16

## 2020-01-01 RX ADMIN — SODIUM CHLORIDE, PRESERVATIVE FREE 10 ML: 5 INJECTION INTRAVENOUS at 13:15

## 2020-01-01 RX ADMIN — FUROSEMIDE 40 MG: 10 INJECTION, SOLUTION INTRAMUSCULAR; INTRAVENOUS at 17:08

## 2020-01-01 RX ADMIN — IPRATROPIUM BROMIDE AND ALBUTEROL SULFATE 3 ML: 2.5; .5 SOLUTION RESPIRATORY (INHALATION) at 19:53

## 2020-01-01 RX ADMIN — FAMOTIDINE 20 MG: 20 TABLET, FILM COATED ORAL at 06:21

## 2020-01-01 RX ADMIN — DOXYCYCLINE 100 MG: 100 CAPSULE ORAL at 21:20

## 2020-01-01 RX ADMIN — POTASSIUM CHLORIDE 20 MEQ: 1.5 POWDER, FOR SOLUTION ORAL at 08:49

## 2020-01-01 RX ADMIN — SODIUM CHLORIDE SOLN NEBU 7% 4 ML: 7 NEBU SOLN at 20:20

## 2020-01-01 RX ADMIN — QUETIAPINE FUMARATE 25 MG: 25 TABLET ORAL at 03:26

## 2020-01-01 RX ADMIN — INSULIN HUMAN 2 UNITS: 100 INJECTION, SOLUTION PARENTERAL at 17:28

## 2020-01-01 RX ADMIN — DEXTROSE MONOHYDRATE 50 ML/HR: 50 INJECTION, SOLUTION INTRAVENOUS at 12:14

## 2020-01-01 RX ADMIN — SODIUM CHLORIDE 750 MG: 900 INJECTION, SOLUTION INTRAVENOUS at 07:29

## 2020-01-01 RX ADMIN — GLYCOPYRROLATE 0.4 MG: 0.2 INJECTION, SOLUTION INTRAMUSCULAR; INTRAVITREAL at 12:09

## 2020-01-01 RX ADMIN — GLYCOPYRROLATE 0.4 MG: 0.2 INJECTION, SOLUTION INTRAMUSCULAR; INTRAVITREAL at 12:45

## 2020-01-01 RX ADMIN — IPRATROPIUM BROMIDE AND ALBUTEROL SULFATE 3 ML: 2.5; .5 SOLUTION RESPIRATORY (INHALATION) at 20:17

## 2020-01-01 RX ADMIN — LORAZEPAM 0.5 MG: 2 INJECTION INTRAMUSCULAR; INTRAVENOUS at 12:09

## 2020-01-01 RX ADMIN — SODIUM CHLORIDE, PRESERVATIVE FREE 10 ML: 5 INJECTION INTRAVENOUS at 20:07

## 2020-01-01 RX ADMIN — SODIUM CHLORIDE, POTASSIUM CHLORIDE, SODIUM LACTATE AND CALCIUM CHLORIDE 1000 ML: 600; 310; 30; 20 INJECTION, SOLUTION INTRAVENOUS at 20:06

## 2020-01-01 RX ADMIN — MIRTAZAPINE 15 MG: 15 TABLET, ORALLY DISINTEGRATING ORAL at 20:34

## 2020-01-01 RX ADMIN — POTASSIUM PHOSPHATE, MONOBASIC AND POTASSIUM PHOSPHATE, DIBASIC 45 MMOL: 224; 236 INJECTION, SOLUTION INTRAVENOUS at 10:41

## 2020-01-01 RX ADMIN — HALOPERIDOL LACTATE 1 MG: 5 INJECTION INTRAMUSCULAR at 17:27

## 2020-01-01 RX ADMIN — TAZOBACTAM SODIUM AND PIPERACILLIN SODIUM 3.38 G: 375; 3 INJECTION, SOLUTION INTRAVENOUS at 03:01

## 2020-01-01 RX ADMIN — SODIUM CHLORIDE, PRESERVATIVE FREE 10 ML: 5 INJECTION INTRAVENOUS at 21:04

## 2020-01-01 RX ADMIN — IPRATROPIUM BROMIDE AND ALBUTEROL SULFATE 3 ML: 2.5; .5 SOLUTION RESPIRATORY (INHALATION) at 07:07

## 2020-01-01 RX ADMIN — POTASSIUM CHLORIDE 40 MEQ: 1.5 POWDER, FOR SOLUTION ORAL at 14:07

## 2020-01-01 RX ADMIN — IPRATROPIUM BROMIDE AND ALBUTEROL SULFATE 3 ML: 2.5; .5 SOLUTION RESPIRATORY (INHALATION) at 19:16

## 2020-01-01 RX ADMIN — TAZOBACTAM SODIUM AND PIPERACILLIN SODIUM 3.38 G: 375; 3 INJECTION, SOLUTION INTRAVENOUS at 11:07

## 2020-01-01 RX ADMIN — MINERAL SUPPLEMENT IRON 300 MG / 5 ML STRENGTH LIQUID 100 PER BOX UNFLAVORED 300 MG: at 14:14

## 2020-01-01 RX ADMIN — IPRATROPIUM BROMIDE AND ALBUTEROL SULFATE 3 ML: 2.5; .5 SOLUTION RESPIRATORY (INHALATION) at 08:34

## 2020-01-01 RX ADMIN — GLYCOPYRROLATE 0.4 MG: 0.2 INJECTION, SOLUTION INTRAMUSCULAR; INTRAVITREAL at 20:49

## 2020-01-01 RX ADMIN — DEXTROSE MONOHYDRATE 150 ML/HR: 50 INJECTION, SOLUTION INTRAVENOUS at 11:23

## 2020-01-01 RX ADMIN — HYDROMORPHONE HYDROCHLORIDE 0.5 MG: 1 INJECTION, SOLUTION INTRAMUSCULAR; INTRAVENOUS; SUBCUTANEOUS at 04:44

## 2020-01-01 RX ADMIN — IPRATROPIUM BROMIDE AND ALBUTEROL SULFATE 3 ML: 2.5; .5 SOLUTION RESPIRATORY (INHALATION) at 07:23

## 2020-01-01 RX ADMIN — POTASSIUM CHLORIDE 40 MEQ: 1.5 POWDER, FOR SOLUTION ORAL at 16:13

## 2020-01-01 RX ADMIN — HALOPERIDOL LACTATE 1 MG: 5 INJECTION INTRAMUSCULAR at 00:25

## 2020-01-01 RX ADMIN — QUETIAPINE FUMARATE 25 MG: 25 TABLET ORAL at 03:08

## 2020-01-01 RX ADMIN — SODIUM CHLORIDE SOLN NEBU 7% 4 ML: 7 NEBU SOLN at 20:08

## 2020-01-01 RX ADMIN — IPRATROPIUM BROMIDE AND ALBUTEROL SULFATE 3 ML: 2.5; .5 SOLUTION RESPIRATORY (INHALATION) at 19:13

## 2020-01-01 RX ADMIN — TAZOBACTAM SODIUM AND PIPERACILLIN SODIUM 3.38 G: 375; 3 INJECTION, SOLUTION INTRAVENOUS at 17:27

## 2020-01-01 RX ADMIN — SODIUM CHLORIDE, PRESERVATIVE FREE 10 ML: 5 INJECTION INTRAVENOUS at 19:53

## 2020-01-01 RX ADMIN — LIDOCAINE 1 PATCH: 50 PATCH TOPICAL at 13:54

## 2020-01-01 RX ADMIN — IPRATROPIUM BROMIDE AND ALBUTEROL SULFATE 3 ML: 2.5; .5 SOLUTION RESPIRATORY (INHALATION) at 19:33

## 2020-01-01 RX ADMIN — FAMOTIDINE 20 MG: 20 TABLET, FILM COATED ORAL at 08:23

## 2020-01-01 RX ADMIN — VANCOMYCIN HYDROCHLORIDE 1000 MG: 1 INJECTION, SOLUTION INTRAVENOUS at 21:01

## 2020-01-01 RX ADMIN — TAZOBACTAM SODIUM AND PIPERACILLIN SODIUM 3.38 G: 375; 3 INJECTION, SOLUTION INTRAVENOUS at 11:53

## 2020-01-01 RX ADMIN — LANSOPRAZOLE 30 MG: KIT at 22:56

## 2020-01-01 RX ADMIN — VANCOMYCIN HYDROCHLORIDE 1000 MG: 1 INJECTION, SOLUTION INTRAVENOUS at 11:53

## 2020-01-01 RX ADMIN — INSULIN HUMAN 3 UNITS: 100 INJECTION, SOLUTION PARENTERAL at 06:18

## 2020-01-01 RX ADMIN — INSULIN HUMAN 4 UNITS: 100 INJECTION, SOLUTION PARENTERAL at 01:11

## 2020-01-01 RX ADMIN — PROPOFOL 50 MG: 10 INJECTION, EMULSION INTRAVENOUS at 11:06

## 2020-01-01 RX ADMIN — LORAZEPAM 0.5 MG: 2 INJECTION INTRAMUSCULAR; INTRAVENOUS at 20:35

## 2020-01-01 RX ADMIN — PROPOFOL 50 MG: 10 INJECTION, EMULSION INTRAVENOUS at 11:03

## 2020-01-01 RX ADMIN — HYDROMORPHONE HYDROCHLORIDE 0.5 MG: 1 INJECTION, SOLUTION INTRAMUSCULAR; INTRAVENOUS; SUBCUTANEOUS at 16:34

## 2020-01-01 RX ADMIN — INSULIN HUMAN 2 UNITS: 100 INJECTION, SOLUTION PARENTERAL at 00:23

## 2020-01-01 RX ADMIN — FAMOTIDINE 20 MG: 20 TABLET, FILM COATED ORAL at 19:53

## 2020-01-01 RX ADMIN — HYDROMORPHONE HYDROCHLORIDE 0.5 MG: 1 INJECTION, SOLUTION INTRAMUSCULAR; INTRAVENOUS; SUBCUTANEOUS at 01:16

## 2020-01-01 RX ADMIN — FUROSEMIDE 40 MG: 10 INJECTION, SOLUTION INTRAMUSCULAR; INTRAVENOUS at 11:44

## 2020-01-01 RX ADMIN — HYDROMORPHONE HYDROCHLORIDE 0.5 MG: 1 INJECTION, SOLUTION INTRAMUSCULAR; INTRAVENOUS; SUBCUTANEOUS at 20:49

## 2020-01-01 RX ADMIN — MINERAL SUPPLEMENT IRON 300 MG / 5 ML STRENGTH LIQUID 100 PER BOX UNFLAVORED 300 MG: at 08:16

## 2020-01-01 RX ADMIN — IPRATROPIUM BROMIDE AND ALBUTEROL SULFATE 3 ML: 2.5; .5 SOLUTION RESPIRATORY (INHALATION) at 07:27

## 2020-01-01 RX ADMIN — VANCOMYCIN HYDROCHLORIDE 1000 MG: 1 INJECTION, SOLUTION INTRAVENOUS at 23:03

## 2020-01-01 RX ADMIN — SODIUM CHLORIDE, PRESERVATIVE FREE 10 ML: 5 INJECTION INTRAVENOUS at 21:01

## 2020-01-01 RX ADMIN — OLANZAPINE 2.5 MG: 2.5 TABLET, FILM COATED ORAL at 21:20

## 2020-01-01 RX ADMIN — IPRATROPIUM BROMIDE AND ALBUTEROL SULFATE 3 ML: 2.5; .5 SOLUTION RESPIRATORY (INHALATION) at 19:24

## 2020-01-01 RX ADMIN — DEXTROSE MONOHYDRATE 250 ML/HR: 50 INJECTION, SOLUTION INTRAVENOUS at 13:47

## 2020-01-01 RX ADMIN — FUROSEMIDE 40 MG: 10 INJECTION, SOLUTION INTRAMUSCULAR; INTRAVENOUS at 08:02

## 2020-01-01 RX ADMIN — SODIUM CHLORIDE SOLN NEBU 7% 4 ML: 7 NEBU SOLN at 07:26

## 2020-01-01 RX ADMIN — LIDOCAINE HYDROCHLORIDE: 20 JELLY TOPICAL at 09:40

## 2020-01-01 RX ADMIN — TAZOBACTAM SODIUM AND PIPERACILLIN SODIUM 3.38 G: 375; 3 INJECTION, SOLUTION INTRAVENOUS at 11:06

## 2020-01-01 RX ADMIN — VANCOMYCIN HYDROCHLORIDE 1000 MG: 1 INJECTION, SOLUTION INTRAVENOUS at 22:03

## 2020-01-01 RX ADMIN — ACETAMINOPHEN 650 MG: 325 TABLET, FILM COATED ORAL at 14:07

## 2020-01-01 RX ADMIN — POTASSIUM PHOSPHATE, MONOBASIC AND POTASSIUM PHOSPHATE, DIBASIC 15 MMOL: 224; 236 INJECTION, SOLUTION INTRAVENOUS at 10:02

## 2020-01-01 RX ADMIN — FUROSEMIDE 40 MG: 10 INJECTION, SOLUTION INTRAMUSCULAR; INTRAVENOUS at 08:16

## 2020-01-01 RX ADMIN — IRON SUCROSE 200 MG: 20 INJECTION, SOLUTION INTRAVENOUS at 14:17

## 2020-01-01 RX ADMIN — INSULIN HUMAN 4 UNITS: 100 INJECTION, SOLUTION PARENTERAL at 11:53

## 2020-01-01 RX ADMIN — IPRATROPIUM BROMIDE AND ALBUTEROL SULFATE 3 ML: 2.5; .5 SOLUTION RESPIRATORY (INHALATION) at 19:27

## 2020-01-01 RX ADMIN — SODIUM CHLORIDE 125 ML/HR: 9 INJECTION, SOLUTION INTRAVENOUS at 16:52

## 2020-01-01 RX ADMIN — TAZOBACTAM SODIUM AND PIPERACILLIN SODIUM 3.38 G: 375; 3 INJECTION, SOLUTION INTRAVENOUS at 04:42

## 2020-01-01 RX ADMIN — GLYCOPYRROLATE 0.4 MG: 0.2 INJECTION, SOLUTION INTRAMUSCULAR; INTRAVITREAL at 04:42

## 2020-01-01 RX ADMIN — IPRATROPIUM BROMIDE AND ALBUTEROL SULFATE 3 ML: 2.5; .5 SOLUTION RESPIRATORY (INHALATION) at 20:11

## 2020-01-01 RX ADMIN — SODIUM CHLORIDE SOLN NEBU 7% 4 ML: 7 NEBU SOLN at 08:19

## 2020-01-01 RX ADMIN — SODIUM CHLORIDE, PRESERVATIVE FREE 10 ML: 5 INJECTION INTRAVENOUS at 23:03

## 2020-01-01 RX ADMIN — MINERAL SUPPLEMENT IRON 300 MG / 5 ML STRENGTH LIQUID 100 PER BOX UNFLAVORED 300 MG: at 08:43

## 2020-01-01 RX ADMIN — DOXYCYCLINE 100 MG: 100 CAPSULE ORAL at 17:29

## 2020-01-01 RX ADMIN — POTASSIUM CHLORIDE 40 MEQ: 1.5 POWDER, FOR SOLUTION ORAL at 09:51

## 2020-01-01 RX ADMIN — IPRATROPIUM BROMIDE AND ALBUTEROL SULFATE 3 ML: 2.5; .5 SOLUTION RESPIRATORY (INHALATION) at 19:06

## 2020-01-01 RX ADMIN — IPRATROPIUM BROMIDE AND ALBUTEROL SULFATE 3 ML: 2.5; .5 SOLUTION RESPIRATORY (INHALATION) at 11:35

## 2020-01-01 RX ADMIN — MINERAL SUPPLEMENT IRON 300 MG / 5 ML STRENGTH LIQUID 100 PER BOX UNFLAVORED 300 MG: at 11:06

## 2020-01-01 RX ADMIN — VANCOMYCIN HYDROCHLORIDE 1000 MG: 1 INJECTION, SOLUTION INTRAVENOUS at 21:48

## 2020-01-01 RX ADMIN — INSULIN HUMAN 2 UNITS: 100 INJECTION, SOLUTION PARENTERAL at 05:47

## 2020-01-01 RX ADMIN — SODIUM CHLORIDE 750 MG: 900 INJECTION, SOLUTION INTRAVENOUS at 16:47

## 2020-01-01 RX ADMIN — SODIUM CHLORIDE, PRESERVATIVE FREE 10 ML: 5 INJECTION INTRAVENOUS at 08:43

## 2020-01-01 RX ADMIN — LORAZEPAM 0.5 MG: 2 INJECTION INTRAMUSCULAR; INTRAVENOUS at 04:42

## 2020-01-01 RX ADMIN — QUETIAPINE FUMARATE 25 MG: 25 TABLET ORAL at 19:57

## 2020-01-01 RX ADMIN — LANSOPRAZOLE 30 MG: KIT at 08:30

## 2020-01-01 RX ADMIN — DOXYCYCLINE 100 MG: 100 CAPSULE ORAL at 20:08

## 2020-01-01 RX ADMIN — LORAZEPAM 0.5 MG: 2 INJECTION INTRAMUSCULAR; INTRAVENOUS at 08:05

## 2020-01-01 RX ADMIN — FUROSEMIDE 40 MG: 10 INJECTION, SOLUTION INTRAMUSCULAR; INTRAVENOUS at 08:00

## 2020-01-01 RX ADMIN — SODIUM CHLORIDE, POTASSIUM CHLORIDE, SODIUM LACTATE AND CALCIUM CHLORIDE: 600; 310; 30; 20 INJECTION, SOLUTION INTRAVENOUS at 10:52

## 2020-01-01 RX ADMIN — FUROSEMIDE 40 MG: 10 INJECTION, SOLUTION INTRAMUSCULAR; INTRAVENOUS at 17:27

## 2020-01-01 RX ADMIN — FAMOTIDINE 20 MG: 20 TABLET, FILM COATED ORAL at 17:10

## 2020-01-01 RX ADMIN — DEXTROSE MONOHYDRATE 125 ML/HR: 50 INJECTION, SOLUTION INTRAVENOUS at 09:00

## 2020-01-01 RX ADMIN — QUETIAPINE FUMARATE 25 MG: 25 TABLET ORAL at 13:14

## 2020-01-01 RX ADMIN — INSULIN HUMAN 2 UNITS: 100 INJECTION, SOLUTION PARENTERAL at 11:44

## 2020-01-01 RX ADMIN — LANSOPRAZOLE 30 MG: KIT at 17:06

## 2020-01-01 RX ADMIN — IPRATROPIUM BROMIDE AND ALBUTEROL SULFATE 3 ML: 2.5; .5 SOLUTION RESPIRATORY (INHALATION) at 08:06

## 2020-01-01 RX ADMIN — SODIUM CHLORIDE, PRESERVATIVE FREE 10 ML: 5 INJECTION INTRAVENOUS at 20:35

## 2020-01-01 RX ADMIN — POTASSIUM CHLORIDE 40 MEQ: 1.5 POWDER, FOR SOLUTION ORAL at 11:06

## 2020-01-01 RX ADMIN — VANCOMYCIN HYDROCHLORIDE 1000 MG: 1 INJECTION, SOLUTION INTRAVENOUS at 14:44

## 2020-01-01 RX ADMIN — VANCOMYCIN HYDROCHLORIDE 1000 MG: 1 INJECTION, SOLUTION INTRAVENOUS at 11:45

## 2020-01-01 RX ADMIN — MINERAL SUPPLEMENT IRON 300 MG / 5 ML STRENGTH LIQUID 100 PER BOX UNFLAVORED 300 MG: at 09:05

## 2020-01-01 RX ADMIN — SODIUM CHLORIDE SOLN NEBU 7% 4 ML: 7 NEBU SOLN at 19:27

## 2020-01-01 RX ADMIN — SODIUM CHLORIDE, PRESERVATIVE FREE 10 ML: 5 INJECTION INTRAVENOUS at 10:08

## 2020-01-01 RX ADMIN — QUETIAPINE FUMARATE 25 MG: 25 TABLET ORAL at 16:05

## 2020-01-01 RX ADMIN — SODIUM CHLORIDE, PRESERVATIVE FREE 10 ML: 5 INJECTION INTRAVENOUS at 11:45

## 2020-01-01 RX ADMIN — LORAZEPAM 0.5 MG: 2 INJECTION INTRAMUSCULAR; INTRAVENOUS at 15:46

## 2020-01-01 RX ADMIN — INSULIN HUMAN 3 UNITS: 100 INJECTION, SOLUTION PARENTERAL at 17:20

## 2020-01-01 RX ADMIN — DOXYCYCLINE 100 MG: 100 CAPSULE ORAL at 09:03

## 2020-01-01 RX ADMIN — TAZOBACTAM SODIUM AND PIPERACILLIN SODIUM 3.38 G: 375; 3 INJECTION, SOLUTION INTRAVENOUS at 18:16

## 2020-01-01 RX ADMIN — TAZOBACTAM SODIUM AND PIPERACILLIN SODIUM 3.38 G: 375; 3 INJECTION, SOLUTION INTRAVENOUS at 11:21

## 2020-01-01 RX ADMIN — GLYCOPYRROLATE 0.4 MG: 0.2 INJECTION, SOLUTION INTRAMUSCULAR; INTRAVITREAL at 23:17

## 2020-01-01 RX ADMIN — SODIUM CHLORIDE, PRESERVATIVE FREE 10 ML: 5 INJECTION INTRAVENOUS at 08:00

## 2020-01-01 RX ADMIN — LANSOPRAZOLE 30 MG: KIT at 10:46

## 2020-01-01 RX ADMIN — GLYCOPYRROLATE 0.4 MG: 0.2 INJECTION, SOLUTION INTRAMUSCULAR; INTRAVITREAL at 16:34

## 2020-01-01 RX ADMIN — MORPHINE SULFATE 4 MG: 2 INJECTION, SOLUTION INTRAMUSCULAR; INTRAVENOUS at 14:37

## 2020-01-01 RX ADMIN — LANSOPRAZOLE 30 MG: KIT at 06:21

## 2020-01-01 RX ADMIN — IPRATROPIUM BROMIDE AND ALBUTEROL SULFATE 3 ML: 2.5; .5 SOLUTION RESPIRATORY (INHALATION) at 07:44

## 2020-01-01 RX ADMIN — IPRATROPIUM BROMIDE AND ALBUTEROL SULFATE 3 ML: 2.5; .5 SOLUTION RESPIRATORY (INHALATION) at 19:21

## 2020-01-01 RX ADMIN — TAZOBACTAM SODIUM AND PIPERACILLIN SODIUM 3.38 G: 375; 3 INJECTION, SOLUTION INTRAVENOUS at 04:04

## 2020-01-01 RX ADMIN — HALOPERIDOL LACTATE 1 MG: 5 INJECTION, SOLUTION INTRAMUSCULAR at 12:58

## 2020-01-01 RX ADMIN — TAZOBACTAM SODIUM AND PIPERACILLIN SODIUM 3.38 G: 375; 3 INJECTION, SOLUTION INTRAVENOUS at 14:43

## 2020-01-01 RX ADMIN — IPRATROPIUM BROMIDE AND ALBUTEROL SULFATE 3 ML: 2.5; .5 SOLUTION RESPIRATORY (INHALATION) at 09:07

## 2020-01-01 RX ADMIN — VANCOMYCIN HYDROCHLORIDE 1250 MG: 10 INJECTION, POWDER, LYOPHILIZED, FOR SOLUTION INTRAVENOUS at 10:38

## 2020-01-01 RX ADMIN — SODIUM CHLORIDE SOLN NEBU 7% 4 ML: 7 NEBU SOLN at 21:30

## 2020-01-01 RX ADMIN — IPRATROPIUM BROMIDE AND ALBUTEROL SULFATE 3 ML: 2.5; .5 SOLUTION RESPIRATORY (INHALATION) at 12:04

## 2020-01-01 RX ADMIN — DEXTROSE MONOHYDRATE 150 ML/HR: 50 INJECTION, SOLUTION INTRAVENOUS at 00:42

## 2020-01-01 RX ADMIN — SODIUM CHLORIDE 750 MG: 900 INJECTION, SOLUTION INTRAVENOUS at 17:12

## 2020-01-01 RX ADMIN — HALOPERIDOL LACTATE 1 MG: 5 INJECTION, SOLUTION INTRAMUSCULAR at 10:54

## 2020-01-01 RX ADMIN — GLYCOPYRROLATE 0.4 MG: 0.2 INJECTION, SOLUTION INTRAMUSCULAR; INTRAVITREAL at 15:45

## 2020-01-01 RX ADMIN — LANSOPRAZOLE 30 MG: KIT at 06:39

## 2020-01-01 RX ADMIN — IPRATROPIUM BROMIDE AND ALBUTEROL SULFATE 3 ML: 2.5; .5 SOLUTION RESPIRATORY (INHALATION) at 07:48

## 2020-01-01 RX ADMIN — INSULIN HUMAN 2 UNITS: 100 INJECTION, SOLUTION PARENTERAL at 06:31

## 2020-01-01 RX ADMIN — OLANZAPINE 5 MG: 5 TABLET ORAL at 20:23

## 2020-01-01 RX ADMIN — SODIUM CHLORIDE, PRESERVATIVE FREE 10 ML: 5 INJECTION INTRAVENOUS at 21:08

## 2020-01-01 RX ADMIN — ACETAMINOPHEN 650 MG: 325 TABLET, FILM COATED ORAL at 22:22

## 2020-01-01 RX ADMIN — SODIUM CHLORIDE SOLN NEBU 7% 4 ML: 7 NEBU SOLN at 19:07

## 2020-01-01 RX ADMIN — SODIUM CHLORIDE 125 ML/HR: 9 INJECTION, SOLUTION INTRAVENOUS at 23:20

## 2020-01-01 RX ADMIN — HYDROMORPHONE HYDROCHLORIDE 0.5 MG: 1 INJECTION, SOLUTION INTRAMUSCULAR; INTRAVENOUS; SUBCUTANEOUS at 08:05

## 2020-01-01 RX ADMIN — INSULIN HUMAN 2 UNITS: 100 INJECTION, SOLUTION PARENTERAL at 18:25

## 2020-01-01 RX ADMIN — SODIUM CHLORIDE SOLN NEBU 7% 4 ML: 7 NEBU SOLN at 19:12

## 2020-01-01 RX ADMIN — LANSOPRAZOLE 30 MG: KIT at 18:44

## 2020-01-01 RX ADMIN — HYDROMORPHONE HYDROCHLORIDE 0.5 MG: 1 INJECTION, SOLUTION INTRAMUSCULAR; INTRAVENOUS; SUBCUTANEOUS at 00:37

## 2020-01-01 RX ADMIN — SODIUM CHLORIDE 1746 ML: 9 INJECTION, SOLUTION INTRAVENOUS at 19:41

## 2020-01-01 RX ADMIN — LANSOPRAZOLE 30 MG: KIT at 17:40

## 2020-01-01 RX ADMIN — DEXTROSE MONOHYDRATE 75 ML/HR: 50 INJECTION, SOLUTION INTRAVENOUS at 23:03

## 2020-01-01 RX ADMIN — HYDROMORPHONE HYDROCHLORIDE 0.5 MG: 1 INJECTION, SOLUTION INTRAMUSCULAR; INTRAVENOUS; SUBCUTANEOUS at 12:45

## 2020-01-01 RX ADMIN — LORAZEPAM 0.5 MG: 2 INJECTION INTRAMUSCULAR; INTRAVENOUS at 09:10

## 2020-01-01 RX ADMIN — INSULIN LISPRO 2 UNITS: 100 INJECTION, SOLUTION INTRAVENOUS; SUBCUTANEOUS at 06:32

## 2020-01-01 RX ADMIN — TAZOBACTAM SODIUM AND PIPERACILLIN SODIUM 3.38 G: 375; 3 INJECTION, SOLUTION INTRAVENOUS at 03:29

## 2020-01-01 RX ADMIN — GLYCOPYRROLATE 0.4 MG: 0.2 INJECTION, SOLUTION INTRAMUSCULAR; INTRAVITREAL at 23:58

## 2020-01-01 RX ADMIN — LANSOPRAZOLE 30 MG: KIT at 06:56

## 2020-01-01 RX ADMIN — MINERAL SUPPLEMENT IRON 300 MG / 5 ML STRENGTH LIQUID 100 PER BOX UNFLAVORED 300 MG: at 11:16

## 2020-01-01 RX ADMIN — ACETAMINOPHEN 650 MG: 325 TABLET, FILM COATED ORAL at 14:19

## 2020-01-01 RX ADMIN — MINERAL SUPPLEMENT IRON 300 MG / 5 ML STRENGTH LIQUID 100 PER BOX UNFLAVORED 300 MG: at 08:00

## 2020-01-01 RX ADMIN — IPRATROPIUM BROMIDE AND ALBUTEROL SULFATE 3 ML: 2.5; .5 SOLUTION RESPIRATORY (INHALATION) at 20:43

## 2020-01-01 RX ADMIN — LORAZEPAM 0.5 MG: 2 INJECTION INTRAMUSCULAR; INTRAVENOUS at 12:46

## 2020-01-01 RX ADMIN — INSULIN HUMAN 2 UNITS: 100 INJECTION, SOLUTION PARENTERAL at 11:40

## 2020-01-01 RX ADMIN — DEXTROSE MONOHYDRATE 250 ML/HR: 50 INJECTION, SOLUTION INTRAVENOUS at 02:12

## 2020-01-01 RX ADMIN — ONDANSETRON 4 MG: 2 INJECTION INTRAMUSCULAR; INTRAVENOUS at 20:25

## 2020-01-01 RX ADMIN — GLYCOPYRROLATE 0.4 MG: 0.2 INJECTION, SOLUTION INTRAMUSCULAR; INTRAVITREAL at 00:37

## 2020-01-01 RX ADMIN — INSULIN LISPRO 5 UNITS: 100 INJECTION, SOLUTION INTRAVENOUS; SUBCUTANEOUS at 02:39

## 2020-01-01 RX ADMIN — QUETIAPINE FUMARATE 25 MG: 25 TABLET ORAL at 03:24

## 2020-01-01 RX ADMIN — SODIUM CHLORIDE, PRESERVATIVE FREE 10 ML: 5 INJECTION INTRAVENOUS at 09:12

## 2020-01-01 RX ADMIN — INSULIN HUMAN 2 UNITS: 100 INJECTION, SOLUTION PARENTERAL at 17:07

## 2020-01-01 RX ADMIN — INSULIN LISPRO 7 UNITS: 100 INJECTION, SOLUTION INTRAVENOUS; SUBCUTANEOUS at 17:12

## 2020-01-01 RX ADMIN — SCOPALAMINE 1 PATCH: 1 PATCH, EXTENDED RELEASE TRANSDERMAL at 15:35

## 2020-01-01 RX ADMIN — INSULIN LISPRO 6 UNITS: 100 INJECTION, SOLUTION INTRAVENOUS; SUBCUTANEOUS at 06:40

## 2020-01-01 RX ADMIN — DEXTROSE MONOHYDRATE 250 ML/HR: 50 INJECTION, SOLUTION INTRAVENOUS at 17:57

## 2020-01-01 RX ADMIN — INSULIN HUMAN 4 UNITS: 100 INJECTION, SOLUTION PARENTERAL at 06:08

## 2020-01-01 RX ADMIN — ACETAMINOPHEN 650 MG: 325 TABLET, FILM COATED ORAL at 18:15

## 2020-01-01 RX ADMIN — SODIUM CHLORIDE 100 ML/HR: 4.5 INJECTION, SOLUTION INTRAVENOUS at 02:42

## 2020-01-01 RX ADMIN — INSULIN HUMAN 2 UNITS: 100 INJECTION, SOLUTION PARENTERAL at 11:59

## 2020-01-01 RX ADMIN — GLYCOPYRROLATE 0.4 MG: 0.2 INJECTION, SOLUTION INTRAMUSCULAR; INTRAVITREAL at 05:42

## 2020-01-01 RX ADMIN — LORAZEPAM 0.5 MG: 2 INJECTION INTRAMUSCULAR; INTRAVENOUS at 05:42

## 2020-01-01 RX ADMIN — SODIUM CHLORIDE, PRESERVATIVE FREE 10 ML: 5 INJECTION INTRAVENOUS at 20:23

## 2020-01-01 RX ADMIN — DOXYCYCLINE 100 MG: 100 CAPSULE ORAL at 08:43

## 2020-01-01 RX ADMIN — IPRATROPIUM BROMIDE AND ALBUTEROL SULFATE 3 ML: 2.5; .5 SOLUTION RESPIRATORY (INHALATION) at 20:07

## 2020-01-01 RX ADMIN — IRON SUCROSE 200 MG: 20 INJECTION, SOLUTION INTRAVENOUS at 08:23

## 2020-01-01 RX ADMIN — CEFTRIAXONE SODIUM 1 G: 1 INJECTION, SOLUTION INTRAVENOUS at 17:22

## 2020-01-01 RX ADMIN — LANSOPRAZOLE 30 MG: KIT at 08:28

## 2020-01-01 RX ADMIN — SODIUM CHLORIDE, PRESERVATIVE FREE 10 ML: 5 INJECTION INTRAVENOUS at 08:16

## 2020-01-01 RX ADMIN — CEFTRIAXONE SODIUM 1 G: 1 INJECTION, SOLUTION INTRAVENOUS at 19:57

## 2020-01-01 RX ADMIN — SODIUM CHLORIDE, PRESERVATIVE FREE 10 ML: 5 INJECTION INTRAVENOUS at 21:20

## 2020-01-01 RX ADMIN — TAZOBACTAM SODIUM AND PIPERACILLIN SODIUM 3.38 G: 375; 3 INJECTION, SOLUTION INTRAVENOUS at 02:52

## 2020-01-01 RX ADMIN — HYDROMORPHONE HYDROCHLORIDE 0.5 MG: 1 INJECTION, SOLUTION INTRAMUSCULAR; INTRAVENOUS; SUBCUTANEOUS at 15:45

## 2020-01-01 RX ADMIN — LANSOPRAZOLE 30 MG: KIT at 12:45

## 2020-03-09 PROBLEM — R29.6 FALLS FREQUENTLY: Status: ACTIVE | Noted: 2020-01-01

## 2020-03-09 PROBLEM — R10.9 ABDOMINAL PAIN: Status: ACTIVE | Noted: 2020-01-01

## 2020-03-09 PROBLEM — D64.9 ANEMIA: Status: ACTIVE | Noted: 2020-01-01

## 2020-03-09 PROBLEM — R63.6 UNDERWEIGHT: Status: ACTIVE | Noted: 2020-01-01

## 2020-03-09 NOTE — ED NOTES
Patient states that he lives at home with his wife, who is admitted as a patient at this facility currently.     Fiordaliza Jacome, RN  03/09/20 0656
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
## 2020-03-10 PROBLEM — E87.6 HYPOKALEMIA: Status: ACTIVE | Noted: 2020-01-01

## 2020-03-10 PROBLEM — D50.9 IRON DEFICIENCY ANEMIA: Status: ACTIVE | Noted: 2020-01-01

## 2020-03-10 PROBLEM — R13.10 DYSPHAGIA: Status: ACTIVE | Noted: 2020-01-01

## 2020-03-10 NOTE — PLAN OF CARE
Problem: Patient Care Overview  Goal: Plan of Care Review  Outcome: Ongoing (interventions implemented as appropriate)  Flowsheets (Taken 3/10/2020 0408)  Progress: no change  Plan of Care Reviewed With: patient  Outcome Summary: No c/o pain, IV fluids, GI consult, Gtube, NPO no s/s of distress noted will continue monitor     Problem: Fall Risk (Adult)  Goal: Identify Related Risk Factors and Signs and Symptoms  Outcome: Ongoing (interventions implemented as appropriate)

## 2020-03-10 NOTE — PLAN OF CARE
Problem: Patient Care Overview  Goal: Plan of Care Review  Outcome: Ongoing (interventions implemented as appropriate)  Goal: Individualization and Mutuality  Outcome: Ongoing (interventions implemented as appropriate)  Goal: Discharge Needs Assessment  Outcome: Ongoing (interventions implemented as appropriate)  Goal: Interprofessional Rounds/Family Conf  Outcome: Ongoing (interventions implemented as appropriate)     Problem: Fall Risk (Adult)  Goal: Identify Related Risk Factors and Signs and Symptoms  Outcome: Ongoing (interventions implemented as appropriate)  Goal: Absence of Fall  Outcome: Ongoing (interventions implemented as appropriate)     Problem: Skin Injury Risk (Adult)  Goal: Identify Related Risk Factors and Signs and Symptoms  Outcome: Ongoing (interventions implemented as appropriate)  Goal: Skin Health and Integrity  Outcome: Ongoing (interventions implemented as appropriate)
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
## 2020-03-10 NOTE — PROGRESS NOTES
Discharge Planning Assessment  [facility]     Patient Name: Ajith Suresh  MRN: [id number]  Today's Date: 3/10/2020    Admit Date: 3/9/2020    Discharge Needs Assessment     Row Name 03/10/20 1223       Living Environment    Lives With  spouse    Name(s) of Who Lives With Patient  Dominik Rincon    Current Living Arrangements  home/apartment/condo    Primary Care Provided by  spouse/significant other    Provides Primary Care For  no one, unable/limited ability to care for self    Family Caregiver if Needed  spouse;child(moises), adult    Family Caregiver Names  Son Gomez, Wife Mckenzie    Quality of Family Relationships  helpful;involved;supportive    Able to Return to Prior Arrangements  yes       Resource/Environmental Concerns    Resource/Environmental Concerns  none    Transportation Concerns  -- EMS       Transition Planning    Patient/Family Anticipates Transition to  inpatient rehabilitation facility    Patient/Family Anticipated Services at Transition  ;skilled nursing    Transportation Anticipated  family or friend will provide       Discharge Needs Assessment    Readmission Within the Last 30 Days  no previous admission in last 30 days    Concerns to be Addressed  discharge planning    Equipment Currently Used at Home  none    Anticipated Changes Related to Illness  none    Equipment Needed After Discharge  none        Discharge Plan     Row Name 03/10/20 1236       Plan    Plan  SNF    Patient/Family in Agreement with Plan  yes    Plan Comments  Inbound call from anahy Gilman [phone]. Mukul stated patient's wife is primary caregiver and is on [facility]. Patient will need rehab at [place]. He requested referrals to facilities in [place] and the surrounding area. [facility] sent to [facility], [facility], and [facility]. Patient will need EMS transport due to trach with oxygen and peg tube. CCP to follow. Partial packet in CCP office. Maggy Dobbs RN        Destination      Service Provider  Request Status Selected Services Address Phone Number Fax Number    [facility] Pending - Request Sent N/A [address] [facility] [address] [phone]       Maggy Dobbs RN 3/10/2020 1235    [facility] - [facility] [address] [facility] [address] [phone]    [facility] - [facility] [address] [facility] [address] [phone]    [facility] Pending - Request Sent N/A [address], [facility] [address] [phone]             Demographic Summary     Row Name 03/10/20 1216       General Information    Admission Type  inpatient    Arrived From  emergency department    Referral Source  admission list    Preferred Language  English       Contact Information    Permission Granted to Share Info With  family/designee        Functional Status     Row Name 03/10/20 1217       Functional Status    Usual Activity Tolerance  fair    Current Activity Tolerance  fair       Functional Status, IADL    Medications  assistive person    Meal Preparation  assistive person    Housekeeping  assistive person    Laundry  assistive person    Shopping  assistive person       Mental Status Summary    Recent Changes in Mental Status/Cognitive Functioning  ability to speak clearly        Psychosocial     Row Name 03/10/20 1217       Behavior WDL    Behavior WDL  WDL       Emotion Mood WDL    Emotion/Mood/Affect WDL  WDL       Speech WDL    Speech WDL  ex;speech    Speech  dysarthric;other (see comments) Trach       Coping/Stress    Major Change/Loss/Stressor  none    Sources of Support  adult child(moises);spouse    Reaction to Health Status  accepting    Understanding of Condition and Treatment  poor grasp of illness/implications       Developmental Stage (Eriksson's)    Developmental Stage  Stage 8 (65 years-death/Late Adulthood) Integrity vs. Despair         Abuse/Neglect     Row Name 03/10/20 1222       Personal Safety    Feels Unsafe at Home or Work/School  no    Feels Threatened by Someone  no    Does Anyone Try to Keep You From Having Contact with Others or Doing Things Outside Your Home?  no    Physical Signs of Abuse Present  no                Maggy Dobbs RN
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
## 2020-03-10 NOTE — DISCHARGE PLACEMENT REQUEST
"Ajith Adam (74 y.o. Male)     Date of Birth Social Security Number Address Home Phone MRN    1945  [address] [facility] [address] [phone] [id number]    [employer] Marital Status          None        Admission Date Admission Type Admitting Provider Attending Provider Department, Room/Bed    3/9/20 Emergency Ray[facility]and MD Jackson, Alan David, MD [address], [address]    Discharge Date Discharge Disposition Discharge Destination                       Attending Provider:  Gabriel Sun MD    Allergies:  No Known Allergies    Isolation:  None   Infection:  None   Code Status:  CPR    Ht:  177.8 cm (70\")   Wt:  57.2 kg (126 lb 3.2 oz)    Admission Cmt:  None   Principal Problem:  Anemia [D64.9]                 Active Insurance as of 3/9/2020     Primary Coverage     Payor Plan Insurance Group Employer/Plan Group    MEDICARE MEDICARE A & B      Payor Plan Address Payor Plan Phone Number Payor Plan Fax Number Effective Dates    [address] [phone]  12/1/2012 - None Entered    [place] [id number]       Subscriber Name Subscriber Birth Date Member ID       AJITH ADAM 1945 [id number]                 Emergency Contacts      (Rel.) Home Phone Work Phone Mobile Phone    KEVAN ADAM (Spouse) [phone] -- --              "
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
## 2020-03-11 PROBLEM — D50.0 IRON DEFICIENCY ANEMIA DUE TO CHRONIC BLOOD LOSS: Status: ACTIVE | Noted: 2020-01-01

## 2020-03-11 NOTE — PLAN OF CARE
Problem: Patient Care Overview  Goal: Plan of Care Review  Outcome: Ongoing (interventions implemented as appropriate)  Flowsheets (Taken 3/11/2020 5224)  Progress: no change  Plan of Care Reviewed With: patient  Outcome Summary: No c/o pain, IV fluids, 1 unit of PRBC given, refused tube feed bolus, No s/s distress noted will continue to monitor     Problem: Fall Risk (Adult)  Goal: Identify Related Risk Factors and Signs and Symptoms  Outcome: Ongoing (interventions implemented as appropriate)     Problem: Skin Injury Risk (Adult)  Goal: Identify Related Risk Factors and Signs and Symptoms  Outcome: Ongoing (interventions implemented as appropriate)

## 2020-03-11 NOTE — PLAN OF CARE
Problem: Patient Care Overview  Goal: Plan of Care Review  Outcome: Ongoing (interventions implemented as appropriate)  Flowsheets (Taken 3/11/2020 1222)  Plan of Care Reviewed With: patient  Outcome Summary: Pt having difficulty following max cues / directions, but incr amb to 60' req fww and min A x 2. Pt is very unsteady w/ shuffling steps and was stopped freq to reset reno and gait length for longer stride. Management of walker req for occasional steering and to prevent pt from lifting. PT will continue to progress as tolerated.

## 2020-03-11 NOTE — PROGRESS NOTES
"    DAILY PROGRESS NOTE  [facility]    Patient Identification:  Name: Ajith Suresh  Age: 74 y.o.  Sex: male  :  1945  MRN: [id number]         Primary Care Physician: Provider, No Known    Subjective:  Interval History: Not voicing any new complaints per the patient.  I discussed with him GIs concerns for etiology of the bleed as well as need for endoscopy and he seemed very accepting and willing to proceed with the above procedure.  Denies any chest pain or any new trouble breathing    Objective: No family at bedside though aids are present.  Case discussed in multidisciplinary rounds    Scheduled Meds:  famotidine 20 mg Per G Tube BID AC   iron sucrose 200 mg Intravenous Daily     Continuous Infusions:     Vital signs in last 24 hours:  Temp:  [98 °F (36.7 °C)-98.5 °F (36.9 °C)] 98 °F (36.7 °C)  Heart Rate:  [63-86] 63  Resp:  [18] 18  BP: (125-148)/(60-75) 125/60    Intake/Output:    Intake/Output Summary (Last 24 hours) at 3/11/2020 1341  Last data filed at 3/10/2020 2158  Gross per 24 hour   Intake 505 ml   Output 300 ml   Net 205 ml       Exam:  /60 (BP Location: Left arm, Patient Position: Lying)   Pulse 63   Temp 98 °F (36.7 °C) (Oral)   Resp 18   Ht 177.8 cm (70\")   Wt 57.2 kg (126 lb 3.2 oz)   SpO2 100%   BMI 18.11 kg/m²     General Appearance:    Alert, cooperative, no distress                          Head:  Chronic facial deformities                           Neck: Tracheostomy seems to be functioning just fine                         lungs:    Clear to auscultation bilaterally, respirations unlabored                          Heart:    Regular rate and rhythm, S1 and S2 normal                  Abdomen:     Soft, non-tender, bowel sounds active                 Extremities:   No cyanosis or edema                           Data Review:  Labs in chart were reviewed.    Assessment:  Active Hospital Problems    Diagnosis  POA   • **Iron deficiency anemia [D50.9]  Yes   • " Dysphagia [R13.10]  Yes   • Hypokalemia [E87.6]  Yes   • Falls frequently [R29.6]  Not Applicable   • Underweight BMI 18.1 [R63.6]  Yes   • Abdominal pain [R10.9]  Yes      Resolved Hospital Problems   No resolved problems to display.       Plan:    Transfused 2u pRBCS and hemoglobin 8.1    Endoscopy encouraged as patient declined yesterday but seems willing to participate at this point   -GI consulted    Monitor hypokalemia and check mag    Routine care for tracheostomy    King Cespedes MD  3/11/2020  13:41

## 2020-03-11 NOTE — PROGRESS NOTES
Continued Stay Note  [facility]     Patient Name: Ajith Suresh  MRN: [id number]  Today's Date: 3/11/2020    Admit Date: 3/9/2020    Discharge Plan     Row Name 03/11/20 1415       Plan    Plan  [facility] vs [facility]    Patient/Family in Agreement with Plan  yes    Plan Comments  Outbound call to son Eulalio. Updated that [facility] has accepted and still waiting to hear back from [facility]. Eulalio agreeable to [facility] if unable to accept at [facility]. Updated Eulalio on POC and possible egd/colonoscopy. Verbalized understanding. CCP to follow. Maggy Dobbs RN        Discharge Codes    No documentation.             Maggy Dobbs RN

## 2020-03-11 NOTE — PROGRESS NOTES
Continued Stay Note  [facility]     Patient Name: Ajith Suresh  MRN: [id number]  Today's Date: 3/11/2020    Admit Date: 3/9/2020    Discharge Plan     Row Name 03/11/20 1000       Plan    Plan  SNF    Patient/Family in Agreement with Plan  yes    Plan Comments  Outbound call to [facility]. Left  for admission director regarding the referral. Patient has been accepted at [facility]. CCP to follow. Maggy Dobbs RN        Discharge Codes    No documentation.             Maggy Dobbs RN

## 2020-03-11 NOTE — CONSULTS
Adult Nutrition  Assessment/PES    Patient Name:  Ajith Suresh  YOB: 1945  MRN: [id number]  Admit Date:  3/9/2020    Assessment Date:  3/11/2020    Comments:  Follow up note. TF's were ordered for Impact Peptide 240cc x 5 per day with 155cc water. Discussed with RN, pt refused TF's yesterday, requesting coffee. Note MD rec's for a EGD and colonoscopy but pt refusing at this time. Will continue to follow.    Reason for Assessment     Row Name 03/11/20 0913          Reason for Assessment    Reason For Assessment  follow-up protocol         Nutrition/Diet History     Row Name 03/11/20 0913          Nutrition/Diet History    Typical Food/Fluid Intake  pt refused feedings yesturday, asking for coffee instead, GI rec's EGD and colonoscopy but pt refusing           Labs/Tests/Procedures/Meds     Row Name 03/11/20 0914          Labs/Procedures/Meds    Lab Results Reviewed  reviewed        Diagnostic Tests/Procedures    Diagnostic Test/Procedure Reviewed  reviewed        Medications    Pertinent Medications Reviewed  reviewed         Physical Findings     Row Name 03/11/20 0914          Physical Findings    Tubes  PEG     Skin  -- leonardo 17           Nutrition Prescription Ordered     Row Name 03/11/20 0914          Nutrition Prescription PO    Current PO Diet  NPO        Nutrition Prescription EN    Enteral Route  PEG     Product  Impact Peptide 1.5 (Pivot 1.5)     TF Delivery Method  Bolus     TF Bolus Goal Volume (mL)  240 mL     TF Bolus Frequency  5 times a day     Water flush (mL)   155 mL     Water Flush Frequency  Every feeding                 Problem/Interventions:          Intervention Goal     Row Name 03/11/20 0915          Intervention Goal    General  Maintain nutrition;Nutrition support treatment;Reduce/improve symptoms;Improved nutrition related lab(s);Meet nutritional needs for age/condition;Disease management/therapy     TF/PN  Tolerate TF at goal     Weight  Appropriate weight gain          Nutrition Intervention     Row Name 03/11/20 0915          Nutrition Intervention    RD/Tech Action  Follow Tx progress;Care plan reviewd         Nutrition Prescription     Row Name 03/11/20 0915          Nutrition Prescription EN    Enteral Prescription  Continue same protocol         Education/Evaluation     Row Name 03/11/20 0915          Monitor/Evaluation    Monitor  Per protocol;I&O;Pertinent labs;TF delivery/tolerance;Weight;Skin status           Electronically signed by:  Nunu Delacruz RD  03/11/20 09:16

## 2020-03-11 NOTE — PLAN OF CARE
Pt tolerating bolus feeds. No c/o pain n/v or soa this shift. Vss. Trach care done per students and instructor. Pt worked with PT today. Will continue to monitor
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
## 2020-03-11 NOTE — PROGRESS NOTES
"[facility]/[place]     Inpatient Follow Up Note    Patient Identification:  Name: Ajith Suresh  Age: 74 y.o.  Sex: male  :  1945  MRN: [id number]    Information from:patient     CC: BETSY    History:   Patient much more alert today.  Difficult to communicate with because of speech issues.  He has agreed to endoscopy and is willing to undergo this study tomorrow.  However, he does not wish to undergo colonoscopy.    Review of Systems:  Constitutional:  Negative   Cardiovascular:  Negative   Respiratory:  Negative             Problem List:  Patient Active Problem List    Diagnosis   • *Iron deficiency anemia [D50.9]   • Iron deficiency anemia due to chronic blood loss [D50.0]   • Dysphagia [R13.10]   • Hypokalemia [E87.6]   • Falls frequently [R29.6]   • Underweight BMI 18.1 [R63.6]   • Abdominal pain [R10.9]     Current Meds:  MAR Reviewed  Scheduled Meds:  famotidine 20 mg Per G Tube BID AC   iron sucrose 200 mg Intravenous Daily     Continuous Infusions:   PRN Meds:.•  acetaminophen **OR** acetaminophen **OR** acetaminophen  •  nitroglycerin  •  ondansetron  •  [COMPLETED] Insert peripheral IV **AND** sodium chloride  Allergies:  No Known Allergies    Intake/Output:     Intake/Output Summary (Last 24 hours) at 3/11/2020 1611  Last data filed at 3/11/2020 1522  Gross per 24 hour   Intake 885 ml   Output 600 ml   Net 285 ml     New allergies/reactions:  None    Physical Exam:  Vitals:   Temp (24hrs), Av.2 °F (36.8 °C), Min:98 °F (36.7 °C), Max:98.5 °F (36.9 °C)    Temp:  [98 °F (36.7 °C)-98.5 °F (36.9 °C)] 98 °F (36.7 °C)  Heart Rate:  [63-86] 63  Resp:  [18] 18  BP: (125-148)/(60-75) 125/60  /60 (BP Location: Left arm, Patient Position: Lying)   Pulse 63   Temp 98 °F (36.7 °C) (Oral)   Resp 18   Ht 177.8 cm (70\")   Wt 57.2 kg (126 lb 3.2 oz)   SpO2 100%   BMI 18.11 kg/m²     Exam:  NAD  PERRLA. Sclerae and conjunctivae normal  Alert, oriented, normal affect. "         DATA:  Radiology and Labs:   Recent Results (from the past 24 hour(s))   Hemoglobin & Hematocrit, Blood    Collection Time: 03/10/20 11:52 PM   Result Value Ref Range    Hemoglobin 7.3 (L) 13.0 - 17.7 g/dL    Hematocrit 25.0 (L) 37.5 - 51.0 %   Prepare RBC, 1 Units    Collection Time: 03/11/20  6:00 AM   Result Value Ref Range    Product Code M8877R45     Unit Number [id number]     UNIT  ABO O     UNIT  RH NEG     Crossmatch Interpretation Compatible     Dispense Status PT     Blood Type ONEG     Blood Expiration Date 176427733295     Blood Type Barcode 9500     Product Code O6563U64     Unit Number [id number]     UNIT  ABO O     UNIT  RH NEG     Crossmatch Interpretation Compatible     Dispense Status XM     Blood Type ONEG     Blood Expiration Date 586442099893     Blood Type Barcode 9500    Vitamin B12    Collection Time: 03/11/20  7:47 AM   Result Value Ref Range    Vitamin B-12 394 211 - 946 pg/mL   Hemoglobin & Hematocrit, Blood    Collection Time: 03/11/20  7:47 AM   Result Value Ref Range    Hemoglobin 8.1 (L) 13.0 - 17.7 g/dL    Hematocrit 28.6 (L) 37.5 - 51.0 %       Assessment:   Problem List:     Iron deficiency anemia    Falls frequently    Underweight BMI 18.1    Abdominal pain    Dysphagia    Hypokalemia    Iron deficiency anemia due to chronic blood loss    The patient has agreed to EGD.    Plan:   EGD scheduled for tomorrow.      Maxim Powell MD  [facility]/[place]  3/11/2020

## 2020-03-12 NOTE — PLAN OF CARE
Problem: Patient Care Overview  Goal: Plan of Care Review  3/12/2020 0952 by Ayala Doe PTA  Outcome: Ongoing (interventions implemented as appropriate)  Flowsheets (Taken 3/12/2020 0946)  Plan of Care Reviewed With: patient  Outcome Summary: Pt improving w/ amb distance of 150' req fww and min A x 2. Pt gait is choppy and pt exhibiting difficulty at times advancing RLE. Pt req max cues and was stopped often to restart sequencing. He also req max assist for mgmt and steering of fww. PT will continue to progress as tolerated.  3/12/2020 0952 by Ayala Doe PTA  Reactivated

## 2020-03-12 NOTE — ANESTHESIA POSTPROCEDURE EVALUATION
"Patient: Ajith Suresh    Procedure Summary     Date:  03/12/20 Room / Location:  [facility] ENDOSCOPY 1 /  [facility]    Anesthesia Start:  1052 Anesthesia Stop:  1119    Procedure:  ESOPHAGOGASTRODUODENOSCOPY (N/A Esophagus) Diagnosis:       Iron deficiency anemia due to chronic blood loss      (Iron deficiency anemia due to chronic blood loss [D50.0])    Surgeon:  Maxim Powell MD Provider:  Sudheer Munoz MD    Anesthesia Type:  MAC ASA Status:  3          Anesthesia Type: MAC    Vitals  Vitals Value Taken Time   /57 3/12/2020 11:17 AM   Temp     Pulse 60 3/12/2020 11:17 AM   Resp 16 3/12/2020 11:17 AM   SpO2 100 % 3/12/2020 11:17 AM           Post Anesthesia Care and Evaluation    Patient location during evaluation: bedside  Patient participation: complete - patient participated  Level of consciousness: awake  Pain score: 2  Pain management: adequate  Airway patency: patent  Anesthetic complications: No anesthetic complications  PONV Status: none  Cardiovascular status: acceptable  Respiratory status: acceptable  Hydration status: acceptable    Comments: /57   Pulse 60   Temp 36.6 °C (97.9 °F) (Oral)   Resp 16   Ht 177.8 cm (70\")   Wt 57.2 kg (126 lb 3.2 oz)   SpO2 100%   BMI 18.11 kg/m²         "

## 2020-03-12 NOTE — PROGRESS NOTES
Brief (courtesy) procedure note:    Procedure: EGD    Pre-op diagnosis: GI bleed    Post-op diagnosis: Multiple small gastric and duodenal ulcers. Bx.     Recommendations: D/C Pepcid. Lansoprazole 30 mg per tube BID.    Please refer to the Provation-generated note for photos and further details.     Maxim Powell MD

## 2020-03-12 NOTE — CONSULTS
Adult Nutrition  Assessment/PES    Patient Name:  Ajith Suresh  YOB: 1945  MRN: [id number]  Admit Date:  3/9/2020    Assessment Date:  3/12/2020    Comments:  TF follow up note. Pt agreed to bolus feeds and tolerated. Note pt now has agreed to endoscopy.Will continue to follow.    Reason for Assessment     Row Name 03/12/20 0650          Reason for Assessment    Reason For Assessment  follow-up protocol;TF/PN         Nutrition/Diet History     Row Name 03/12/20 0650          Nutrition/Diet History    Typical Food/Fluid Intake  Pt taking bolus feeds and tolerating, he has now agreed to proceed with endoscopy           Labs/Tests/Procedures/Meds     Row Name 03/12/20 0651          Labs/Procedures/Meds    Lab Results Reviewed  reviewed     Lab Results Comments  na, H/H        Diagnostic Tests/Procedures    Diagnostic Test/Procedure Reviewed  reviewed        Medications    Pertinent Medications Reviewed  reviewed     Pertinent Medications Comments  pepcid         Physical Findings     Row Name 03/12/20 0652          Physical Findings    Tubes  PEG     Skin  -- intact           Nutrition Prescription Ordered     Row Name 03/12/20 0653          Nutrition Prescription PO    Current PO Diet  NPO        Nutrition Prescription EN    Enteral Route  PEG     Product  Impact Peptide 1.5 (Pivot 1.5)     TF Delivery Method  Bolus     TF Bolus Goal Volume (mL)  240 mL     TF Bolus Frequency  5 times a day     Water flush (mL)   155 mL     Water Flush Frequency  Every feeding         Evaluation of Received Nutrient/Fluid Intake     Row Name 03/12/20 0653          Calories Evaluation    Enteral Calories (kcal)  1800     % of Kcal Needs  100        Protein Evaluation    Enteral Protein (gm)  112     % of Protein Needs  100        Fluid Intake Evaluation    Enteral (Free Water) Fluid (mL)  932     Free Water Flush Fluid (mL)  775     Total Free Water Intake (mL)  1707 mL        EN Evaluation    TF Tolerance  -- BM  3/10     HOB  Greater than or equal to 30 degress               Problem/Interventions:          Intervention Goal     Row Name 03/12/20 0655          Intervention Goal    General  Maintain nutrition;Nutrition support treatment;Improved nutrition related lab(s);Meet nutritional needs for age/condition;Disease management/therapy     TF/PN  Tolerate TF at goal     Weight  Appropriate weight gain         Nutrition Intervention     Row Name 03/12/20 0655          Nutrition Intervention    RD/Tech Action  Follow Tx progress;Care plan reviewd         Nutrition Prescription     Row Name 03/12/20 0655          Nutrition Prescription EN    Enteral Prescription  Continue same protocol         Education/Evaluation     Row Name 03/12/20 0655          Monitor/Evaluation    Monitor  Per protocol;I&O;Pertinent labs;TF delivery/tolerance;Weight;Skin status           Electronically signed by:  Nunu Delacruz RD  03/12/20 06:56
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
## 2020-03-12 NOTE — PROGRESS NOTES
Continued Stay Note  [facility]     Patient Name: Ajith Suresh  MRN: [id number]  Today's Date: 3/12/2020    Admit Date: 3/9/2020    Discharge Plan     Row Name 03/12/20 1318       Plan    Plan  [facility]    Patient/Family in Agreement with Plan  yes    Plan Comments  Unable to reach [facility] regarding referral. Accepted at [facility]. Partial packet in CCP office. Will need EMS. Maggy Dobbs RN        Discharge Codes    No documentation.             Maggy Dobbs RN

## 2020-03-12 NOTE — PROGRESS NOTES
"    DAILY PROGRESS NOTE  [facility]    Patient Identification:  Name: Ajith Suresh  Age: 74 y.o.  Sex: male  :  1945  MRN: [id number]         Primary Care Physician: Provider, No Known    Subjective:  Interval History: Very difficult to understand secondary to his past history and facial abnormalities.  He is not voicing anything new today.  He is actually wanting to try to take some oral liquids via coffee.  Denies any chest pain or troubles breathing or abdominal pain    Objective: No family at bedside.  Case discussed in multidisciplinary rounds    Scheduled Meds:  iron sucrose 200 mg Intravenous Daily   lansoprazole 30 mg Per G Tube BID AC     Continuous Infusions:  lactated ringers 30 mL/hr Last Rate: Stopped (20 1129)       Vital signs in last 24 hours:  Temp:  [97.8 °F (36.6 °C)-97.9 °F (36.6 °C)] 97.9 °F (36.6 °C)  Heart Rate:  [] 53  Resp:  [16-18] 16  BP: (101-132)/(57-76) 132/63    Intake/Output:    Intake/Output Summary (Last 24 hours) at 3/12/2020 1211  Last data filed at 3/12/2020 1129  Gross per 24 hour   Intake 835 ml   Output 925 ml   Net -90 ml       Exam:  /63 (BP Location: Left arm, Patient Position: Lying)   Pulse 53   Temp 97.9 °F (36.6 °C) (Oral)   Resp 16   Ht 177.8 cm (70\")   Wt 57.2 kg (126 lb 3.2 oz)   SpO2 100%   BMI 18.11 kg/m²     General Appearance:    Alert, cooperative, no distress, AAOx3                          Neck: Tracheostomy                         Lungs:    Clear to auscultation bilaterally, respirations unlabored                          Heart:    Regular rate and rhythm, S1 and S2 normal                  Abdomen:     Soft, non-tender, bowel sounds active                 Extremities:   No cyanosis or edema                       Data Review:  Labs in chart were reviewed.    Assessment:  Active Hospital Problems    Diagnosis  POA   • **Iron deficiency anemia [D50.9]  Yes   • Iron deficiency anemia due to chronic blood loss " [D50.0]  Unknown   • Dysphagia [R13.10]  Yes   • Hypokalemia [E87.6]  Yes   • Falls frequently [R29.6]  Not Applicable   • Underweight BMI 18.1 [R63.6]  Yes   • Abdominal pain [R10.9]  Yes      Resolved Hospital Problems   No resolved problems to display.       Plan:    Transfused 2 units packed red blood cells on 3/11/2020 hemoglobin is actually dropped from 8.1-7.8   -Give 2 more units now and repeat in a.m.   -Await final EGD results but it appears source was found as multiple ulcers were noted and Pepcid has been exchanged out for twice daily PPI   -Resume tube feeds and ensure patient is able to tolerate    Replace electrolyte imbalance -K just a bit low today 8 though mag is therapeutic    Routine care for tracheostomy    Disposition -anticipate patient be ready in the next day or 2 pending H&H and clinical progression    King Cespedes MD  3/12/2020  12:11

## 2020-03-12 NOTE — PLAN OF CARE
Problem: Patient Care Overview  Goal: Plan of Care Review  Flowsheets (Taken 3/12/2020 1710)  Progress: improving  Plan of Care Reviewed With: patient  Outcome Summary: denies pain, denies nausea, colby tube feeds, egd completed, blood infusing, hob elevated 300 degrees, vitals stable

## 2020-03-13 NOTE — PLAN OF CARE
Problem: Patient Care Overview  Goal: Plan of Care Review  Outcome: Ongoing (interventions implemented as appropriate)  Flowsheets (Taken 3/13/2020 5703)  Progress: no change  Plan of Care Reviewed With: patient  Outcome Summary: Pt restless most of the night. 2 u PRBC given. Pt arguementative saying he wants applesauce and coffee thru feeding tube. Bolus feedings given as directed. Will continue to monitor

## 2020-03-13 NOTE — PROGRESS NOTES
"    DAILY PROGRESS NOTE  [facility]    Patient Identification:  Name: Ajith Suresh  Age: 74 y.o.  Sex: male  :  1945  MRN: [id number]         Primary Care Physician: Provider, No Known    Subjective:  Interval History: Not voicing any new complaints today and clinically feels much better.  Denies any nausea or vomiting with reinstitution of tube feeds if he has been tolerating.    Objective: No family at bedside.  Case discussed with RN as well as a multidisciplinary rounds    Scheduled Meds:  lansoprazole 30 mg Per G Tube BID AC     Continuous Infusions:     Vital signs in last 24 hours:  Temp:  [97.2 °F (36.2 °C)-98.3 °F (36.8 °C)] 97.5 °F (36.4 °C)  Heart Rate:  [53-73] 61  Resp:  [16-18] 16  BP: (118-143)/(63-80) 122/77    Intake/Output:    Intake/Output Summary (Last 24 hours) at 3/13/2020 1145  Last data filed at 3/13/2020 1122  Gross per 24 hour   Intake 1259.58 ml   Output 1450 ml   Net -190.42 ml       Exam:  /77 (BP Location: Left arm, Patient Position: Lying)   Pulse 61   Temp 97.5 °F (36.4 °C) (Axillary)   Resp 16   Ht 177.8 cm (70\")   Wt 57.2 kg (126 lb 3.2 oz)   SpO2 98%   BMI 18.11 kg/m²     General Appearance:    Alert, cooperative, no distress, AAOx3                        Throat:   Oral mucosa pink and moist                         Lungs:    Clear to auscultation bilaterally, respirations unlabored                          Heart:    Regular rate and rhythm, S1 and S2 normal                 Abdomen:     Soft, non-tender, bowel sounds active, PEG                 extremities:   No cyanosis or edema                            Data Review:  Labs in chart were reviewed.    Assessment:  Active Hospital Problems    Diagnosis  POA   • **Iron deficiency anemia [D50.9]  Yes   • Iron deficiency anemia due to chronic blood loss [D50.0]  Unknown   • Dysphagia [R13.10]  Yes   • Hypokalemia [E87.6]  Yes   • Falls frequently [R29.6]  Not Applicable   • Underweight BMI 18.1 " [R63.6]  Yes   • Abdominal pain [R10.9]  Yes      Resolved Hospital Problems   No resolved problems to display.       Plan:    Transfused 2 units packed red blood cells on 3/11/2020 hemoglobin as actually dropped from 8.1-7.8 and gave an additional 2 units on 3/12/2020 with hemoglobin trended up to 9.9 today              -EGD demonstrated multiple ulcers and Pepcid has been exchanged out for twice daily PPI              -Tolerating tube feeds       Routine care for tracheostomy     Disposition -ultimately [facility] is working on disposition    King Cespedes MD  3/13/2020  11:45

## 2020-03-13 NOTE — PROGRESS NOTES
Adult Nutrition  Assessment/PES    Patient Name:  Ajith Suresh  YOB: 1945  MRN: [id number]  Admit Date:  3/9/2020    Assessment Date:  3/13/2020    Comments:  Nutrition follow up.  Bolus TFs of Impact Peptide continue.  RN reports patient is tolerating them well. S/p EGD yesterday, multiple ulcers noted.  Patient very manipulative per staff.  Placement still being determined.    RD to continue to follow.    Reason for Assessment     Row Name 03/13/20 1426          Reason for Assessment    Reason For Assessment  follow-up protocol;TF/PN         Nutrition/Diet History     Row Name 03/13/20 1426          Nutrition/Diet History    Typical Food/Fluid Intake  tolerating TFs per RN, patient is wanting to put coffee through Gtube and applesauce         Anthropometrics     Row Name 03/13/20 1427          Admit Weight    Admit Weight  -- 126# 3/10        Body Mass Index (BMI)    BMI Assessment  BMI 17-18.4: protein-energy malnutrition grade I         Labs/Tests/Procedures/Meds     Row Name 03/13/20 1427          Labs/Procedures/Meds    Lab Results Reviewed  reviewed, pertinent     Lab Results Comments  Hgb, Hct        Diagnostic Tests/Procedures    Diagnostic Test/Procedure Reviewed  reviewed, pertinent     Diagnostic Test/Procedures Comments  s/p EGD yesterday, multiple ulcers noted yesterday        Medications    Pertinent Medications Reviewed  reviewed, pertinent         Physical Findings     Row Name 03/13/20 1428          Physical Findings    Overall Physical Appearance  underweight     Gastrointestinal  feeding tube     Tubes  PEG     Skin  -- B=20, bruised         Nutrition Prescription Ordered     Row Name 03/13/20 1428          Nutrition Prescription PO    Current PO Diet  NPO        Nutrition Prescription EN    Enteral Route  PEG     Product  Impact Peptide 1.5 (Pivot 1.5)     TF Delivery Method  Bolus     TF Bolus Goal Volume (mL)  240 mL     TF Bolus Frequency  5 times a day     Water flush  (mL)   155 mL     Water Flush Frequency  Every feeding         Evaluation of Received Nutrient/Fluid Intake     Row Name 03/13/20 1429          Nutrient/Fluid Evaluation    Additional Documentation  Calories Evaluation (Group);Protein Evaluation (Group);Fluid Intake Evaluation (Group)        Calories Evaluation    Enteral Calories (kcal)  1800     % of Kcal Needs  100        Protein Evaluation    Enteral Protein (gm)  112     % of Protein Needs  100        Intake Assessment    Energy/Calorie Requirement Assessment  meeting needs     Protein Requirement Assessment  meeting needs        Fluid Intake Evaluation    Enteral (Free Water) Fluid (mL)  932     Free Water Flush Fluid (mL)  775     Total Free Water Intake (mL)  1707 mL        EN Evaluation    HOB  Greater than or equal to 30 degress         Problem/Interventions:    Intervention Goal     Row Name 03/13/20 1429          Intervention Goal    General  Maintain nutrition;Reduce/improve symptoms;Nutrition support treatment;Disease management/therapy;Meet nutritional needs for age/condition     TF/PN  Maintain TF/PN     Weight  Appropriate weight gain         Nutrition Intervention     Row Name 03/13/20 1429          Nutrition Intervention    RD/Tech Action  Follow Tx progress;Care plan reviewd         Nutrition Prescription     Row Name 03/13/20 1429          Nutrition Prescription EN    Enteral Prescription  Continue same protocol         Education/Evaluation     Row Name 03/13/20 1430          Education    Education  Will Instruct as appropriate        Monitor/Evaluation    Monitor  Per protocol;I&O;Pertinent labs;TF delivery/tolerance;Weight;Symptoms           Electronically signed by:  Janet Dowling RD  03/13/20 14:32
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
## 2020-03-13 NOTE — PLAN OF CARE
Patient has been very demanding this shift. Acting out when he does not receive coffee to place in his g tube. Opening gtube and spilling gi content. Agreed to trach care. Pt received 4 out of five bolus feeds this shift. No pain n/v or soa. Vss will continue to monitor

## 2020-03-13 NOTE — PROGRESS NOTES
"[facility]     Inpatient Follow Up Note    Patient Identification:  Name: Ajith Suresh  Age: 74 y.o.  Sex: male  :  1945  MRN: [id number]    Information from:patient     CC: Peptic ulcers    History:   Patient feels better.  He is tolerating tube feedings better and is not having any epigastric distress.  No melena or hematochezia.  Gastric biopsies have returned.  They are negative for neoplasia and Helicobacter.  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    Review of Systems:  Constitutional:  Negative   Cardiovascular:  Negative   Respiratory:  Negative             Problem List:  Patient Active Problem List    Diagnosis   • *Iron deficiency anemia [D50.9]   • Iron deficiency anemia due to chronic blood loss [D50.0]   • Dysphagia [R13.10]   • Hypokalemia [E87.6]   • Falls frequently [R29.6]   • Underweight BMI 18.1 [R63.6]   • Abdominal pain [R10.9]     Current Meds:  MAR Reviewed  Scheduled Meds:  lansoprazole 30 mg Per G Tube BID AC     Continuous Infusions:   PRN Meds:.•  acetaminophen **OR** acetaminophen **OR** acetaminophen  •  nitroglycerin  •  ondansetron  •  [COMPLETED] Insert peripheral IV **AND** sodium chloride  Allergies:  No Known Allergies    Intake/Output:     Intake/Output Summary (Last 24 hours) at 3/13/2020 1742  Last data filed at 3/13/2020 1500  Gross per 24 hour   Intake 1259.58 ml   Output 1200 ml   Net 59.58 ml     New allergies/reactions:  None    Physical Exam:  Vitals:   Temp (24hrs), Av.7 °F (36.5 °C), Min:97.5 °F (36.4 °C), Max:98.3 °F (36.8 °C)    Temp:  [97.5 °F (36.4 °C)-98.3 °F (36.8 °C)] 97.7 °F (36.5 °C)  Heart Rate:  [61-70] 61  Resp:  [16-18] 16  BP: (118-143)/(65-80) 140/70  /70 (BP Location: Left arm, Patient Position: Lying)   Pulse 61   Temp 97.7 °F (36.5 °C) (Oral)   Resp 16   Ht 177.8 cm (70\")   Wt 57.2 kg (126 lb 3.2 oz)   SpO2 98%   BMI 18.11 kg/m²     Exam:  NAD  PERRLA. Sclerae and conjunctivae " normal  No respiratory distress. Lungs clear.  Alert, oriented, normal affect.         DATA:  Radiology and Labs:   Recent Results (from the past 24 hour(s))   Prepare RBC, 2 Units    Collection Time: 03/12/20 10:29 PM   Result Value Ref Range    Product Code A2037W54     Unit Number [id number]     UNIT  ABO O     UNIT  RH NEG     Crossmatch Interpretation Compatible     Dispense Status IS     Blood Type ONEG     Blood Expiration Date 426699663529     Blood Type Barcode 9500    Prepare RBC, 1 Units    Collection Time: 03/13/20  6:00 AM   Result Value Ref Range    Product Code O5230W06     Unit Number [id number]     UNIT  ABO O     UNIT  RH NEG     Crossmatch Interpretation Compatible     Dispense Status PT     Blood Type ONEG     Blood Expiration Date 059800531234     Blood Type Barcode 9500     Product Code Q9600I86     Unit Number [id number]     UNIT  ABO O     UNIT  RH NEG     Crossmatch Interpretation Compatible     Dispense Status PT     Blood Type ONEG     Blood Expiration Date 718982797396     Blood Type Barcode 9500    CBC (No Diff)    Collection Time: 03/13/20  6:04 AM   Result Value Ref Range    WBC 5.47 3.40 - 10.80 10*3/mm3    RBC 4.64 4.14 - 5.80 10*6/mm3    Hemoglobin 9.9 (L) 13.0 - 17.7 g/dL    Hematocrit 32.7 (L) 37.5 - 51.0 %    MCV 70.5 (L) 79.0 - 97.0 fL    MCH 21.3 (L) 26.6 - 33.0 pg    MCHC 30.3 (L) 31.5 - 35.7 g/dL    RDW 21.7 (H) 12.3 - 15.4 %    RDW-SD 53.3 37.0 - 54.0 fl    MPV 10.4 6.0 - 12.0 fL    Platelets 293 140 - 450 10*3/mm3       Assessment:   Problem List:     Iron deficiency anemia    Falls frequently    Underweight BMI 18.1    Abdominal pain    Dysphagia    Hypokalemia    Iron deficiency anemia due to chronic blood loss    Peptic ulcer disease this is a sufficient (but not necessary) explanation of his iron deficiency.    Plan:   PPI therapy for 3 months.  I will sign off for now.      Maxim Powell MD  [facility]  3/13/2020

## 2020-03-13 NOTE — PLAN OF CARE
Problem: Patient Care Overview  Goal: Plan of Care Review  Flowsheets  Taken 3/13/2020 0689 by Antoinette Albarran RN  Progress: no change  Taken 3/13/2020 1545 by Ursula Nunes PT  Plan of Care Reviewed With: patient  Outcome Summary: Pt eager to ambulate this afternoon. He continues to exhibit festinating gait pattern with frequent cues for increased step length. Pt initially became dizzy after walking short distance and required seated rest break. Pt then became slightly agrumentative because he wanted to continue walking. After several minutes, pt did ambulate an additional 150 ft with no further complains of dizziness. Will continue to progress as tolerated.

## 2020-03-13 NOTE — PROGRESS NOTES
Continued Stay Note  [facility]     Patient Name: Ajith Suresh  MRN: [id number]  Today's Date: 3/13/2020    Admit Date: 3/9/2020    Discharge Plan     Row Name 03/13/20 0954       Plan    Plan  SNF    Patient/Family in Agreement with Plan  yes    Plan Comments  Recieved call from [facility]. They are now unable to accept. Outbound call to son Eulalio. He requested referrals to facilities in [place] and surrounding area. Declined CMS ratings. Multiple referrals made. [facility] and [place], [facility] facilities, [facility]. Notified liasions. Awaiting placement. Maggy Dobbs RN        Discharge Codes    No documentation.             Maggy Dobbs RN

## 2020-03-14 NOTE — SIGNIFICANT NOTE
03/14/20 1144   Rehab Treatment   Discipline physical therapy assistant   Reason Treatment Not Performed unable to treat, medical status change  (hold per nurse due to combative nature today)   Recommendation   PT - Next Appointment 03/15/20

## 2020-03-14 NOTE — PLAN OF CARE
Problem: Patient Care Overview  Goal: Plan of Care Review  Outcome: Ongoing (interventions implemented as appropriate)  Flowsheets (Taken 3/14/2020 0610)  Plan of Care Reviewed With: patient  Outcome Summary: Has been awake all shift. Remains noncompliant with calling out to get oob. Restless. remains in wrist and posey restraints for pt safety. VSS. Kehinde tf. Pt very unsteady. Constanly seeking out staff no distress     Problem: Patient Care Overview  Goal: Individualization and Mutuality  Outcome: Ongoing (interventions implemented as appropriate)     Problem: Patient Care Overview  Goal: Discharge Needs Assessment  Outcome: Ongoing (interventions implemented as appropriate)     Problem: Patient Care Overview  Goal: Interprofessional Rounds/Family Conf  Outcome: Ongoing (interventions implemented as appropriate)     Problem: Restraint, Nonbehavioral (Nonviolent)  Goal: Rationale and Justification  Outcome: Ongoing (interventions implemented as appropriate)  Flowsheets (Taken 3/14/2020 0610)  Rationale and Justification: failure of less restrictive safety measures; prevent harm to self; prevent line/tube removal     Problem: Restraint, Nonbehavioral (Nonviolent)  Goal: Nonbehavioral (Nonviolent) Restraint: Absence of Injury/Harm  Outcome: Ongoing (interventions implemented as appropriate)     Problem: Restraint, Nonbehavioral (Nonviolent)  Goal: Nonbehavioral (Nonviolent) Restraint: Achievement of Discontinuation Criteria  Outcome: Ongoing (interventions implemented as appropriate)     Problem: Restraint, Nonbehavioral (Nonviolent)  Goal: Nonbehavioral (Nonviolent) Restraint: Preservation of Dignity and Wellbeing  Outcome: Ongoing (interventions implemented as appropriate)

## 2020-03-14 NOTE — NURSING NOTE
Pt trying to get oob approx every 3-4 min without calling. Denies pain. Uncooperative with staff. ARNP notified order for posey vest obtained.

## 2020-03-14 NOTE — PLAN OF CARE
Problem: Patient Care Overview  Goal: Plan of Care Review  Outcome: Ongoing (interventions implemented as appropriate)  Flowsheets  Taken 3/13/2020 0625 by Antoinette Albarran, RN  Progress: no change  Taken 3/14/2020 0610 by Sania Boo, RN  Plan of Care Reviewed With: patient  Taken 3/14/2020 1544 by Camryn Mendes, RN  Outcome Summary: vss,  pt continues to attempt to remove his both his wrist and posey restraints, will continue to monitor

## 2020-03-14 NOTE — PROGRESS NOTES
"    DAILY PROGRESS NOTE  [facility]    Patient Identification:  Name: Ajith Suresh  Age: 74 y.o.  Sex: male  :  1945  MRN: [id number]         Primary Care Physician: Provider, No Known    Subjective:  Interval History: Patient Try to get out of bed every 3 to 4 minutes and was not following instructions and uncooperative with staff so patient placed in a Posey and soft restraints.  Ultimately would get these off as soon as we can but the patient is got a be able to be compliant.    Objective: No family at bedside.  Patient clinically looks stable.  I think he is more than capable of following commands correctly remove mentation standpoint    Scheduled Meds:  lansoprazole 30 mg Per G Tube BID AC     Continuous Infusions:     Vital signs in last 24 hours:  Temp:  [97.7 °F (36.5 °C)-98.9 °F (37.2 °C)] 97.8 °F (36.6 °C)  Heart Rate:  [61-85] 61  Resp:  [16-18] 16  BP: (122-150)/(66-80) 122/66    Intake/Output:    Intake/Output Summary (Last 24 hours) at 3/14/2020 1209  Last data filed at 3/14/2020 0847  Gross per 24 hour   Intake 390 ml   Output 1750 ml   Net -1360 ml       Exam:  /66 (BP Location: Right arm, Patient Position: Lying)   Pulse 61   Temp 97.8 °F (36.6 °C) (Oral)   Resp 16   Ht 177.8 cm (70\")   Wt 57.2 kg (126 lb 3.2 oz)   SpO2 99%   BMI 18.11 kg/m²     General Appearance:    Alert, cooperative, no distress, AAOx3                           Neck: Tracheostomy                         Lungs:    Clear to auscultation bilaterally, respirations unlabored                         Heart:    Regular rate and rhythm, S1 and S2 normal                  Abdomen:     Soft, non-tender, bowel sounds active, PEG                 extremities:   No cyanosis or edema                        Pulses:   Pulses palpable in lower extremities                               Data Review:  Labs in chart were reviewed.    Assessment:  Active Hospital Problems    Diagnosis  POA   • **Iron deficiency " anemia [D50.9]  Yes   • Iron deficiency anemia due to chronic blood loss [D50.0]  Unknown   • Dysphagia [R13.10]  Yes   • Hypokalemia [E87.6]  Yes   • Falls frequently [R29.6]  Not Applicable   • Underweight BMI 18.1 [R63.6]  Yes   • Abdominal pain [R10.9]  Yes      Resolved Hospital Problems   No resolved problems to display.       Plan:    Transfused 2 units packed red blood cells on 3/11/2020 hemoglobin as actually dropped from 8.1-7.8 and gave an additional 2 units on 3/12/2020 with hemoglobin trended up to 9.9-10.2              -EGD demonstrated multiple ulcers and Pepcid has been exchanged out for twice daily PPI              -Tolerating tube feeds        Routine care for tracheostomy     Disposition -ultimately CCP is working on disposition -already compounded by his past medical legal history now further compounded by need for soft restraints as well as Kitty Cespedes MD  3/14/2020  12:09

## 2020-03-15 NOTE — PROGRESS NOTES
"DAILY PROGRESS NOTE  [facility]    Patient Identification:  Name: Ajith Suresh  Age: 74 y.o.  Sex: male  :  1945  MRN: [id number]         Primary Care Physician: Provider, No Known    Subjective: patient is sitting up in a chair; asking for coffee  Interval History: follow up for anemia, underweight, dysphagia, hypertension    Objective:    Scheduled Meds:  lansoprazole 30 mg Per G Tube BID AC     Continuous Infusions:     Vital signs in last 24 hours:  Temp:  [97.3 °F (36.3 °C)-98.4 °F (36.9 °C)] 97.5 °F (36.4 °C)  Heart Rate:  [55-86] 55  Resp:  [16-18] 16  BP: (123-154)/(63-88) 131/63    Intake/Output:    Intake/Output Summary (Last 24 hours) at 3/15/2020 1455  Last data filed at 3/15/2020 1310  Gross per 24 hour   Intake 780 ml   Output 875 ml   Net -95 ml       Exam:  /63 (BP Location: Left arm, Patient Position: Sitting)   Pulse 55   Temp 97.5 °F (36.4 °C) (Oral)   Resp 16   Ht 177.8 cm (70\")   Wt 57.2 kg (126 lb 3.2 oz)   SpO2 93%   BMI 18.11 kg/m²     General Appearance:    Alert, cooperative, no distress, AAOx3; chronically ill-appearing                          Head:    Normocephalic, without obvious abnormality, atraumatic                           Eyes:    PERRL, conjunctiva/corneas clear, EOM's intact, both eyes                         Throat:   Lips, tongue, gums normal; oral mucosa pink and moist                           Neck:   Supple, symmetrical, trachea midline, no JVD                         Lungs:    Decreased breath sounds bilaterally, respirations unlabored                 Chest Wall:    No tenderness or deformity                          Heart:    Regular rate and rhythm, S1 and S2 normal, no murmur,no  Rub                                      or gallop                  Abdomen:     Soft, non-tender, bowel sounds active, no masses, no                                                        organomegaly                  Extremities:   Extremities normal, " atraumatic, no cyanosis or edema                        Pulses:   Pulses palpable in all extremities                            Skin:   Skin is warm and dry,  no rashes or palpable lesions                  Neurologic:   CNII-XII intact, motor strength grossly intact, sensation grossly                                         intact to light touch, no focal deficits noted       Data Review:  Labs in chart were reviewed.  WBC   Date Value Ref Range Status   03/14/2020 5.75 3.40 - 10.80 10*3/mm3 Final     Hemoglobin   Date Value Ref Range Status   03/14/2020 10.2 (L) 13.0 - 17.7 g/dL Final     Hematocrit   Date Value Ref Range Status   03/14/2020 33.3 (L) 37.5 - 51.0 % Final     Platelets   Date Value Ref Range Status   03/14/2020 319 140 - 450 10*3/mm3 Final     Sodium   Date Value Ref Range Status   03/14/2020 136 136 - 145 mmol/L Final     Potassium   Date Value Ref Range Status   03/14/2020 3.5 3.5 - 5.2 mmol/L Final     Chloride   Date Value Ref Range Status   03/14/2020 99 98 - 107 mmol/L Final     CO2   Date Value Ref Range Status   03/14/2020 25.3 22.0 - 29.0 mmol/L Final     BUN   Date Value Ref Range Status   03/14/2020 16 8 - 23 mg/dL Final     Creatinine   Date Value Ref Range Status   03/14/2020 0.41 (L) 0.76 - 1.27 mg/dL Final     Glucose   Date Value Ref Range Status   03/14/2020 73 65 - 99 mg/dL Final     Calcium   Date Value Ref Range Status   03/14/2020 9.0 8.6 - 10.5 mg/dL Final     No results found for: AST, ALT, ALKPHOS  No results found for: APTT, INR  No results found for: COLORU, CLARITYU, SPECGRAV, PHUR, PROTEINUR, GLUCOSEU, KETONESU, BLOODU, NITRITE, LEUKOCYTESUR, BILIRUBINUR, UROBILINOGEN, RBCUA, WBCUA, BACTERIA, UACOMMENT  Patient Active Problem List   Diagnosis Code   • Iron deficiency anemia D50.9   • Falls frequently R29.6   • Underweight BMI 18.1 R63.6   • Abdominal pain R10.9   • Dysphagia R13.10   • Hypokalemia E87.6   • Iron deficiency anemia due to chronic blood loss D50.0        Assessment:    Iron deficiency anemia    Falls frequently    Underweight BMI 18.1    Abdominal pain    Dysphagia    Hypokalemia    Iron deficiency anemia due to chronic blood loss      Plan:  Will continue to monitor  Recheck labs in the am  Disposition is pending  Potassium is still on the low side  Add scheduled doses per peg  D.w patient   Medium risk  Labs and notes reviewed    Nkechi Bear MD  3/15/2020  14:55

## 2020-03-15 NOTE — PLAN OF CARE
Problem: Patient Care Overview  Goal: Plan of Care Review  Outcome: Ongoing (interventions implemented as appropriate)  Flowsheets (Taken 3/15/2020 4595)  Progress: improving  Plan of Care Reviewed With: patient  Outcome Summary: pt restraint free since 2320, 1:1 sitter at bs, pt cooperative, following directions,frequent oral care, trach care done last pm & tolerated well; VSS, NSR, note left for MD regarding date of last BM 3/11 & no prn's ordered.; cont safety precautions, await placement to SNF, CCP ongoing
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
## 2020-03-16 NOTE — PROGRESS NOTES
Continued Stay Note  [facility]     Patient Name: Ajith Suresh  MRN: [id number]  Today's Date: 3/16/2020    Admit Date: 3/9/2020    Discharge Plan     Row Name 03/16/20 1127       Plan    Plan  Home with Home Health    Patient/Family in Agreement with Plan  yes    Plan Comments  Outbound call to son Eulalio. Discussed DC plan. Patient ambulating 150 ft with therapy. No accepting facility at this time. Son agreeable to Dc home with . Referrals made to [facility] and [facility]. Faxed facesheet to [facility] for PCP (and left ) as patient does not have one. Awaiting response. Plan will be to DC home via  EMS once PCP and Home Health can be established. Maggy Dobbs RN        Discharge Codes    No documentation.             Maggy Dobbs RN

## 2020-03-16 NOTE — PROGRESS NOTES
Continued Stay Note  [facility]     Patient Name: Ajith Suresh  MRN: [id number]  Today's Date: 3/16/2020    Admit Date: 3/9/2020    Discharge Plan     Row Name 03/16/20 8828       Plan    Plan Comments  Left another  with [facility]. Tried multiple numbers and unable to reach a person. Only able to reach recordings. No response at this time. Maggy Dobbs RN    Row Name 03/16/20 8382       Plan    Plan  Home with Home Health    Patient/Family in Agreement with Plan  yes    Plan Comments  Outbound call to son Eulalio. Discussed DC plan. Patient ambulating 150 ft with therapy. No accepting facility at this time. Son agreeable to Dc home with HH. Referrals made to [facility] and [facility]. Faxed facesheet to [facility] for PCP (and left ) as patient does not have one. Awaiting response. Plan will be to DC home via  EMS once PCP and Home Health can be established. Maggy Dobbs RN        Discharge Codes    No documentation.             Maggy Dobbs RN

## 2020-03-16 NOTE — PLAN OF CARE
Problem: Patient Care Overview  Goal: Plan of Care Review  Outcome: Ongoing (interventions implemented as appropriate)  Flowsheets (Taken 3/16/2020 1426)  Progress: improving  Plan of Care Reviewed With: patient  Outcome Summary: Pt tolerated treatment with no complaints. Pt is Hema for STS transfers. VCs for pt to lean forward as pt will posteriorly lean upon standing. Pt increased ambulation distance. Pt ambulated 300ft with rwx, but required CGAX2/Hema. Cues for pt to take bigger steps to increase stride length and improving festinating gait pattern. Pt is unsteady with ambulation and would continue to benefit from skilled PT to improve strength and balance to decrease falls risk.  Will continue to progress pt as able.

## 2020-03-16 NOTE — PROGRESS NOTES
Continued Stay Note  [facility]     Patient Name: Ajith Suresh  MRN: [id number]  Today's Date: 3/16/2020    Admit Date: 3/9/2020    Discharge Plan     Row Name 03/16/20 3679       Plan    Plan Comments  DC orders noted. Outbound call to [facility] . They have accepted the patient. Discussed with [facility] difficulty getting [facility] to return call on patient. She stated they have a liasion that deals specifically with [facility]. She will check with the liasion to get in touch with [facility] to return me phone call. Maggy Dobbs RN    Row Name 03/16/20 7802       Plan    Plan Comments  Outbound call to [facility] is discuss patient. Can not accept unitl patient has been sitter free for at least 24 hours and unable if they will accept then d/t clinical information. Maggy Dobbs RN    Row Name 03/16/20 5388       Plan    Plan Comments  Left another VM with [facility]. Tried multiple numbers and unable to reach a person. Only able to reach recordings. No response at this time. Maggy Dobbs RN        Discharge Codes    No documentation.       Expected Discharge Date and Time     Expected Discharge Date Expected Discharge Time    Mar 16, 2020             Maggy Dobbs RN

## 2020-03-16 NOTE — PROGRESS NOTES
Continued Stay Note  [facility]     Patient Name: Ajith Suresh  MRN: [id number]  Today's Date: 3/16/2020    Admit Date: 3/9/2020    Discharge Plan     Row Name 03/16/20 2220       Plan    Plan  Home with [facility]     Patient/Family in Agreement with Plan  yes    Plan Comments  Discussed with managers regarding in home therapy and [facility] was suggested. Referral placed to [facility]. They are able to accept. Michael is scheduled for  at 6pm. Notified son of  and he will make sure the house is unlocked so the patient can get in. Nurse made awareof . Maggy Dobbs RN     Row Name 03/16/20 5972       Plan    Plan Comments  DC orders noted. Outbound call to [facility] . They have accepted the patient. Discussed with [facility] difficulty getting [facility] to return call on patient. She stated they have a liasion that deals specifically with [facility]. She will check with the liasion to get in touch with [facility] to return me phone call. Maggy Dobbs RN    Row Name 03/16/20 7823       Plan    Plan Comments  Outbound call to [facility] is discuss patient. Can not accept unitl patient has been sitter free for at least 24 hours and unable if they will accept then d/t clinical information. Maggy Dobbs RN        Discharge Codes    No documentation.       Expected Discharge Date and Time     Expected Discharge Date Expected Discharge Time    Mar 16, 2020             Maggy Dobbs RN
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
## 2020-03-16 NOTE — PLAN OF CARE
Problem: Patient Care Overview  Goal: Plan of Care Review  Outcome: Ongoing (interventions implemented as appropriate)  Flowsheets (Taken 3/16/2020 9589)  Outcome Summary: Vital signs as recorded. Not in any distress. Trach care done. Tube feedings given as ordered,zero residuals. Fall precautions enforced,sitter at the bedside. Patient still non-compliant and keep asking for  coffee. Monitored.

## 2020-03-16 NOTE — PROGRESS NOTES
Continued Stay Note  [facility]     Patient Name: Ajith Suresh  MRN: [id number]  Today's Date: 3/16/2020    Admit Date: 3/9/2020    Discharge Plan     Row Name 03/16/20 8711       Plan    Plan Comments  Outbound call to [facility] is discuss patient. Can not accept unitl patient has been sitter free for at least 24 hours and unable if they will accept then d/t clinical information. Maggy Dobbs RN    Row Name 03/16/20 8020       Plan    Plan Comments  Left another  with [facility]. Tried multiple numbers and unable to reach a person. Only able to reach recordings. No response at this time. Maggy Dobbs RN    Row Name 03/16/20 112       Plan    Plan  Home with Home Health    Patient/Family in Agreement with Plan  yes    Plan Comments  Outbound call to anahy Tsai. Discussed DC plan. Patient ambulating 150 ft with therapy. No accepting facility at this time. Son agreeable to Dc home with . Referrals made to [facility] and [facility]. Faxed facesheet to [facility] for PCP (and left ) as patient does not have one. Awaiting response. Plan will be to DC home via  EMS once PCP and Home Health can be established. Maggy Dobbs RN        Discharge Codes    No documentation.             Maggy Dobbs RN

## 2020-03-16 NOTE — PLAN OF CARE
Problem: Patient Care Overview  Goal: Plan of Care Review  Flowsheets (Taken 3/16/2020 7108)  Progress: improving  Plan of Care Reviewed With: patient  Outcome Summary: denies pain, denies nausea, colby tube feeds, vitals stable, denies soa

## 2020-03-17 NOTE — PROGRESS NOTES
Case Management Discharge Note      Final Note: Patient DC'd home with MARCY in home            Home Medical Care      Service Provider Request Status Selected Services Address Phone Number Fax Number    [facility] Selected Home Health Services [address] [place], [place] [id number] [phone] --              Transportation Services  W/C Van: [facility]    Final Discharge Disposition Code: 06 - home with home health care

## 2020-03-17 NOTE — OUTREACH NOTE
Prep Survey      Responses   Sabianism facility patient discharged from?  [facility]   Is LACE score < 7 ?  No   Eligibility  Readm Mgmt   Discharge diagnosis  Anemia    Does the patient have one of the following disease processes/diagnoses(primary or secondary)?  Other   Does the patient have Home health ordered?  Yes   What is the Home health agency?   [facility]    Is there a DME ordered?  No   Prep survey completed?  Yes          Teresa Urias RN

## 2020-03-18 NOTE — ED NOTES
EMS reports picking the patient up from home for weakness. Patient denies having any pain. Patient was d/c 2 days ago from here.      Ita Bach RN  03/18/20 1715
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
## 2020-03-18 NOTE — ED NOTES
"According to EMS, pt has been sitting in urine for the past 5 days. Pt states \"I haven't been able to get up and walk, so I have just been going on myself.\" Pt changed out of saturated clothes and put in gown with blanket.     Ganga Weathers  03/18/20 1901    "

## 2020-03-18 NOTE — ED NOTES
Pt states that he has been weak for the past month.  States for the past five days he has not been able to walk.   Pt has an abrasion on the right elbow. States he fell yesterday.  Pt states that his wife usually takes care of him but she was recently admitted to the hospital and he has been living on his own.        Jass Bhatt, SOSA  03/18/20 6402

## 2020-03-19 PROBLEM — E87.1 HYPONATREMIA: Status: ACTIVE | Noted: 2020-01-01

## 2020-03-19 NOTE — PROGRESS NOTES
Continued Stay Note  [facility]     Patient Name: Ajith Suresh  MRN: [id number]  Today's Date: 3/19/2020    Admit Date: 3/18/2020    Discharge Plan     Row Name 03/19/20 1607       Plan    Plan Comments  Continued from previous note--CCP will follow up with patient's son to re-confirm PCP, how the patient gets to appointments, and if the son would be able to order the feeding tube supplies; and follow for PT eval. Anabel WASHBURN            Plan    Plan     Plan Comments         Discharge Codes    No documentation.             CLAIRE Carmen

## 2020-03-19 NOTE — PROGRESS NOTES
Late entry- 0730- [facility] here to discuss medical necessity for patient transport. Based on information received from Marino lino RN and notes from CCP Luann Trent, it appears patient could go home via w/c. Reviewed patients medical record- requested primary RN attempt to ambulate patient. After further review of record and patient unsteadiness w/ambulation- discussed with Tigist Lopez in the CCP Dept and further advised ANIA Vivar of patient abnormal test results and unsafe d/c plan. ERMD will be placing call for admission. Nely Rosario RN

## 2020-03-19 NOTE — PROGRESS NOTES
Continued Stay Note  [facility]     Patient Name: Ajith Suresh  MRN: [id number]  Today's Date: 3/19/2020    Admit Date: 3/18/2020    Discharge Plan     Row Name 03/19/20 1622       Plan    Plan  Follow up for HH agency and with spouse     Plan Comments  CCP spoke to patient's son Eulalio who stated the patient has not been to any doctor appointment since 2017 and does not have a PCP. Patient's son stated it needs to be confirmed with the spouse where the patient gets his feeding supplies because he believes it is a re-curring order. CCP spoke to Nilton who is reaching out to liaison for [facility]. CCP will follow up with the patient to discuss risk of returning home, HH agency selection, and to discuss feeding supplies with spouse. CCP will need to assist with transportation home. Anabel WASHBURN                                               Discharge Codes    No documentation.             CLAIRE Carmen
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
## 2020-03-19 NOTE — CONSULTS
Adult Nutrition  Assessment/PES    Patient Name:  Ajith Suresh  YOB: 1945  MRN: [id number]  Admit Date:  3/18/2020    Assessment Date:  3/19/2020    Comments:  Consult for TF    Resumed pt's TF regimen he was on prior to being d/c'd last week:    Impact Peptide 1.5 240 cc x 5 per day + 155 cc water flushes q feedings via PEG.    Will need to run TF by pump over an hour - we do not carry cans of this product to manually bolus or gravity drip. RD to f/u 3/20.    Reason for Assessment     Row Name 03/19/20 1404          Reason for Assessment    Reason For Assessment  nurse/nurse practitioner consult;TF/PN     Diagnosis  metabolic state hyponatremia     Identified At Risk by Screening Criteria  difficulty chewing/swallowing;unintentional loss of 10 lbs or more in the past 2 mos             Labs/Tests/Procedures/Meds     Row Name 03/19/20 1513          Labs/Procedures/Meds    Lab Results Reviewed  reviewed     Lab Results Comments  Na 123, BUn 25        Diagnostic Tests/Procedures    Diagnostic Test/Procedure Reviewed  reviewed        Medications    Pertinent Medications Reviewed  reviewed         Physical Findings     Row Name 03/19/20 1513          Physical Findings    Gastrointestinal  feeding tube     Tubes  PEG         Estimated/Assessed Needs     Row Name 03/19/20 1514          Calculation Measurements    Weight Used For Calculations  57.2 kg (126 lb 1.7 oz)        Estimated/Assessed Needs    Additional Documentation  Fluid Requirements (Group);Protein Requirements (Group);KCAL/KG (Group)        KCAL/KG    KCAL/KG  30 Kcal/Kg (kcal);35 Kcal/Kg (kcal)     30 Kcal/Kg (kcal)  1716     35 Kcal/Kg (kcal)  2002        Protein Requirements    Weight Used For Protein Calculations  57.2 kg (126 lb)     Est Protein Requirement Amount (gms/kg)  1.2 gm protein     Estimated Protein Requirements (gms/day)  68.58        Fluid Requirements    Estimated Fluid Requirements (mL/day)  1700     Estimated Fluid  Requirement Method  RDA Method     RDA Method (mL)  1700     Pablo-Rubi Method (over 20 kg)  2644         Nutrition Prescription Ordered     Row Name 03/19/20 1514          Nutrition Prescription PO    Current PO Diet  NPO                 Problem/Interventions:  Problem 1     Row Name 03/19/20 1514          Nutrition Diagnoses Problem 1    Problem 1  Needs Alternate Route               Intervention Goal     Row Name 03/19/20 1514          Intervention Goal    General  Nutrition support treatment     TF/PN  Inititiate TF/PN;Tolerate TF at goal         Nutrition Intervention     Row Name 03/19/20 1514          Nutrition Intervention    RD/Tech Action  Recommend/ordered     Recommended/Ordered  EN         Nutrition Prescription     Row Name 03/19/20 1514          Nutrition Prescription EN    Enteral Prescription  Enteral begin/change;Enteral to supply     Enteral Route  PEG     Product  Impact Peptide 1.5 heather     TF Delivery Method  Bolus     TF Bolus Goal Volume (mL)  240 mL     TF Bolus Frequency  5 times a day     TF Bolus Cycle Over  Gravity     Water flush (mL)   155 mL     Water Flush Frequency  Every feeding     New EN Prescription Ordered?  Yes        EN to Supply    Kcal/Day  1800 Kcal/Day     Protein (gm/day)  112 gm/day     TF Free H2O (mL)  932 mL     Total Free H2O (mL/day)  1707 mL/day         Education/Evaluation     Row Name 03/19/20 1515          Monitor/Evaluation    Monitor  Per protocol           Electronically signed by:  Estella Sotomayor RD  03/19/20 15:16

## 2020-03-19 NOTE — PLAN OF CARE
Problem: Patient Care Overview  Goal: Plan of Care Review  Outcome: Ongoing (interventions implemented as appropriate)  Flowsheets  Taken 3/19/2020 1817  Progress: no change  Outcome Summary: Admitted to [facility] from ED, very demanding and agitated upon arrival, started tube feeds, sitter at bedside, now more cooperative. Fall precautions in place, nephrology consulted.  Taken 3/19/2020 1132  Plan of Care Reviewed With: patient

## 2020-03-19 NOTE — ED NOTES
Pt given a 240 mL bolus feeding of Jevity tube feeding and flushed with sterile water per previous feeding order.      Kt Shanks, RN  03/19/20 0702

## 2020-03-19 NOTE — PROGRESS NOTES
Spoke at length with son, Eulalio Suresh, and informed that there was no medical reason to admit pt to hospital. Discussed several options with pt re discharge plans. Son refused information on private duty sitters. Son stated that his father cannot go to any rehab facility due to past medical history. Informed son that the ER is not an appropriate alternative when his father is home alone. Suggested that family hold a meeting and discuss how they will provide care for their father. Eulalio stated that they all work and that they cannot take care of him all the time. Informed Eullaio that his father was discharged and will arriving home via [facility] ambulance service. Luann Trent RN

## 2020-03-19 NOTE — DISCHARGE INSTRUCTIONS
Make sure you are drinking a normal enough fluid.  Follow-up with your primary care doctor as needed.  Return to emergency department for worsening symptoms or other concern.

## 2020-03-19 NOTE — ED NOTES
Pt sitting on the edge of the bed with his gown mostly off and all his monitors removed. Pt instructed to return to bed and educated on fall precautions. Call light in reach. Salomón SAMANO, ED Tech assisting pt back into gown and placing him back on monitors. Bed alarm placed on pt.     Kendra Streeter RN  03/19/20 0109

## 2020-03-19 NOTE — DISCHARGE PLACEMENT REQUEST
"Ajith Adam (74 y.o. Male)     Date of Birth Social Security Number Address Home Phone MRN    1945  [address] [facility] [address] [phone] [id number]    [employer] Marital Status          None        Admission Date Admission Type Admitting Provider Attending Provider Department, Room/Bed    3/18/20 Emergency Nkechi Bear MD Stingl, Renate Andrea, MD [address], P483/1    Discharge Date Discharge Disposition Discharge Destination                       Attending Provider:  Nkechi Bear MD    Allergies:  No Known Allergies    Isolation:  None   Infection:  None   Code Status:  Prior    Ht:  177.8 cm (70\")   Wt:  57.2 kg (126 lb)    Admission Cmt:  None   Principal Problem:  None                Active Insurance as of 3/18/2020     Primary Coverage     Payor Plan Insurance Group Employer/Plan Group    MEDICARE MEDICARE A & B      Payor Plan Address Payor Plan Phone Number Payor Plan Fax Number Effective Dates    [address] [phone]  12/1/2012 - None Entered    [place] [address]       Subscriber Name Subscriber Birth Date Member ID       AJITH ADAM 1945 [id number]                 Emergency Contacts      (Rel.) Home Phone Work Phone Mobile Phone    Eulalio Aadm (Son) [phone] -- --    JAIRKEVAN (Spouse) [phone] -- --              "

## 2020-03-19 NOTE — ED NOTES
Pt given a 240 mL bolus feeding of Jevity tube feeding and flushed with sterile water per Dr. Loving's approval.     Kendra Streeter, RN  03/19/20 0110

## 2020-03-19 NOTE — OUTREACH NOTE
Medical Week 1 Survey      Responses   [facility] patient discharged from?  [place]   Does the patient have one of the following disease processes/diagnoses(primary or secondary)?  Other   Is there a successful TCM telephone encounter documented?  No   Week 1 attempt successful?  No   Revoke  Readmitted          Sania Kahn RN
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
## 2020-03-19 NOTE — ED NOTES
"Pt requesting staff to \"put the applesauce in my tube or some milk.\" Pt informed we can not put applesauce or milk through a g-tube. Luann POE RN assisting staff to get pt tube feeding for a bolus per Dr. Loving.     Kendra Streeter, RN  03/19/20 0006    "

## 2020-03-19 NOTE — ED PROVIDER NOTES
Turnover note    8:09 AM  Patient had been seen earlier this evening by by partner Gordo Quintanilla.  Plan was for him to go home with a diagnosis of generalized weakness and hyponatremia.  Patient has been really unable to get up and care for himself and his wife is now hospitalized in the [facility] after recent suicide attempt.  [facility] nurses feel that patient is unable to go home and he cannot be placed in a facility as he is a registered sex offender.  At this point given his generalized weakness with lack of caregiver would hospitalize for treatment of hyponatremia and hope that his wife is able to recover from the [facility] she can get back to home and take care of him.    Disposition- admission    8:41 AM  I discussed patient's evaluation and treatment with Dr. Conley who will admit for [facility].     Ronnie Erazo MD  03/19/20 0830

## 2020-03-19 NOTE — PROGRESS NOTES
APS report completed due to home alone without family support. Faxed to all appropriate numbers. Luann Trent RN

## 2020-03-20 NOTE — PROGRESS NOTES
Adult Nutrition  Assessment/PES    Patient Name:  Ajith Suresh  YOB: 1945  MRN: [id number]  Admit Date:  3/18/2020    Assessment Date:  3/20/2020    TF follow up:    TF started yesterday with Impact peptide 1.5, 240 cc bolus infusion by pump x 5 daily with 155 cc water flushes q feeding. Tolerating well. Na 139 today.    RD following.       Electronically signed by:  Estella Sotomayor RD  03/20/20 11:05

## 2020-03-20 NOTE — PROGRESS NOTES
Called to see patient for dislodged G-tube, has had tube for years. Placed 16 Jamaican waller without difficulty, aspirated gastric content, flushed easily.
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
## 2020-03-20 NOTE — PLAN OF CARE
Vital signs stable. Room air. Sitter at bedside. Patient impulsive. Frequently requests coffee from staff. Tube feeding per order. Await AM labs. Will continue to monitor.
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
## 2020-03-20 NOTE — NURSING NOTE
Access center follow up:    Pt sitting in chair watching TV with 1:1 staff at bedside, related to impulsive behavior.  Pt has no concerns about self at this time, but did ask about wife, who is also hospitalized at this time.  Pt to be discharged home with home health per  note.  Access will follow.
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
## 2020-03-20 NOTE — PROGRESS NOTES
Continued Stay Note  [facility]     Patient Name: Ajith Suresh  MRN: [id number]  Today's Date: 3/20/2020    Admit Date: 3/18/2020    Discharge Plan     Row Name 03/20/20 1300       Plan    Plan  Home with [facility]    Plan Comments  [facility] placed web referral for [facility] after leaving 3 voicemails without returned calls. [facility] called and spoke to Crystal/[facility] ([phone]) who confirmed a referral was received previously from Maggy BERUMEN at [facility]. Patient's son Eulalio consented to services by phone and was emailed a new patient packet to fill out for the patient. After paperwork is received, patient will be scheduled with a provider. [facility] called patient's son Eulalio who confirmed he received the email and is working on the paperwork. Eulalio stated he would scan it back to them so an appointment can be scheduled for the patient. Daniella from [facility] also stated a referral was received from Elenita/LINDSE[facility] and they have all information needed for the patient. Anabel Talavera CRISTIAN         Discharge Codes    No documentation.             CLAIRE Carmen
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
## 2020-03-21 NOTE — PLAN OF CARE
Problem: Patient Care Overview  Goal: Plan of Care Review  Outcome: Ongoing (interventions implemented as appropriate)  Flowsheets (Taken 3/21/2020 0501)  Progress: no change  Plan of Care Reviewed With: patient  Outcome Summary: Patient was medicated with PRN Seroquel per his request. Sitter at bedside. Patient is demanding, impulsive and anxious. Patient is on bolus tube feeds. Patient pulled his IV last night. New IV in place. No complaints of pain. Will continue to monitor vital signs, labs and comfort.

## 2020-03-21 NOTE — PLAN OF CARE
Problem: Patient Care Overview  Goal: Plan of Care Review  Outcome: Ongoing (interventions implemented as appropriate)  Flowsheets (Taken 3/20/2020 1840)  Progress: no change  Plan of Care Reviewed With: patient  Outcome Summary: Pt has been very impulsive throughout shift, demanding and agitation continues. He has a sitter at bedside. Was asking for coffee to be put in his PEG tube. Explained it is against policy to do that and the MD would not order it. TF were started late due to schedule mix up on previous shift. Pt wants to walk but not accepting that sitter can not manage his IV pole, him , walker and a chair all at the same time. Pt was up to chair most of afternoon. Was given seroquel for agitation at 3pm and slept for about 40 mins. Pt awoke and demanded to walk and put the foot of chair down w/o listening to sitter to wait and got up which pulled his PEG tube out completely. Dr. Sainz consulted and came and replaced with 16Fr FC and I placed new stop cock on and verified placement. TF rate is 240mL/hr from 6a-10pm Daily of Peptide 1.5 and 155mL Flushes q 4hr. Pt tolerating well. Pt repeated request for coffee to Unit Mgr and she suggested swabs with coffee. As long as they are rung out pt is tolerating ok. No c/o pain. Will continue to monitor and treat per MD orders and POC.

## 2020-03-21 NOTE — NURSING NOTE
Access Center follow-up.  Per RN, patient has been cooperative with no agitation/aggression and has not required PRN Seroquel this shift.  Upon entering room, patient is awake, resting in bed, watching TV, sitter at bedside.  He is irritated regarding tube feeds, requesting them to be continuous (RN aware), he also stated being upset with physicians saying they are changing things without talking to him first.  He stated he is mad at the doctors, could not state any specific physician and denied plan to harm anyone.  He denied SI.    AC following.

## 2020-03-21 NOTE — PROGRESS NOTES
: the pt. With out complains , the chart reviewed    Vital Signs  Vitals:    03/21/20 0930   BP: 110/59   Pulse: 62   Resp: 18   Temp:    SpO2: 100%     I/O this shift:  In: -   Out: 150 [Urine:150]  I/O last 3 completed shifts:  In: 3602 [I.V.:600; Other:936; NG/GT:2066]  Out: 2300 [Urine:2300]      Physical Exam:    General:in and  HEENT:  Normo cephalic, Atraumatic, EOMI, PERRLA, NO icterus,  Neck:  Supple, No JVD, No Carotid artery bruit, No lymphadenopathy  Chest: Clear to auscultation bilaterally,   Cardiovascular: Regular rate and rhythm. Positive S1 & S2, No rub, murmur or gallop.  Abdominal: Soft, non tender, no palpable organomegaly, no abdominal bruit, positive bowel sounds  Musculoskeletal: No joint tenderness or swelling, good range of motion, Adequate muscle strength on both sides, no cyanosis, clubbing or edema on lower extremities.  Neuro: Alert oriented x3, CN1 - 12 intact, No focal sensory or motor deficit.      Review of Systems:       Current Labs  [unfilled]  Lab Results (last 24 hours)     Procedure Component Value Units Date/Time    Basic Metabolic Panel [[id number]]  (Abnormal) Collected:  03/21/20 0517    Specimen:  Blood Updated:  03/21/20 0640     Glucose 88 mg/dL      BUN 23 mg/dL      Creatinine 0.39 mg/dL      Sodium 135 mmol/L      Potassium 4.1 mmol/L      Chloride 103 mmol/L      CO2 25.4 mmol/L      Calcium 8.9 mg/dL      eGFR Non African Amer >150 mL/min/1.73      BUN/Creatinine Ratio 59.0     Anion Gap 6.6 mmol/L     Narrative:       GFR Normal >60  Chronic Kidney Disease <60  Kidney Failure <15      Sodium [[id number]]  (Normal) Collected:  03/20/20 1148    Specimen:  Blood Updated:  03/20/20 1219     Sodium 136 mmol/L         Current Radiology  Imaging Results (Last 24 Hours)     ** No results found for the last 24 hours. **        Current Meds    Current Facility-Administered Medications:   •  lansoprazole (FIRST) oral suspension 30 mg, 30 mg, Oral, BID AC, Stingl, Nkechi  MD Arnoldo, 30 mg at 03/21/20 0639  •  QUEtiapine (SEROquel) tablet 25 mg, 25 mg, Per G Tube, Q6H PRN, Nkechi Bear MD, 25 mg at 03/21/20 0326  •  [COMPLETED] Insert peripheral IV, , , Once **AND** sodium chloride 0.9 % flush 10 mL, 10 mL, Intravenous, PRN, Jay Loving MD              Patient Active Problem List   Diagnosis   • Iron deficiency anemia   • Falls frequently   • Underweight BMI 18.1   • Abdominal pain   • Dysphagia   • Hypokalemia   • Iron deficiency anemia due to chronic blood loss   • Hyponatremia   chronically ill patient , hyponatremia better , will sign off  Call if any questions.      Brianna Callahan MD FACP, FASN.  03/21/20  11:38 AM

## 2020-03-22 NOTE — PLAN OF CARE
Problem: Patient Care Overview  Goal: Plan of Care Review  Outcome: Ongoing (interventions implemented as appropriate)  Flowsheets (Taken 3/22/2020 1704)  Progress: no change  Plan of Care Reviewed With: patient  Outcome Summary: Pt. was more comfortable today. He walked in his room and the hallway.  He slept some too.  Sitter at bedside.  Will continue to monitor.

## 2020-03-22 NOTE — PLAN OF CARE
Problem: Patient Care Overview  Goal: Plan of Care Review  Outcome: Ongoing (interventions implemented as appropriate)    No c/o pain or nausea. Tube feeds given. Sitter at bedside. Will cont. To monitor and follow current orders.

## 2020-03-22 NOTE — PLAN OF CARE
Problem: Patient Care Overview  Goal: Plan of Care Review  Outcome: Ongoing (interventions implemented as appropriate)  Flowsheets (Taken 3/22/2020 9549)  Progress: no change  Plan of Care Reviewed With: patient  Outcome Summary: Patient slept between care. Patient is on tube feeds and prefers continous tube feeding than bolus. Patient is very unsteady on his feet. Up with assist x2 to the toilet. Seroquel given x1 for agitation. Sitter at bedside. Will continue to monitor vital signs, labs and comfort.

## 2020-03-22 NOTE — PROGRESS NOTES
"DAILY PROGRESS NOTE  [facility]    Patient Identification:  Name: Ajith Suresh  Age: 74 y.o.  Sex: male  :  1945  MRN: [id number]         Primary Care Physician: Marino Thibodeaux MD    Subjective: patient is sleeping but awakens easily; wants to go home  Interval History: follow up for  Anemia, dysphagia, weakness    Objective:    Scheduled Meds:  lansoprazole 30 mg Oral BID AC     Continuous Infusions:     Vital signs in last 24 hours:  Temp:  [96.9 °F (36.1 °C)-98.4 °F (36.9 °C)] 97.2 °F (36.2 °C)  Heart Rate:  [55-84] 66  Resp:  [16-18] 16  BP: (107-129)/(62-76) 128/76    Intake/Output:    Intake/Output Summary (Last 24 hours) at 3/22/2020 1851  Last data filed at 3/22/2020 1743  Gross per 24 hour   Intake 2205 ml   Output 1375 ml   Net 830 ml       Exam:  /76 (BP Location: Left arm, Patient Position: Lying)   Pulse 66   Temp 97.2 °F (36.2 °C) (Oral)   Resp 16   Ht 177.8 cm (70\")   Wt 57.2 kg (126 lb)   SpO2 99%   BMI 18.08 kg/m²     General Appearance:    drowsy, cooperative, no distress, AAOx3                          Head:    Normocephalic, without obvious abnormality, atraumatic                           Eyes:    PERRL, conjunctiva/corneas clear, EOM's intact, both eyes                         Throat:   Lips, tongue, gums normal; oral mucosa pink and moist                           Neck:   Supple, symmetrical, trachea midline, no JVD; trach in place                         Lungs:    Decreased breath sounds bilaterally, respirations unlabored                 Chest Wall:    No tenderness or deformity                          Heart:    Regular rate and rhythm, S1 and S2 normal, no murmur,no  Rub                                      or gallop                  Abdomen:     Soft, non-tender, bowel sounds active, no masses,  Peg tube in place                 Extremities:   Extremities normal, atraumatic, no cyanosis or edema                        Pulses:   Pulses palpable in " all extremities                            Skin:   Skin is warm and dry,  no rashes or palpable lesions                  Neurologic:   CNII-XII intact, motor strength grossly intact, sensation grossly                                         intact to light touch, no focal deficits noted       Data Review:  Labs in chart were reviewed.  WBC   Date Value Ref Range Status   03/22/2020 5.14 3.40 - 10.80 10*3/mm3 Final     Hemoglobin   Date Value Ref Range Status   03/22/2020 10.1 (L) 13.0 - 17.7 g/dL Final     Hematocrit   Date Value Ref Range Status   03/22/2020 32.8 (L) 37.5 - 51.0 % Final     Platelets   Date Value Ref Range Status   03/22/2020 211 140 - 450 10*3/mm3 Final     Sodium   Date Value Ref Range Status   03/22/2020 133 (L) 136 - 145 mmol/L Final     Potassium   Date Value Ref Range Status   03/22/2020 4.4 3.5 - 5.2 mmol/L Final     Chloride   Date Value Ref Range Status   03/22/2020 102 98 - 107 mmol/L Final     CO2   Date Value Ref Range Status   03/22/2020 25.8 22.0 - 29.0 mmol/L Final     BUN   Date Value Ref Range Status   03/22/2020 30 (H) 8 - 23 mg/dL Final     Creatinine   Date Value Ref Range Status   03/22/2020 0.49 (L) 0.76 - 1.27 mg/dL Final     Glucose   Date Value Ref Range Status   03/22/2020 122 (H) 65 - 99 mg/dL Final     Calcium   Date Value Ref Range Status   03/22/2020 9.1 8.6 - 10.5 mg/dL Final     Magnesium   Date Value Ref Range Status   03/22/2020 2.1 1.6 - 2.4 mg/dL Final     Patient Active Problem List   Diagnosis Code   • Iron deficiency anemia D50.9   • Falls frequently R29.6   • Underweight BMI 18.1 R63.6   • Abdominal pain R10.9   • Dysphagia R13.10   • Hypokalemia E87.6   • Iron deficiency anemia due to chronic blood loss D50.0   • Hyponatremia E87.1       Assessment:    Hyponatremia  dysphagia  Anemia  weakness    Plan:  Hopefully home soon  caregiving situation needs to be explored  Wife was in the hospital  Monitor hgb  Home any time once his wife is back  aps is  involved  Medium risk  Nkechi Bear MD  3/22/2020  18:51

## 2020-03-22 NOTE — PROGRESS NOTES
[facility] did follow up with pt. Pt's nurse states pt is having a better day as he has been getting the PRN Seroquel. Nurse states pt has been pleasant and calm. Pt nodded that he was feeling ok and having a better day. Pt was calm and watching a movie. Access will follow.
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
## 2020-03-23 NOTE — PLAN OF CARE
Problem: Patient Care Overview  Goal: Plan of Care Review  Outcome: Ongoing (interventions implemented as appropriate)  Flowsheets (Taken 3/23/2020 0411)  Progress: no change  Plan of Care Reviewed With: patient  Outcome Summary: Awake most of the night. Some resltessness. Ambulated x2 in hallway which relieved the restlessness. Tube feeds continue with 0 residual. No complaints of pain. VSS. Sitter remains with patient. Continue to monitor.

## 2020-03-23 NOTE — PLAN OF CARE
Problem: Patient Care Overview  Goal: Plan of Care Review  Outcome: Ongoing (interventions implemented as appropriate)  Flowsheets (Taken 3/23/2020 1619)  Progress: improving  Plan of Care Reviewed With: patient  Outcome Summary: Pt has had a decent day. Ambulated multiple times in the hallway with walker and assist x 2. Tube feedings continuous at 60 mL. R/A. Trach in place. PEG tube LUQ. A/O. Sitter at bedside for safety - attempted to pull out PEG tube the other night     Problem: Patient Care Overview  Goal: Individualization and Mutuality  Outcome: Ongoing (interventions implemented as appropriate)     Problem: Patient Care Overview  Goal: Discharge Needs Assessment  Outcome: Ongoing (interventions implemented as appropriate)     Problem: Patient Care Overview  Goal: Interprofessional Rounds/Family Conf  Outcome: Ongoing (interventions implemented as appropriate)     Problem: Fall Risk (Adult)  Goal: Absence of Fall  Outcome: Ongoing (interventions implemented as appropriate)
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
## 2020-03-23 NOTE — PROGRESS NOTES
Discharge Planning Assessment  [facility]     Patient Name: Ajith Suresh  MRN: [id number]  Today's Date: 3/23/2020    Admit Date: 3/18/2020    Discharge Needs Assessment    No documentation.       Discharge Plan     Row Name 03/23/20 1420       Plan    Plan Comments  Received call Alysha Quintanilla/[id number] states  Medicare tube feeds covered at 100% and she is aware that the discharge plan is for tomorrow. Alysha/Mery Balderas will coordinate with Alysha/LINDSEY [facility]. VIRI Marshall RN, CCP.         Destination      Coordination has not been started for this encounter.      Durable Medical Equipment      Coordination has not been started for this encounter.      Dialysis/Infusion      Service Provider Request Status Selected Services Address Phone Number Fax Number    [facility] [address] [address] [phone]      Home Medical Care      Service Provider Request Status Selected Services Address Phone Number Fax Number    [facility] Accepted N/A [address] [phone] --    [facility] [address] [address] [phone]      Therapy      Coordination has not been started for this encounter.      Community Resources      Coordination has not been started for this encounter.          Demographic Summary    No documentation.       Functional Status    No documentation.       Psychosocial    No documentation.       Abuse/Neglect    No documentation.       Legal    No documentation.       Substance Abuse    No documentation.       Patient Forms    No documentation.           Gris Marshall RN

## 2020-03-23 NOTE — PROGRESS NOTES
Adult Nutrition  Assessment/PES    Patient Name:  Ajith Suresh  YOB: 1945  MRN: [id number]  Admit Date:  3/18/2020    Assessment Date:  3/23/2020    Comments:  TF changed to continuous drip per pt preference. Rate 60 cc/hr of Impact peptide 1.5. Exceed needs for calories, protein if ran over 24 hours. Will leave rate as is for now in the case he wants it turned off for ADLs, ambulating, etc.     Reason for Assessment     Row Name 03/23/20 0957          Reason for Assessment    Reason For Assessment  follow-up protocol;TF/PN         Nutrition/Diet History     Row Name 03/23/20 0957          Nutrition/Diet History    Typical Food/Fluid Intake  NPO. Pt noted to prefer his TF to infuse continuously.            Labs/Tests/Procedures/Meds     Row Name 03/23/20 0901          Labs/Procedures/Meds    Lab Results Reviewed  reviewed     Lab Results Comments  Glu, BUN        Diagnostic Tests/Procedures    Diagnostic Test/Procedure Reviewed  reviewed        Medications    Pertinent Medications Reviewed  reviewed         Physical Findings     Row Name 03/23/20 0965          Physical Findings    Overall Physical Appearance  other (see comments) trach     Gastrointestinal  feeding tube     Tubes  PEG           Nutrition Prescription Ordered     Row Name 03/23/20 0925          Nutrition Prescription PO    Current PO Diet  NPO        Nutrition Prescription EN    Enteral Route  PEG     Product  Impact Peptide 1.5 (Pivot 1.5)     TF Delivery Method  Continuous     Continuous TF Goal Rate (mL/hr)  60 mL/hr     Water flush (mL)   155 mL     Water Flush Frequency  Other (comment) q 6 hr         Evaluation of Received Nutrient/Fluid Intake     Row Name 03/23/20 1000          Calories Evaluation    Enteral Calories (kcal)  2160     % of Kcal Needs  100        Protein Evaluation    Enteral Protein (gm)  136     % of Protein Needs  100        Intake Assessment    Energy/Calorie Requirement Assessment  exceeds needs      Protein Requirement Assessment  exceeds needs        Fluid Intake Evaluation    Enteral (Free Water) Fluid (mL)  1094     Free Water Flush Fluid (mL)  620     Total Free Water Intake (mL)  1714 mL        EN Evaluation    TF Residual  no residual     HOB  Greater than or equal to 30 degress           Problem/Interventions:    Intervention Goal     Row Name 03/23/20 1001          Intervention Goal    General  Maintain nutrition;Nutrition support treatment     TF/PN  Maintain TF/PN;Tolerate TF at goal         Nutrition Intervention     Row Name 03/23/20 1001          Nutrition Intervention    RD/Tech Action  Follow Tx progress;Care plan reviewd         Nutrition Prescription     Row Name 03/23/20 1001          Nutrition Prescription EN    Enteral Prescription  Continue same protocol         Education/Evaluation     Row Name 03/23/20 1001          Monitor/Evaluation    Monitor  Per protocol           Electronically signed by:  Estella Sotomayor RD  03/23/20 10:01

## 2020-03-23 NOTE — PLAN OF CARE
Problem: Nutrition, Enteral (Adult)  Goal: Signs and Symptoms of Listed Potential Problems Will be Absent, Minimized or Managed (Nutrition, Enteral)  Outcome: Ongoing (interventions implemented as appropriate)  Flowsheets (Taken 3/23/2020 1003)  Problems Present (Enteral Nutrition): none  Note:   Regimen changed to cont drip - 60 cc/hr - per pt preference.
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
## 2020-03-24 NOTE — PROGRESS NOTES
Continued Stay Note  [facility]     Patient Name: Ajith Suresh  MRN: [id number]  Today's Date: 3/24/2020    Admit Date: 3/18/2020    Discharge Plan     Row Name 03/24/20 1142       Plan    Plan  home with [facility]    Patient/Family in Agreement with Plan  yes    Plan Comments  Spoke with pt via phone and son Eulalio via phone. Eulalio reports his mother is being discharged today and will be home later this afternoon and she has a hernandez. Eulalio's wife is going to transport her home however is unable to transport pt. Eulalio works until 6:00 and can check on pt when he gets off. Pt will need wheelchair van for transport home at d/c. CCP to follow for needs. MADDISON Schuler        Discharge Codes    No documentation.             CLAIRE Brock

## 2020-03-24 NOTE — PROGRESS NOTES
Continued Stay Note  [facility]     Patient Name: Ajith Suresh  MRN: [id number]  Today's Date: 3/24/2020    Admit Date: 3/18/2020    Discharge Plan     Row Name 03/24/20 1304       Plan    Plan  home [facility]     Patient/Family in Agreement with Plan  yes    Plan Comments  Spoke with pt who is agreeable to wheelchair van spoke with manager Jennifer who approves to use voucher. Spoke with [employer] wheelchair van [phone] scheduled for 5:00. Voucher placed on chart. Informed Alysha/[facility] and [facility] of d/c today. MADDISON Schuler    Row Name 03/24/20 1140       Plan    Plan  home [facility]    Patient/Family in Agreement with Plan  yes    Plan Comments  Spoke with pt via phone and son Eulalio via phone. Eulalio reports his mother is being discharged today and will be home later this afternoon and she has a hernandez. Eulalio's wife is going to transport her home however is unable to transport pt. Eulalio works until 6:00 and can check on pt when he gets off. Pt will need wheelchair van for transport home at d/c. CCP to follow for needs. MADDISON Schuler        Discharge Codes    No documentation.             CLAIRE Brock
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
## 2020-03-24 NOTE — PROGRESS NOTES
Case Management Discharge Note      Final Note: Discharged to home with [facility] for tube feeds. [employer] provided the transportation to his home. VIRI ardon RN, CCP.     Provided Post Acute Provider List?: Refused  Refused Provider List Comment: Patient refused referrals and stated he is going home  Provided Post Acute Provider Quality & Resource List?: Refused  Refused Quality and Resource List Comment: Patient stated he wants [facility] and refused printout of Medicare.gov ratings    Destination      No service has been selected for the patient.      Durable Medical Equipment      No service has been selected for the patient.      Dialysis/Infusion - Selection Complete      Service Provider Request Status Selected Services Address Phone Number Fax Number    [facility] Selected Infusion and IV Therapy [address], [place] [address] [phone]      Home Medical Care - Selection Complete      Service Provider Request Status Selected Services Address Phone Number Fax Number    [facility] [address], [place] [address] [phone]      Therapy      No service has been selected for the patient.      Community Resources      No service has been selected for the patient.        Transportation Services  W/C Van: [employer])    Final Discharge Disposition Code: 06 - home with home health care
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
## 2020-03-24 NOTE — PLAN OF CARE
Problem: Patient Care Overview  Goal: Plan of Care Review  Outcome: Ongoing (interventions implemented as appropriate)  Flowsheets (Taken 3/24/2020 2864)  Progress: no change  Plan of Care Reviewed With: patient  Outcome Summary: Awake most of the night. Safety sitter remains at bedside. Frequent ambulation. Said he feels anxious and wants to go home. Requested soda into PEG tube, educated on not placing anything into tube that is not ordered. Continue to monitor.
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
## 2020-04-09 NOTE — PROGRESS NOTES
"Discussion with pt re reason for visit to the ER. Pt stated that he fell and that he wanted to have a \"bath\". Informed pt that the Er was not the place to come to have a bath. Pt stated his home health nurse sent him here. Confirmed with VNA, Alysha Vargas, that pt does not have home health services, as he has cursed out staff and refused to allow them in the door. Attempted to remind pt of uses for the ER, pt told this CCP nurse to \"Get the hell out of my room and get someone in here to give me a bath.\" Informed pt that he would not be receiving a bath in the ER. Luann Trent RN    "

## 2020-04-12 NOTE — DISCHARGE INSTRUCTIONS
Continue current home medications  Only place what you are supposed to put in your G-tube  Follow-up with your PMD as needed  Return to er for emergent issues only

## 2020-04-12 NOTE — ED PROVIDER NOTES
EMERGENCY DEPARTMENT ENCOUNTER    CHIEF COMPLAINT  Chief Complaint: Clogged G-tube  History given by: Patient  History limited by: Nothing  Room Number: 07/07  PMD: Marino Thibodeaux MD      HPI:  Pt is a 74 y.o. male who presents with clogged G-tube since earlier today.  Patient states he thinks that there are coffee grounds in the tubing.  Patient states that he made his coffee this morning placed that there is G-tube and now he has been able to use it since.  Patient denies any other complaints at this time.  Past Medical History of hypertension, hyperlipidemia, tracheostomy    Duration: Today  Timing: Constant  Location: G-tube  Radiation: Not applicable  Quality: Clogged G-tube  Intensity/Severity: Mild  Progression: Unchanged  Associated Symptoms: None  Aggravating Factors: None   Alleviating Factors: None  Previous Episodes: None  Treatment before arrival: None    PAST MEDICAL HISTORY  Active Ambulatory Problems     Diagnosis Date Noted   • Iron deficiency anemia 03/09/2020   • Falls frequently 03/09/2020   • Underweight BMI 18.1 03/09/2020   • Abdominal pain 03/09/2020   • Dysphagia 03/10/2020   • Hypokalemia 03/10/2020   • Iron deficiency anemia due to chronic blood loss 03/11/2020   • Hyponatremia 03/19/2020     Resolved Ambulatory Problems     Diagnosis Date Noted   • No Resolved Ambulatory Problems     Past Medical History:   Diagnosis Date   • Hyperlipidemia    • Hypertension    • Suicide attempt (CMS/[facility]) 2016       PAST SURGICAL HISTORY  Past Surgical History:   Procedure Laterality Date   • ENDOSCOPY N/A 3/12/2020    Procedure: ESOPHAGOGASTRODUODENOSCOPY;  Surgeon: Maxim Powell MD;  Location: [facility] ENDOSCOPY;  Service: Gastroenterology;  Laterality: N/A;  PREOP/ GI BLEED  POSTOP/:  HH, G-J tube, peptic ulcer, duodenal ulcer,    • TRACHEOSTOMY         FAMILY HISTORY  No family history on file.    SOCIAL HISTORY  Social History     Socioeconomic History   • Marital status:      Spouse  name: Not on file   • Number of children: Not on file   • Years of education: Not on file   • Highest education level: Not on file   Tobacco Use   • Smoking status: Former Smoker   Substance and Sexual Activity   • Alcohol use: Not Currently   • Drug use: Never   • Sexual activity: Defer         ALLERGIES  Patient has no known allergies.    REVIEW OF SYSTEMS  Review of Systems   Constitutional: Negative for chills and fever.   Gastrointestinal: Negative for nausea and vomiting.        Clogged G-tube   Psychiatric/Behavioral: The patient is not nervous/anxious.        PHYSICAL EXAM  ED Triage Vitals   Temp Heart Rate Resp BP SpO2   04/12/20 1405 04/12/20 1416 04/12/20 1535 04/12/20 1416 04/12/20 1430   99.1 °F (37.3 °C) 74 18 (!) 119/30 100 %           Physical Exam   Constitutional: No distress.   HENT:   Head: Atraumatic.   Mouth/Throat: Mucous membranes are normal.   Eyes: No scleral icterus.   Neck: Normal range of motion.   Cardiovascular: Normal rate, regular rhythm and normal heart sounds.   Pulmonary/Chest: Effort normal and breath sounds normal.   Trach noted   Abdominal: Soft. There is no tenderness.       Musculoskeletal: Normal range of motion.   Neurological: He is alert.   Skin: Skin is warm and dry.   Psychiatric: Mood and affect normal.   Nursing note and vitals reviewed.        PROGRESS AND CONSULTS     Patient was placed in face mask in first look. Patient was wearing facemask when I entered the room and throughout our encounter. I wore full protective equipment throughout this patient encounter including a face mask, eye shield, gown and gloves. Hand hygiene was performed before donning protective equipment and after removal when leaving the room.  Mask was also placed around the patient's trach  1445: Was able to flush G-tube using a Merry syringe and normal saline without difficulty.  Placed adapter back on to the G-tube which patient had taken off and had in his pocket.  Patient given  instructions on what not to put through his G-tube including coffee and coffee grounds.  G-tube site was cleaned, barrier ointment applied and new dressings applied to area.  Patient also was instructed on how to keep this area clean dry and reapply the dressings.  1450: Reviewed pt's history and workup with Dr. Diana.  At bedside evaluation, they agree with the plan of care.    1500:  Reviewed implications of results, diagnosis, meds, responsibility to follow up, warning signs and symptoms of possible worsening, potential complications and reasons to return to ER with patient.  Discussed all results and noted any abnormalities with patient.  Discussed absolute need to recheck abnormalities with PMD    Discussed plan for discharge, as there is no emergent indication for admission.  Pt is agreeable and understands need for follow up and repeat testing.  Pt is aware that discharge does not mean that nothing is wrong but it indicates no emergency is present.  Pt is discharged with instructions to follow up with primary care doctor to have their blood pressure rechecked.       DIAGNOSIS  Final diagnoses:   Attention to gastrostomy tube (CMS/[facility])       FOLLOW UP   Marino Thibodeaux MD  [address] [facility] [address] [phone]    In 1 day        COURSE & MEDICAL DECISION MAKING  Results were reviewed/discussed with the patient and they were also made aware of online assess.        MEDICATIONS GIVEN IN ER  Medications - No data to display    Temp 99.1 °F (37.3 °C) (Tympanic)         HIMANSHU Mathis on 4/12/2020 at 14:50.       Lisseth Ling, APRN  04/12/20 1309
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
## 2020-04-15 NOTE — ED NOTES
Pt reports he would like to go home. MELVI Venegas notified.      Daniella Danielle, RN  04/15/20 2387

## 2020-04-15 NOTE — ED NOTES
Pt pressing call light every few seconds.  Pt refuses to get into his bed and stay in his room.  Pt stays at the door with his mask not covering his trach.  Charge Nurse called.  Sitter to be assigned asap.      Gely Thompson RN  04/15/20 9758

## 2020-04-15 NOTE — ED NOTES
Pt is repeatedly coming to the door with mask no longer covering his face or trach.   Has been repeatedly redirected to return to his room and close the door, as well as to leave both masks on .        Luzmaria Espinoza, RN  04/15/20 9182

## 2020-04-15 NOTE — ED NOTES
Pt frequently walks out of room without his mask on.  Pt advised to put his mask back on and wait in the room.  Pt presses call light every few minutes.       Gely Thompson RN  04/15/20 [id number]

## 2020-04-15 NOTE — ED TRIAGE NOTES
Pt reports generalized abd pain with nausea for past 2 hours.     Face mask placed on pt upon arrival.

## 2020-04-15 NOTE — ED PROVIDER NOTES
MD ATTESTATION NOTE    The NICOLE and I have discussed this patient's history, physical exam, and treatment plan.  I have reviewed the documentation and personally had a face to face interaction with the patient. I affirm the documentation and agree with the treatment and plan.  The attached note describes my personal findings.      History  74-year-old male who is become a frequent ED visitor presents with left-sided abdominal pain.  Patient initially wanted work-up but has been difficult to keep in his room and constantly walking out into the nurses stations.  At this point he is anxious to leave the ED prior to his work-up.     Physical Exam  Vital Signs reviewed  Alert, Well Appearing in NAD  Abdomen soft and nontender    Disposition  Patient has been up and out of his room frequently walking to the nurses station on numerous occasions.  We have told him that he needs to stay in the room and he has been uncooperative with doing so.  Patient now no longer wants to stay in the ED for further work-up and wants to leave AGAINST MEDICAL ADVICE.  Given his benign exam and frequent ED visits I do not think that he needs to stay at this point and he is welcome to sign out AGAINST MEDICAL ADVICE.     Ronnie Erazo MD  04/15/20 1678

## 2020-04-16 NOTE — ED NOTES
Pt repeatedly came to door in room. This RN as well as security Repeatedly asked him to stay in bed for his safety. Pt indicated that he wanted to go home adamantly. Pt had been seen by provider, repeatedly stated he wanted to go home. Pt continued to be non compliant with requests of staff. Firmly advised pt to return to bed numerous times, but patient refused.   Per charge nurse, pt can leave.   Moved patient to lobby to contact , charge nurse aware.   Printed AMA paperwork.      Luzmaria Espinoza, RN  04/16/20 3874       Luzmaria Espinoza, RN  04/16/20 6172

## 2020-04-16 NOTE — ED PROVIDER NOTES
EMERGENCY DEPARTMENT ENCOUNTER    Room Number:  01/01  Date seen:  4/16/2020  Time seen: 3:00 PM  PCP: Marino Thibodeaux MD  Historian: Patient    HPI:  Chief complaint: Fall  Context:Ajith Suresh is a 74 y.o. male who presents to the ED with c/o fall earlier today, injuring his left hip.  Patient reports he was using his walker, went to turn, when he lost balance and fell on his left hip.  Patient reports he was only on the floor for a few minutes and was able to get back up.  He reports he has frequent falls.    Since initial evaluation, patient has been harassing nursing staff and consistently standing at the door.    Timing: Gradual  Duration: Prior to arrival  Intensity/Severity: Mild  Associated Symptoms: Left hip pain  Aggravating Factors: Ambulation  Alleviating Factors: Rest  Previous Episodes: History of multiple falls  Treatment before arrival: None    MEDICAL RECORD REVIEW  Reviewed old records.  Patient has been in the ER several times this month.  Patient was last seen yesterday for abdominal pain and signed AMA.    ALLERGIES  Patient has no known allergies.    PAST MEDICAL HISTORY  Active Ambulatory Problems     Diagnosis Date Noted   • Iron deficiency anemia 03/09/2020   • Falls frequently 03/09/2020   • Underweight BMI 18.1 03/09/2020   • Abdominal pain 03/09/2020   • Dysphagia 03/10/2020   • Hypokalemia 03/10/2020   • Iron deficiency anemia due to chronic blood loss 03/11/2020   • Hyponatremia 03/19/2020     Resolved Ambulatory Problems     Diagnosis Date Noted   • No Resolved Ambulatory Problems     Past Medical History:   Diagnosis Date   • Hyperlipidemia    • Hypertension    • Suicide attempt (CMS/[facility]) 2016       PAST SURGICAL HISTORY  Past Surgical History:   Procedure Laterality Date   • ENDOSCOPY N/A 3/12/2020    Procedure: ESOPHAGOGASTRODUODENOSCOPY;  Surgeon: Maxim Powell MD;  Location: [facility] ENDOSCOPY;  Service: Gastroenterology;  Laterality: N/A;  PREOP/ GI BLEED  POSTOP/:  ,  G-J tube, peptic ulcer, duodenal ulcer,    • TRACHEOSTOMY         FAMILY HISTORY  No family history on file.    SOCIAL HISTORY  Social History     Socioeconomic History   • Marital status:      Spouse name: Not on file   • Number of children: Not on file   • Years of education: Not on file   • Highest education level: Not on file   Tobacco Use   • Smoking status: Former Smoker   Substance and Sexual Activity   • Alcohol use: Not Currently   • Drug use: Never   • Sexual activity: Defer       REVIEW OF SYSTEMS  Review of Systems    All systems reviewed and negative except for those discussed in HPI.   PHYSICAL EXAM    ED Triage Vitals [04/16/20 1411]   Temp Heart Rate Resp BP SpO2   98.6 °F (37 °C) 64 20 133/72 100 %      Temp src Heart Rate Source Patient Position BP Location FiO2 (%)   -- -- -- -- --     Physical Exam    I have reviewed the triage vital signs and nursing notes.      GENERAL: not distressed; chronically ill-appearing, appears disheveled; patient is ambulatory  HENT: nares patent; chronic facial deformities  EYES: no scleral icterus  NECK: no ROM limitations  CV: regular rhythm, regular rate  RESPIRATORY: normal effort  ABDOMEN: soft; G-tube in good position  : deferred  MUSCULOSKELETAL: Ambulatory; mild left anterior hip tenderness  NEURO: alert, moves all extremities, follows commands  SKIN: warm, dry; multiple superficial abrasions to his extremities      PROGRESS, DATA ANALYSIS, CONSULTS AND MEDICAL DECISION MAKING  All labs have been independently reviewed by me.  All radiology studies have been reviewed by me and discussed with radiologist dictating the report.  EKG's independently viewed and interpreted by me unless stated otherwise. Discussion below represents my analysis of pertinent findings related to patient's condition, differential diagnosis, treatment plan and final disposition.     Patient was placed in face mask in first look. Patient was wearing facemask each time I entered  the room and throughout our encounter. I wore full protective equipment throughout this patient encounter including a face mask, eye shield, gown and gloves. Hand hygiene was performed before donning protective equipment and after removal when leaving the room.               Disposition vitals:  /72   Pulse 64   Temp 98.6 °F (37 °C)   Resp 20   SpO2 100%       DIAGNOSIS  Final diagnoses:   Fall, initial encounter       FOLLOW UP   No follow-up provider specified.       Lena James PA-C  04/16/20 1814

## 2020-04-16 NOTE — ED NOTES
Advised pt to remain in the bed. Advised that he reported a fall and should be out of the bed. Pt has urinal at beside.      Luzmaria Espinoza RN  04/16/20 5709

## 2020-04-16 NOTE — ED NOTES
Pt reports he fell at home after losing his balance. Pt complains of left flank pain.    Mask put on patient at triage.      Bailey Beltran RN  04/16/20 2015

## 2020-04-16 NOTE — OUTREACH NOTE
ED Potential Covid Discharge Follow-up    Attempted to call pt to discuss FU regarding Covid 19. After introducing ACM role pt hung up the phone. Call to Dr Marino Tobias office and it is closed with only the ability to leave a message. Message left with  vm that Mr Suresh has been tested, was unable to reach but that we request an apt be set up.     Lelo Munguia RN  Ambulatory     4/16/2020, 09:42

## 2020-04-17 NOTE — ED PROVIDER NOTES
EMERGENCY DEPARTMENT ENCOUNTER    Room Number:  14/14  Date of encounter:  4/17/2020  PCP: Marino Thibodeaux MD    HPI:  Context: Ajith Suresh is a 74 y.o. male who presents to the ED c/o chief complaint of left-sided chest wall pain.  Patient states he was here for a fall yesterday, left hip pain at that time.  Patient now complaining of left sided rib pain from that fall.  No difficulty breathing, no abdominal pain, no vomiting.    MEDICAL HISTORY REVIEW  Reviewed in EPIC    PAST MEDICAL HISTORY  Active Ambulatory Problems     Diagnosis Date Noted   • Iron deficiency anemia 03/09/2020   • Falls frequently 03/09/2020   • Underweight BMI 18.1 03/09/2020   • Abdominal pain 03/09/2020   • Dysphagia 03/10/2020   • Hypokalemia 03/10/2020   • Iron deficiency anemia due to chronic blood loss 03/11/2020   • Hyponatremia 03/19/2020     Resolved Ambulatory Problems     Diagnosis Date Noted   • No Resolved Ambulatory Problems     Past Medical History:   Diagnosis Date   • Hyperlipidemia    • Hypertension    • Suicide attempt (CMS/[facility]) 2016       PAST SURGICAL HISTORY  Past Surgical History:   Procedure Laterality Date   • ENDOSCOPY N/A 3/12/2020    Procedure: ESOPHAGOGASTRODUODENOSCOPY;  Surgeon: Maxim Powell MD;  Location: [facility] ENDOSCOPY;  Service: Gastroenterology;  Laterality: N/A;  PREOP/ GI BLEED  POSTOP/:  HH, G-J tube, peptic ulcer, duodenal ulcer,    • TRACHEOSTOMY         FAMILY HISTORY  History reviewed. No pertinent family history.    SOCIAL HISTORY  Social History     Socioeconomic History   • Marital status:      Spouse name: Not on file   • Number of children: Not on file   • Years of education: Not on file   • Highest education level: Not on file   Tobacco Use   • Smoking status: Former Smoker   Substance and Sexual Activity   • Alcohol use: Not Currently   • Drug use: Never   • Sexual activity: Defer       ALLERGIES  Patient has no known allergies.    The patient's allergies have been  reviewed    REVIEW OF SYSTEMS  All systems reviewed and negative except for those discussed in HPI.     PHYSICAL EXAM  I have reviewed the triage vital signs and nursing notes.  ED Triage Vitals   Temp Heart Rate Resp BP SpO2   04/17/20 1302 04/17/20 1253 04/17/20 1253 04/17/20 1253 04/17/20 1253   98 °F (36.7 °C) 70 18 118/66 99 %      Temp src Heart Rate Source Patient Position BP Location FiO2 (%)   04/17/20 1302 04/17/20 1253 04/17/20 1253 04/17/20 1253 --   Tympanic Monitor Sitting Right arm      GENERAL: No acute distress  HENT: NCAT, chronic facial deformities, PERRL, Nares patent  EYES: no scleral icterus  NECK: trachea midline, no ROM limitations  CV: regular rhythm, regular rate  RESPIRATORY: normal effort  Chest wall: Left lateral chest wall tenderness, no point tenderness, no crepitus or deformity.  ABDOMEN: soft  : deferred  MUSCULOSKELETAL: no deformity  NEURO: alert, moves all extremities, follows commands  SKIN: warm, dry    LAB RESULTS  No results found for this or any previous visit (from the past 24 hour(s)).    I ordered the above labs and reviewed the results.    RADIOLOGY  No Radiology Exams Resulted Within Past 24 Hours    I ordered the above noted radiological studies. I reviewed the images and results. I agree with the radiologist interpretation.    PROCEDURES  Procedures    MEDICATIONS GIVEN IN ER  Medications   lidocaine (LIDODERM) 5 % 1 patch (1 patch Transdermal Medication Applied 4/17/20 1354)       PROGRESS, DATA ANALYSIS, CONSULTS, AND MEDICAL DECISION MAKING  A complete history and physical exam have been performed.  All available laboratory and imaging results have been reviewed by myself prior to disposition.  Face mask and gloves were worn throughout the patient encounter, unless additional PPE was worn and specified below. Hand hygiene was performed before entering and after leaving the patient room.  [facility]    ED Course as of Apr 17 1357   Fri Apr 17, 2020   1302 Discussed  pertinent information from history and physical exam with patient.  Discussed differential diagnosis.  Discussed plan for ED evaluation/work-up/treatment.  All questions answered.  Patient is agreeable with plan.        [JG]   1306 Patient complaining of left-sided chest wall pain after fall the previous day.  Patient was seen yesterday and did not endorse chest wall pain at that time, no additional trauma.  Patient has no point tenderness, low suspicion for fracture.  Obtaining AP chest x-ray, treating with Lidoderm patch.  If x-ray negative, plan for discharge with primary care follow-up.    [JG]   1345 Informed by nursing staff, patient desires discharge now.  Discussed with patient, chest x-ray results currently pending.  No pneumothorax, no obvious fractures per my read.  Patient does not wish to wait for chest x-ray to result.  Discharging with primary care follow-up.    [JG]   5536 The patient was reexamined.  They have had symptomatic improvement during their ED stay.  I discussed today's findings with the patient, explaining the pertinent positives and negatives from today's visit, and the plan of care.  Discussed plan for discharge as there is no emergent indication for admission.  Discussed limitation of the ED work-up and that this is to rule out life-threatening emergencies but that they could require further testing as determined by their primary care and or any referred specialist patient is agreeable and understands need for follow-up and repeat exam/testing.  Patient is aware that discharge does not mean there is nothing wrong, indicates no emergency is present, and that they must continue their care with their primary care physician and/or any referred specialist.  They were given appropriate follow-up with their primary care physician and/or specialist.  I had an extensive discussion on the expected clinical course and return precautions.  Patient understands to return to the emergency department  for continuation, worsening, or new symptoms.  I answered any of the patient's questions. Patient was discharged home in a stable condition.        [JG]      ED Course User Index  [JG] Carrillo Mir MD       AS OF 13:57 VITALS:    BP - 118/66  HR - 70  TEMP - 98 °F (36.7 °C) (Tympanic)  O2 SATS - 99%    DIAGNOSIS  Final diagnoses:   Chest wall pain         DISPOSITION  DISCHARGE    Patient discharged in stable condition.    Reviewed implications of results, diagnosis, meds, responsibility to follow up, warning signs and symptoms of possible worsening, potential complications and reasons to return to ER.    Patient/Family voiced understanding of above instructions.    Discussed plan for discharge, as there is no emergent indication for admission. Patient referred to primary care provider for BP management due to today's BP. Pt/family is agreeable and understands need for follow up and repeat testing.  Pt is aware that discharge does not mean that nothing is wrong but it indicates no emergency is present that requires admission and they must continue care with follow-up as given below or physician of their choice.     FOLLOW-UP  Marino Thibodeaux MD  [address] [facility] [address] [phone]    Schedule an appointment as soon as possible for a visit in 2 days           Medication List      New Prescriptions    lidocaine 5 %  Commonly known as:  Lidoderm  Place 1 patch on the skin as directed by provider Daily. Remove & Discard   patch within 12 hours or as directed by Carrillo Carrasco MD  04/17/20 2471

## 2020-04-17 NOTE — ED NOTES
"Pt presents to ED via EMS from home. Per EMS pt as no physical pain or complains other than \"I don't want to be home alone, I cant take care of myself.\" Pt wife was placed in hospital at this time. Pt is A&Ox4 at this time. Pt denies falls or anything today. Police and APS were at the home today. Pt was seen here yesterday.      Celeste Stallings, RN  04/17/20 1256              Celeste Stallings, RN  04/17/20 1257    "

## 2020-04-17 NOTE — ED NOTES
Patient was placed in face mask in first look.  Patient was wearing a face mask throughout our encounter.  I wore protective eye protection throughout the encounter.  Hand hygiene was performed before and after patient encounter.        Marino Urbina RN  04/17/20 6792

## 2020-04-18 NOTE — ED NOTES
Glycerin enema administered by MD Bhatt. Pt immediately had a large dark brown bowel movement. Pt cleaned and bed sheets replaced by this RN and RNs Cheryl and Laura.      Mike Page RN  04/18/20 1767

## 2020-04-18 NOTE — ED NOTES
Pt son called in attempts to arrange a ride for discharge. No answer at this time, message left to return call.      Cheryl Alexander RN  04/18/20 4534

## 2020-04-18 NOTE — ED PROVIDER NOTES
EMERGENCY DEPARTMENT ENCOUNTER    Room Number:  39/39  Date of encounter:  4/18/2020  PCP: Marino Thibodeaux MD  Historian: Pt      HPI:  Chief Complaint: Abdominal discomfort  A complete HPI/ROS/PMH/PSH/SH/FH are unobtainable due to: N/A   Context: Ajith Suresh is a 74 y.o. male who presents to the ED c/o abdominal discomfort for 2 weeks.  No exacerbating or relieving factors identified.  Patient has been evaluated for similar complaint in this emergency department in the last 2 weeks.  No new changes in discomfort.  Patient notes that his discomfort is moderate.  He has a bowel movement only about every 4 days that is nonbloody, nonblack.  No vomiting reported.  He denies any dysuria or hematuria.  He reports that his G-tube has been functioning normally.      MEDICAL HISTORY REVIEW  Review of EMR reveals numerous recent ED visits.    PAST MEDICAL HISTORY  Active Ambulatory Problems     Diagnosis Date Noted   • Iron deficiency anemia 03/09/2020   • Falls frequently 03/09/2020   • Underweight BMI 18.1 03/09/2020   • Abdominal pain 03/09/2020   • Dysphagia 03/10/2020   • Hypokalemia 03/10/2020   • Iron deficiency anemia due to chronic blood loss 03/11/2020   • Hyponatremia 03/19/2020     Resolved Ambulatory Problems     Diagnosis Date Noted   • No Resolved Ambulatory Problems     Past Medical History:   Diagnosis Date   • Hyperlipidemia    • Hypertension    • Suicide attempt (CMS/[facility]) 2016         PAST SURGICAL HISTORY  Past Surgical History:   Procedure Laterality Date   • ENDOSCOPY N/A 3/12/2020    Procedure: ESOPHAGOGASTRODUODENOSCOPY;  Surgeon: Maxim Powell MD;  Location: [facility] ENDOSCOPY;  Service: Gastroenterology;  Laterality: N/A;  PREOP/ GI BLEED  POSTOP/:  HH, G-J tube, peptic ulcer, duodenal ulcer,    • TRACHEOSTOMY           FAMILY HISTORY  No family history on file.      SOCIAL HISTORY  Social History     Socioeconomic History   • Marital status:      Spouse name: Not on file   • Number  of children: Not on file   • Years of education: Not on file   • Highest education level: Not on file   Tobacco Use   • Smoking status: Former Smoker   Substance and Sexual Activity   • Alcohol use: Not Currently   • Drug use: Never   • Sexual activity: Defer         ALLERGIES  Patient has no known allergies.        REVIEW OF SYSTEMS  Review of Systems     All systems reviewed and negative except for those discussed in HPI.       PHYSICAL EXAM    I have reviewed the triage vital signs and nursing notes.    ED Triage Vitals   Temp Heart Rate Resp BP SpO2   04/18/20 0945 04/18/20 0924 04/18/20 0924 04/18/20 0924 04/18/20 0924   98.8 °F (37.1 °C) 64 16 140/80 100 %      Temp src Heart Rate Source Patient Position BP Location FiO2 (%)   04/18/20 0945 -- -- -- --   Oral           Physical Exam    Physical Exam   Constitutional: No distress.   HENT:  Head: postsurgical findings of the face and neck  Eyes: No scleral icterus. No conjunctival pallor.  Neck: Tracheostomy tube in place  Cardiovascular: Normal rate, regular rhythm and intact distal pulses.  Pulmonary/Chest: No respiratory distress. There are no wheezes, no rhonchi, and no rales.   Abdominal: Soft. There is no tenderness. There is no rebound and no guarding.  G-tube left midabdomen with some skin irritation around the site without evidence of cellulitis.  Patient has applied some topical salve that appears to have zinc oxide base.  Rectal: Large ball soft stool high in rectal vault.  No other abnormalities.  No gross blood.  Musculoskeletal: Moves all extremities equally. There is no pedal edema or calf tenderness.   Neurological: Alert.  Baseline strength and sensation noted.   Skin: Skin is pink, warm, and dry. No pallor.   Psychiatric: Normal affect.  Nursing note and vitals reviewed.    LAB RESULTS  Recent Results (from the past 24 hour(s))   Comprehensive Metabolic Panel    Collection Time: 04/18/20  9:35 AM   Result Value Ref Range    Glucose 109 (H) 65 -  99 mg/dL    BUN 37 (H) 8 - 23 mg/dL    Creatinine 0.48 (L) 0.76 - 1.27 mg/dL    Sodium 128 (L) 136 - 145 mmol/L    Potassium 4.4 3.5 - 5.2 mmol/L    Chloride 95 (L) 98 - 107 mmol/L    CO2 26.9 22.0 - 29.0 mmol/L    Calcium 9.0 8.6 - 10.5 mg/dL    Total Protein 6.5 6.0 - 8.5 g/dL    Albumin 3.60 3.50 - 5.20 g/dL    ALT (SGPT) 13 1 - 41 U/L    AST (SGOT) 19 1 - 40 U/L    Alkaline Phosphatase 136 (H) 39 - 117 U/L    Total Bilirubin 0.3 0.2 - 1.2 mg/dL    eGFR Non African Amer >150 >60 mL/min/1.73    Globulin 2.9 gm/dL    A/G Ratio 1.2 g/dL    BUN/Creatinine Ratio 77.1 (H) 7.0 - 25.0    Anion Gap 6.1 5.0 - 15.0 mmol/L   Lipase    Collection Time: 04/18/20  9:35 AM   Result Value Ref Range    Lipase 42 13 - 60 U/L   Urinalysis With Microscopic If Indicated (No Culture) - Urine, Clean Catch    Collection Time: 04/18/20  9:35 AM   Result Value Ref Range    Color, UA Yellow Yellow, Straw    Appearance, UA Clear Clear    pH, UA 8.0 5.0 - 8.0    Specific Gravity, UA 1.019 1.005 - 1.030    Glucose, UA Negative Negative    Ketones, UA Negative Negative    Bilirubin, UA Negative Negative    Blood, UA Negative Negative    Protein, UA Negative Negative    Leuk Esterase, UA Negative Negative    Nitrite, UA Negative Negative    Urobilinogen, UA 0.2 E.U./dL 0.2 - 1.0 E.U./dL   CBC Auto Differential    Collection Time: 04/18/20  9:35 AM   Result Value Ref Range    WBC 6.74 3.40 - 10.80 10*3/mm3    RBC 3.74 (L) 4.14 - 5.80 10*6/mm3    Hemoglobin 9.4 (L) 13.0 - 17.7 g/dL    Hematocrit 29.9 (L) 37.5 - 51.0 %    MCV 79.9 79.0 - 97.0 fL    MCH 25.1 (L) 26.6 - 33.0 pg    MCHC 31.4 (L) 31.5 - 35.7 g/dL    RDW      MPV 10.7 6.0 - 12.0 fL    Platelets 224 140 - 450 10*3/mm3    Neutrophil % 77.3 (H) 42.7 - 76.0 %    Lymphocyte % 13.2 (L) 19.6 - 45.3 %    Monocyte % 7.6 5.0 - 12.0 %    Eosinophil % 1.2 0.3 - 6.2 %    Basophil % 0.6 0.0 - 1.5 %    Immature Grans % 0.1 0.0 - 0.5 %    Neutrophils, Absolute 5.21 1.70 - 7.00 10*3/mm3    Lymphocytes,  Absolute 0.89 0.70 - 3.10 10*3/mm3    Monocytes, Absolute 0.51 0.10 - 0.90 10*3/mm3    Eosinophils, Absolute 0.08 0.00 - 0.40 10*3/mm3    Basophils, Absolute 0.04 0.00 - 0.20 10*3/mm3    Immature Grans, Absolute 0.01 0.00 - 0.05 10*3/mm3    nRBC 0.0 0.0 - 0.2 /100 WBC   Scan Slide    Collection Time: 04/18/20  9:35 AM   Result Value Ref Range    Anisocytosis Large/3+ None Seen    Elliptocytes Mod/2+ None Seen    RBC Fragments Slight/1+ None Seen    Spherocytes Slight/1+ None Seen    WBC Morphology Normal Normal    Platelet Morphology Normal Normal       Ordered the above labs and independently reviewed the results.        RADIOLOGY  Xr Abdomen Flat & Upright    Result Date: 4/18/2020  TWO-VIEW ABDOMEN  HISTORY: Abdominal pain.  FINDINGS: There is a small amount of bowel gas scattered in both large and small bowel and there is a moderate amount of dense stool in the rectal ampulla. There is no evidence of small bowel obstruction or free air or unusual abdominal calcification.      Xr Chest 1 View    Result Date: 4/17/2020  PORTABLE CHEST X-RAY  HISTORY: Left lateral chest wall pain after a fall.  TECHNIQUE: PA view of the chest is provided and correlated with chest x-ray 04/14/2020.  FINDINGS: There is a tracheostomy tube, there is evidence of previous surgery to left chest wall with partial absence of the lateral left sixth rib, old rib fractures, and surgical clips in the axilla. There are also surgical clips at the base of the neck on the left. The lungs appear clear and the costophrenic sulci are dry. There is no pneumothorax. No acute deformity is identified      Postsurgical change and old rib fractures of the left chest wall. No acute abnormality is identified.  This report was finalized on 4/17/2020 2:11 PM by Dr. Francis Robles M.D.        I ordered the above noted radiological studies. Reviewed by me and discussed with radiologist.  See dictation for official radiology  interpretation.      PROCEDURES    Procedures        MEDICATIONS GIVEN IN ER    Medications   lidocaine (XYLOCAINE) 2 % jelly (has no administration in time range)   sodium chloride 0.9 % flush 10 mL (has no administration in time range)   sodium chloride 0.9 % bolus 1,000 mL (has no administration in time range)   Glycerin (Laxative) enema 7.5 mL (has no administration in time range)         PROGRESS, DATA ANALYSIS, CONSULTS, AND MEDICAL DECISION MAKING    My differential diagnosis for abdominal pain includes but is not limited to:  Gastritis, gastroenteritis, peptic ulcer disease, GERD, esophageal perforation, acute appendicitis, mesenteric adenitis, Meckel’s diverticulum, epiploic appendagitis, diverticulitis, colon cancer, ulcerative colitis, Crohn’s disease, intussusception, small bowel obstruction, adhesions, ischemic bowel, perforated viscus, ileus, obstipation, biliary colic, cholecystitis, cholelithiasis, Bipin-Tanner Melvin, hepatitis, pancreatitis, common bile duct obstruction, cholangitis, bile leak, splenic trauma, splenic rupture, splenic infarction, splenic abscess, abdominal abscess, ascites, spontaneous bacterial peritonitis, hernia, UTI, cystitis, prostatitis, ureterolithiasis, urinary obstruction, ovarian cyst, torsion, pregnancy, ectopic pregnancy, PID, pelvic abscess, mittelschmerz, endometriosis, AAA, myocardial infarction, pneumonia, cancer, porphyria, DKA, medications, sickle cell, viral syndrome, zoster    All labs have been independently reviewed by me.  All radiology studies have been reviewed by me and discussed with radiologist dictating the report.   EKG's independently viewed and interpreted by me.  Discussion below represents my analysis of pertinent findings related to patient's condition, differential diagnosis, treatment plan and final disposition.      ED Course as of Apr 18 1044   Sat Apr 18, 2020   0068 I offered topical, and confirmation of G-tube function to the patient for his  discomfort, which he accepts, but implored me to evaluate further for internal abdominal discomfort and is concerned about his constipation.  I had a long discussion with patient about his need to stay for full evaluation if we were going to undertake it.  He repeatedly expresses interest in this evaluation and is agreeable to stay for the duration.    [RS]   1038 Patient has received glycerin suppository following digital rectal exam and patient feels like he needs to try to have bowel movement.  He is receiving IV fluid bolus for volume depletion and hyponatremia.  Patient denies that he takes in significant amounts of free water via G-tube daily but I will advise him to cut back on any free water at this time due to hyponatremia and have him contact his primary provider.    [RS]      ED Course User Index  [RS] Gibson Bhatt MD       AS OF 10:44 VITALS:    BP - 140/80  HR - 64  TEMP - 98.8 °F (37.1 °C) (Oral)  O2 SATS - 100%        DIAGNOSIS  Final diagnoses:   Acute abdominal pain   Impacted stool in rectum (CMS/HCC)   Mild hyponatremia         DISPOSITION  DISCHARGE    Patient discharged in stable condition.    Reviewed implications of results, diagnosis, meds, responsibility to follow up, warning signs and symptoms of possible worsening, potential complications and reasons to return to ER.    Patient/Family voiced understanding of above instructions.    Discussed plan for discharge, as there is no emergent indication for admission. Patient referred to primary care provider for BP management due to today's BP. Pt/family is agreeable and understands need for follow up and repeat testing.  Pt is aware that discharge does not mean that nothing is wrong but it indicates no emergency is present that requires admission and they must continue care with follow-up as given below or physician of their choice.     FOLLOW-UP  Marino Thibodeaux MD  [address] [facility] [address] [phone]    Call in 3  days  Repeat labs and further evaluation of hyponatremia         Medication List      New Prescriptions    docusate sodium 150 MG/15ML liquid  Commonly known as:  COLACE  10 mL by Per G Tube route Daily.               Gibson Bhatt MD  04/18/20 8578

## 2020-04-18 NOTE — ED NOTES
PT had placed some cream around g-tube site. Cream wiped off, lidocaine soaked sterile bandage applied around g-tube site and covered with second sterile bandage.      Mike Paeg RN  04/18/20 9098

## 2020-04-18 NOTE — DISCHARGE INSTRUCTIONS
Decrease free water intake and consult with your primary care provider for repeat labs within 3 to 5 days.    Return to the emergency department with worsening symptoms, uncontrolled pain, inability to tolerate oral liquids, fever greater than 105°F not controlled by Tylenol and ibuprofen or as needed with emergent concerns.

## 2020-04-18 NOTE — ED NOTES
"EMS reports pt called 911 for \"pain in his g-tube\". This is the pt's 6th trip to the ER this week. Pt has left AMA multiple times from ER. Mask placed on pt face and trach at triage, masks on triage staff during first look.     Dalila Mckeon, RN  04/18/20 0924    "

## 2020-04-23 PROBLEM — R53.1 GENERALIZED WEAKNESS: Status: ACTIVE | Noted: 2020-01-01

## 2020-04-24 PROBLEM — R79.89 ELEVATED LFTS: Status: ACTIVE | Noted: 2020-01-01

## 2020-04-24 PROBLEM — T79.6XXA TRAUMATIC RHABDOMYOLYSIS (HCC): Status: ACTIVE | Noted: 2020-01-01

## 2020-04-24 PROBLEM — E87.0 HYPERNATREMIA: Status: ACTIVE | Noted: 2020-01-01

## 2020-04-24 PROBLEM — G93.41 METABOLIC ENCEPHALOPATHY: Status: ACTIVE | Noted: 2020-01-01

## 2020-04-24 PROBLEM — E86.0 DEHYDRATION: Status: ACTIVE | Noted: 2020-01-01

## 2020-04-24 PROBLEM — Z93.0 TRACHEOSTOMY PRESENT (HCC): Chronic | Status: ACTIVE | Noted: 2020-01-01

## 2020-04-24 PROBLEM — N17.9 AKI (ACUTE KIDNEY INJURY) (HCC): Status: ACTIVE | Noted: 2020-01-01

## 2020-04-24 PROBLEM — R77.8 ELEVATED TROPONIN: Status: ACTIVE | Noted: 2020-01-01
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
## 2020-05-01 NOTE — NURSING NOTE
Pt had fall. Walked into pt room, found pt down on knees holding onto side rail. Bed alarm broken. Pt unable to speak d/t trach. Pt pointed at call light and pointed at floor, signaling he was reaching for call light that was on the floor. Pt denied pain by nodding head, no signs of injury or distress. Call light placed in reach, 3 side rails up, bed switched out and alarm on.
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
## 2020-05-01 NOTE — PROGRESS NOTES
Continued Stay Note  [facility]     Patient Name: Ajith Suresh  MRN: [id number]  Today's Date: 5/1/2020    Admit Date: 4/23/2020    Discharge Plan     Row Name 05/01/20 1614       Plan    Plan  Pt is not appropriate for home with HH @ this time.  Noted that hospice could admit if pt becomes comfort care.  Spoke with MD, he will follow with son Eulalio to discuss goals of care and appropriate treatment.  Hilary Walker RN/CCP        Discharge Codes    No documentation.             Hilary Walker RN

## 2020-05-01 NOTE — PROGRESS NOTES
"Adult Nutrition  Assessment/PES    Patient Name:  Ajith Suresh  YOB: 1945  MRN: [id number]  Admit Date:  4/23/2020    Assessment Date:  5/1/2020    Comments:  Nutrition follow up.  TFs of Impact Peptide 1.5 continue at goal rate of 50 ml/hr.  Tolerating well.  No issues at present per chart review.    Patient in posey vest and wrist restraints.  Agitated and very rude to staff at times.    Hospice has evaluated patient and MD does not think he is appropriate at this time, however they are going to follow up on Monday 5/4.    Due to the COVID pandemic, nutrition assessment completed based on review of electronic medical record. This RD currently working remotely and can be reached at [phone], via secure chat or email.     RD will continue to monitor.     Reason for Assessment     Row Name 05/01/20 1012          Reason for Assessment    Reason For Assessment  TF/PN;follow-up protocol         Anthropometrics     Row Name 05/01/20 1012 05/01/20 0527       Anthropometrics    Height  165.1 cm (65\")  --    Weight  --  63.4 kg (139 lb 12.4 oz)       Admit Weight    Admit Weight  -- 139# 5/1  --       Ideal Body Weight (IBW)    Ideal Body Weight (IBW) (kg)  62.51  --       Body Mass Index (BMI)    BMI Assessment  BMI 18.5-24.9: normal 23.21  --        Labs/Tests/Procedures/Meds     Row Name 05/01/20 1014          Labs/Procedures/Meds    Lab Results Reviewed  reviewed, pertinent     Lab Results Comments  Gluc, Creat, BUN, Alb, WBC, Hgb, Hct        Diagnostic Tests/Procedures    Diagnostic Test/Procedure Reviewed  reviewed, pertinent        Medications    Pertinent Medications Reviewed  reviewed, pertinent     Pertinent Medications Comments  FeSO4, lasix, humulin R, zosyn, vanc         Physical Findings     Row Name 05/01/20 1015          Physical Findings    Overall Physical Appearance  on oxygen therapy trach     Gastrointestinal  feeding tube     Tubes  PEG     Skin  other (see comments) B=19, L arm " "skin tear, L arm traumatic wound, R arm skin tears, scapula abrasion         Estimated/Assessed Needs     Row Name 05/01/20 1012          Calculation Measurements    Height  165.1 cm (65\")         Nutrition Prescription Ordered     Row Name 05/01/20 1016          Nutrition Prescription PO    Current PO Diet  NPO        Nutrition Prescription EN    Enteral Route  PEG     Product  Impact Peptide 1.5 (Pivot 1.5)     TF Delivery Method  Continuous     Continuous TF Goal Rate (mL/hr)  50 mL/hr     Continuous TF Current Rate (mL/hr)  50 mL/hr     Water flush (mL)   200 mL     Water Flush Frequency  Other (comment) q 6 hours         Evaluation of Received Nutrient/Fluid Intake     Row Name 05/01/20 1017          Nutrient/Fluid Evaluation    Additional Documentation  Calories Evaluation (Group);Protein Evaluation (Group);Fluid Intake Evaluation (Group)        Calories Evaluation    Enteral Calories (kcal)  1800     % of Kcal Needs  100        Protein Evaluation    Enteral Protein (gm)  113     % of Protein Needs  100        Intake Assessment    Energy/Calorie Requirement Assessment  meeting needs     Protein Requirement Assessment  meeting needs     Fluid Requirement Assessment  meeting needs     Tolerance  tolerating        Fluid Intake Evaluation    Enteral (Free Water) Fluid (mL)  924     Free Water Flush Fluid (mL)  800     Total Free Water Intake (mL)  1724 mL        EN Evaluation    HOB  Greater than or equal to 30 degress         Problem/Interventions:    Intervention Goal     Row Name 05/01/20 1017          Intervention Goal    General  Maintain nutrition;Reduce/improve symptoms;Disease management/therapy;Nutrition support treatment     TF/PN  Maintain TF/PN     Weight  Maintain weight         Nutrition Intervention     Row Name 05/01/20 1018          Nutrition Intervention    RD/Tech Action  Follow Tx progress;Care plan reviewd     Recommended/Ordered  EN         Nutrition Prescription     Row Name 05/01/20 1018    "       Nutrition Prescription EN    Enteral Prescription  Continue same protocol         Education/Evaluation     Row Name 05/01/20 1018          Education    Education  Will Instruct as appropriate        Monitor/Evaluation    Monitor  Per protocol;I&O;Pertinent labs;TF delivery/tolerance;Weight;Skin status;Symptoms           Electronically signed by:  Janet Dowling RD  05/01/20 10:18

## 2020-05-01 NOTE — PLAN OF CARE
Problem: Patient Care Overview  Goal: Plan of Care Review  Flowsheets (Taken 5/1/2020 1047)  Progress: improving  Plan of Care Reviewed With: patient  Outcome Summary: Pt able to sit up EOB for >5 min with min A for sitting balance and perform UE exercises and minimal grooming. Pt continues to be very weak and requires assist forsimple tasks.  Patient was not wearing a face mask during this therapy encounter. Therapist used appropriate personal protective equipment including mask and gloves.  Mask used was standard procedure mask. Appropriate PPE was worn during the entire therapy session. Hand hygiene was completed before and after therapy session. Patient is not in enhanced droplet precautions.

## 2020-05-01 NOTE — PLAN OF CARE
Problem: Restraint, Nonbehavioral (Nonviolent)  Goal: Rationale and Justification  Outcome: Ongoing (interventions implemented as appropriate)  Flowsheets (Taken 4/28/2020 1742 by Ashtyn Joseph RN)  Rationale and Justification: prevent harm to self;failure of less restrictive safety measures  Goal: Nonbehavioral (Nonviolent) Restraint: Absence of Injury/Harm  Outcome: Ongoing (interventions implemented as appropriate)  Flowsheets (Taken 5/1/2020 0441)  Nonbehavioral (Nonviolent) Restraint: Absence of Injury/Harm: met  Goal: Nonbehavioral (Nonviolent) Restraint: Achievement of Discontinuation Criteria  Outcome: Ongoing (interventions implemented as appropriate)  Flowsheets (Taken 5/1/2020 0441)  Nonbehavioral (Nonviolent) Restraint: Achievement of Discontinuation Criteria: not met  Goal: Nonbehavioral (Nonviolent) Restraint: Preservation of Dignity and Wellbeing  Outcome: Ongoing (interventions implemented as appropriate)  Flowsheets (Taken 5/1/2020 0441)  Nonbehavioral (Nonviolent) Restraint: Preservation of Dignity and Wellbeing: met     Problem: Patient Care Overview  Goal: Plan of Care Review  Outcome: Ongoing (interventions implemented as appropriate)  Flowsheets  Taken 4/30/2020 0450  Progress: no change  Taken 5/1/2020 0000  Plan of Care Reviewed With: patient  Remains alert, confused, restless, agitated.  VSS on O2 at 10lpm/50%Fio2 to trach mask. Required suctioning several times through the evening. Tube feeds continue at goal. Pt tolerating well. Posey and bilateral soft wrist restraints remain in place. No s/s distress observed. AM labs pending. Will cont to monitor.

## 2020-05-01 NOTE — SIGNIFICANT NOTE
05/01/20 1441   Rehab Treatment   Discipline physical therapist   Reason Treatment Not Performed other (see comments)  (Attempted PT twice today- both times patient remains minimally responsive to sternal rub and very lethargic. PT will hold today and continue to follow, as appropriate.)
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
## 2020-05-01 NOTE — PLAN OF CARE
Problem: Patient Care Overview  Goal: Plan of Care Review  Outcome: Ongoing (interventions implemented as appropriate)     Problem: Restraint, Nonbehavioral (Nonviolent)  Goal: Rationale and Justification  Outcome: Ongoing (interventions implemented as appropriate)     Problem: Restraint, Nonbehavioral (Nonviolent)  Goal: Nonbehavioral (Nonviolent) Restraint: Absence of Injury/Harm  Outcome: Ongoing (interventions implemented as appropriate)       Pt currently resting in bed, appears to be in no acute distress.  VSS.  Pt has been in and out of drowsiness today, but easily arousable.  Pt continues to try to get OOB w/ restraints in place.  Pt stated on IV Vanc and Zosyn for PNA.  No needs noted at this time, will continue to monitor and assess.
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
## 2020-05-02 NOTE — PROGRESS NOTES
"Pharmacokinetic Consult - Vancomycin Dosing (Follow-up Note)    Ajith Suresh is on day 2 pharmacy to dose vancomycin for PNA per Dr. Nils Husain's request. Goal trough: 15-20 mcg/mL.    Duration of Therapy: 7 days    Other Antimicrobials: Zosyn 3.375g IV q8h    Relevant clinical data and objective history reviewed:  74 y.o. male 165.1 cm (65\") 59.4 kg (131 lb)    Vitals:    05/02/20 0500 05/02/20 0706 05/02/20 0734 05/02/20 1103   BP:    129/62   BP Location:    Left arm   Pulse:  92 112 91   Resp:  20 28 20   Temp:  97.6 °F (36.4 °C)  98 °F (36.7 °C)   TempSrc:  Oral  Oral   SpO2:   90%      Creatinine   Date Value Ref Range Status   05/02/2020 0.55 (L) 0.76 - 1.27 mg/dL Final   05/01/2020 0.42 (L) 0.76 - 1.27 mg/dL Final   04/30/2020 0.47 (L) 0.76 - 1.27 mg/dL Final     BUN   Date Value Ref Range Status   05/02/2020 43 (H) 8 - 23 mg/dL Final     Estimated Creatinine Clearance: 70.1 mL/min (A) (by C-G formula based on SCr of 0.55 mg/dL (L)).    Lab Results   Component Value Date    WBC 12.42 (H) 05/02/2020     Temp Readings from Last 3 Encounters:   05/02/20 98 °F (36.7 °C) (Oral)     Baseline culture/source/susceptibility:  4/25: Scx-MRSA (3+)  5/1: Bcx-pending    Vancomycin Dosing History:   5/1: Vancomycin 1250 mg (20 mg/kg) loading dose IV once @ 1038  5/1: 1000 mg IV q12h @ 2148  5/2: 1000 mg IV q12h @ 1145    Assessment/Plan    1. Vancomycin is currently dosed at 1000 mg IV q12h. Will reschedule trough for tomorrow morning 5/3 before the 1100 dose to check if dose is appropriate since renal function is stable.    2. Will monitor serum creatinine every 24 hours since patient is on both vancomycin and Zosyn. SCr is stable this morning at 0.55. Encourage adequate hydration if appropriate to prevent nephrotoxicities. Monitor for decreased UOP, rash or other signs of vancomycin intolerance.    3. Pharmacy will continue to follow daily while on vancomycin and adjust as needed.     Mariposa Lynch, Pharm.D., " BC[facility]  Clinical Staff Pharmacist

## 2020-05-02 NOTE — PROGRESS NOTES
[facility]     Mar/chart reviewed  Follow up tracheostomy, acute hypoxemic respiratory failure, +sputum culture  Nods head some unable to give further subjective    Vital Sign Min/Max for last 24 hours  Temp  Min: 97.4 °F (36.3 °C)  Max: 99.8 °F (37.7 °C)   BP  Min: 104/49  Max: 130/61   Pulse  Min: 91  Max: 112   Resp  Min: 20  Max: 28   SpO2  Min: 90 %  Max: 95 %   Flow (L/min)  Min: 10  Max: 12   Weight  Min: 59.4 kg (131 lb)  Max: 59.4 kg (131 lb)     Appears ill, appears to respond appropriately   perrl, eomi, no icterus, normal conjunctiva  mmm, no jvd, trachea midline, neck supple,  chest decreased, coarse bilaterally, no crackles, no wheezes,   Tachy, regular  soft, nt, nd +bs,  no c/c/ e  Skin warm, dry no rashes    Labs: 5/2: reviewed:  Glucose 181  Bun 43  Cr 0.55  Na 142  Bicarb 26  Wbc 12.4  hgb 8.3  plts 241  5/1: 7.46/35/69  5/2: extensive bilateral infiltrates new since April 17; improving from a few days ago  5/1: procal 0.59; probnp 4363  Micro: 4/25:  MRSA and mixed gram negatives      ASSESSMENT  1. Tracheostomy status s/p replacement on 4/24/2020, Shiley size 6  2. Pharyngeal secretions  4.   Tracheobronchitis vs. Pneumonia -- continue antibiotics as outlined,   3. Hypernatremia  4. Sputum culture positive for gram-negative mackenzie and mrsa  5. Pharyngeal secretions/debris  7. Acute pulm edema -- agree with lasix  8. Rhabdomyolysis  9. Right lower lobe nodule -- outpatient follow up    Plan:  Agree with diuresis, continue antibiotics as outlined, pulmonary toilet as able.  Wean oxygen as able.   Try and improve pulm toilet very coarse    Patient is new to me today
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
## 2020-05-02 NOTE — PLAN OF CARE
Problem: Patient Care Overview  Goal: Plan of Care Review  Outcome: Ongoing (interventions implemented as appropriate)  Flowsheets (Taken 5/2/2020 0588)  Progress: no change  Plan of Care Reviewed With: patient  Outcome Summary: Pt NonVerbal with Trach frequent suctioning and oral care provided, TFeeds continuous at goal, Incontinent to urine     Problem: Restraint, Nonbehavioral (Nonviolent)  Goal: Rationale and Justification  Outcome: Ongoing (interventions implemented as appropriate)  Flowsheets (Taken 5/2/2020 1639)  Rationale and Justification: prevent harm to self; prevent line/tube removal; failure of less restrictive safety measures  Note:   Pt agitated pulling at lines and attempting OOB, NV restraints for Pt safety,

## 2020-05-03 NOTE — PLAN OF CARE
Problem: Patient Care Overview  Goal: Plan of Care Review  5/3/2020 1815 by Nataliia Naidu, RN  Outcome: Ongoing (interventions implemented as appropriate)

## 2020-05-03 NOTE — PROGRESS NOTES
"Pharmacokinetic Consult - Vancomycin Dosing (Follow-up Note)    Ajith Suresh is on day 3 pharmacy to dose vancomycin for MRSA PNA per Dr. Nils Husain's request. Goal trough: 15-20 mcg/mL.    Duration of Therapy: 7 days    Other Antimicrobials: Zosyn 3.375g IV q8h    Relevant clinical data and objective history reviewed:  74 y.o. male 165.1 cm (65\") 59.8 kg (131 lb 12.8 oz)    Vitals:    05/03/20 0330 05/03/20 0500 05/03/20 0820 05/03/20 0823   BP: 127/80   116/77   BP Location: Right arm   Right arm   Pulse: 98   105   Resp: 20 22 20   Temp: 99.1 °F (37.3 °C)      TempSrc: Oral      SpO2: 96%  93% 94%     Creatinine   Date Value Ref Range Status   05/03/2020 0.64 (L) 0.76 - 1.27 mg/dL Final   05/02/2020 0.55 (L) 0.76 - 1.27 mg/dL Final   05/01/2020 0.42 (L) 0.76 - 1.27 mg/dL Final     BUN   Date Value Ref Range Status   05/03/2020 54 (H) 8 - 23 mg/dL Final     Estimated Creatinine Clearance: 70.6 mL/min (A) (by C-G formula based on SCr of 0.64 mg/dL (L)).    Lab Results   Component Value Date    WBC 14.50 (H) 05/03/2020     Temp Readings from Last 3 Encounters:   05/03/20 99.1 °F (37.3 °C) (Oral)     Baseline culture/source/susceptibility:  4/25: Sputum cx-MRSA (3+)  5/1: Bcx-pending    Vancomycin Dosing History:   5/1: Vancomycin 1250 mg (20 mg/kg) loading dose IV once @ 1038  5/1: 1000 mg IV q12h @ 2148 5/2: 1000 mg IV q12h @ 1145, 2234   5/3 @ 1023: Vancomycin trough=17.9 mcg/mL (~12 hr level)    Assessment/Plan    1. Vancomycin is currently dosed at 1000 mg IV q12h. Trough came back within goal range at 17.9 mcg/mL (~12 hr level)-drawn appropriately. Will continue current dosing at this time. Would recommend getting a level if SCr increases unexpectedly or UOP decreases.    2. Will monitor serum creatinine at least every 48 hours per dosing recommendations. SCr is trending up slightly (0.42-->0.55-->0.64). Will continue to monitor daily. Encourage adequate hydration if appropriate to prevent " nephrotoxicities. Monitor for decreased UOP, rash or other signs of vancomycin intolerance.    3. Pharmacy will continue to follow daily while on vancomycin and adjust as needed.     Mariposa Lynch Pharm.D., [facility]  Clinical Staff Pharmacist

## 2020-05-03 NOTE — PROGRESS NOTES
[facility]      Mar/chart reviewed  Follow up tracheostomy, acute hypoxemic respiratory failure, +sputum culture  Nods head some unable to give further subjective    Vital Sign Min/Max for last 24 hours  Temp  Min: 98.1 °F (36.7 °C)  Max: 99.8 °F (37.7 °C)   BP  Min: 116/77  Max: 130/61   Pulse  Min: 97  Max: 105   Resp  Min: 20  Max: 22   SpO2  Min: 93 %  Max: 96 %   Flow (L/min)  Min: 10  Max: 10   Weight  Min: 59.8 kg (131 lb 12.8 oz)  Max: 59.8 kg (131 lb 12.8 oz)     Appears ill, appears to respond appropriately   perrl, eomi, no icterus, normal conjunctiva  mmm, no jvd, trachea midline, neck supple,  chest decreased, coarse bilaterally, no crackles, no wheezes,   Tachy, regular  soft, nt, nd +bs,  no c/c/ e  Skin warm, dry no rashes    Labs: 5/3: reviewed:  Glucose 176  Bun 54  Na 148  Bicarb 28  Wbc 14.5 (12)  hgb 8.5  plts 324    ASSESSMENT  1. Tracheostomy status s/p replacement on 4/24/2020, Shiley size 6  2. Pharyngeal secretions  4.   Tracheobronchitis vs. Pneumonia -- continue antibiotics as outlined,   3. Hypernatremia  4. Sputum culture positive for gram-negative mackenzie and mrsa  5. Pharyngeal secretions/debris  7. Acute pulm edema -- agree with lasix  8. Rhabdomyolysis  9. Right lower lobe nodule -- outpatient follow up     Plan:  Agree with diuresis, continue antibiotics as outlined, pulmonary toilet as able.  Wean oxygen as able.    pulm toilet
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
## 2020-05-03 NOTE — PLAN OF CARE
Problem: Patient Care Overview  Goal: Plan of Care Review  Outcome: Ongoing (interventions implemented as appropriate)  Flowsheets (Taken 5/3/2020 0500)  Progress: no change  Plan of Care Reviewed With: patient  Outcome Summary: Pt VSS with Trach Collar at 10, Frequent coughing with copious secretions suctioned via Trach, Pt incontinent, Tube Feeds continuous at goal,     Problem: Restraint, Nonbehavioral (Nonviolent)  Goal: Rationale and Justification  Outcome: Ongoing (interventions implemented as appropriate)  Flowsheets (Taken 5/3/2020 0500)  Rationale and Justification: prevent harm to self; prevent line/tube removal; failure of less restrictive safety measures  Note:   Pt anxiety high this shift, IV Haldol provided as needed per order, Pt attempting OOB, Pulling at lines monitors and clothing, Frequent checks and repositioning required

## 2020-05-04 NOTE — PLAN OF CARE
Problem: Patient Care Overview  Goal: Plan of Care Review  Outcome: Ongoing (interventions implemented as appropriate)  Flowsheets (Taken 5/4/2020 4701)  Progress: improving  Plan of Care Reviewed With: patient  Outcome Summary: agreed to PT, able to take steps forw and back , cues to keep on task; feel pt could use rwx next session, he apparently has one at home; plans SNU if any will accept

## 2020-05-04 NOTE — PROGRESS NOTES
"Adult Nutrition  Assessment/PES    Patient Name:  Ajith Suresh  YOB: 1945  MRN: [id number]  Admit Date:  4/23/2020    Assessment Date:  5/4/2020    Comments:  Nutrition follow up.  Patient continues on TFs of Impact Peptide 1.5 at goal rate of 50 ml/hr.  RN reports tolerating well with no residuals this am.    Continues in wrist restraints and posey vest.  Being diuresed, lasix.  Palliative is following.  + BM 5/2.    Due to the COVID pandemic, nutrition assessment completed based on review of electronic medical record and discussion with RN via secure chat. This RD currently working remotely and can be reached at [phone], via secure chat or email.     RD will continue to monitor.     Reason for Assessment     Row Name 05/04/20 0914          Reason for Assessment    Reason For Assessment  TF/PN;follow-up protocol         Anthropometrics     Row Name 05/04/20 0914 05/04/20 0500       Anthropometrics    Height  165.1 cm (65\")  --    Weight  --  58.9 kg (129 lb 13.6 oz)       Admit Weight    Admit Weight  -- 129# 5/4  --       Ideal Body Weight (IBW)    Ideal Body Weight (IBW) (kg)  62.51  --       Body Mass Index (BMI)    BMI Assessment  BMI 18.5-24.9: normal 21.56  --        Labs/Tests/Procedures/Meds     Row Name 05/04/20 0915          Labs/Procedures/Meds    Lab Results Reviewed  reviewed, pertinent     Lab Results Comments  Gluc, Creat, BUN, Alb, WBC, Hgb, Hct        Diagnostic Tests/Procedures    Diagnostic Test/Procedure Reviewed  reviewed, pertinent        Medications    Pertinent Medications Reviewed  reviewed, pertinent     Pertinent Medications Comments  FeSO4, lasix, humulin R, zosyn, vanc         Physical Findings     Row Name 05/04/20 0916          Physical Findings    Overall Physical Appearance  on oxygen therapy;other (see comments) trach; posey vest and wrist restraints     Gastrointestinal  feeding tube     Tubes  PEG     Skin  other (see comments) B=17, L arm skin tear, L " "arm traumatic wound, R arm skin tears, scapula abrasion         Estimated/Assessed Needs     Row Name 05/04/20 0914          Calculation Measurements    Height  165.1 cm (65\")         Nutrition Prescription Ordered     Row Name 05/04/20 0917          Nutrition Prescription PO    Current PO Diet  NPO        Nutrition Prescription EN    Enteral Route  PEG     Product  Impact Peptide 1.5 (Pivot 1.5)     TF Delivery Method  Continuous     Continuous TF Goal Rate (mL/hr)  50 mL/hr     Continuous TF Current Rate (mL/hr)  50 mL/hr     Water flush (mL)   200 mL     Water Flush Frequency  Other (comment) q 6 hours         Evaluation of Received Nutrient/Fluid Intake     Row Name 05/04/20 0917          Nutrient/Fluid Evaluation    Additional Documentation  Calories Evaluation (Group);Protein Evaluation (Group);Fluid Intake Evaluation (Group)        Calories Evaluation    Enteral Calories (kcal)  1800     % of Kcal Needs  100        Protein Evaluation    Enteral Protein (gm)  113     % of Protein Needs  100        Intake Assessment    Energy/Calorie Requirement Assessment  meeting needs     Protein Requirement Assessment  meeting needs     Fluid Requirement Assessment  meeting needs     Tolerance  tolerating        Fluid Intake Evaluation    Enteral (Free Water) Fluid (mL)  924     Free Water Flush Fluid (mL)  800     Total Free Water Intake (mL)  1724 mL        EN Evaluation    TF Residual  0 this am per RN     HOB  Greater than or equal to 30 degress         Problem/Interventions:    Intervention Goal     Row Name 05/04/20 0917          Intervention Goal    General  Maintain nutrition;Reduce/improve symptoms;Disease management/therapy;Nutrition support treatment     TF/PN  Maintain TF/PN     Weight  Maintain weight         Nutrition Intervention     Row Name 05/04/20 0918          Nutrition Intervention    RD/Tech Action  Follow Tx progress;Care plan reviewd     Recommended/Ordered  EN         Nutrition Prescription     Row " Name 05/04/20 0918          Nutrition Prescription EN    Enteral Prescription  Continue same protocol         Education/Evaluation     Row Name 05/04/20 0918          Education    Education  Will Instruct as appropriate        Monitor/Evaluation    Monitor  Per protocol;I&O;Pertinent labs;TF delivery/tolerance;Weight;Skin status;Symptoms     Education Follow-up  Reinforce PRN           Electronically signed by:  Janet Dowling RD  05/04/20 09:18

## 2020-05-04 NOTE — PROGRESS NOTES
[facility]      Mar/chart reviewed  Follow up tracheostomy, acute hypoxemic respiratory failure, +sputum culture  Nods head some unable to give further subjective    Vital Sign Min/Max for last 24 hours  Temp  Min: 98.2 °F (36.8 °C)  Max: 98.8 °F (37.1 °C)   BP  Min: 107/64  Max: 152/87   Pulse  Min: 78  Max: 92   Resp  Min: 18  Max: 20   SpO2  Min: 94 %  Max: 100 %   Flow (L/min)  Min: 10  Max: 10   Weight  Min: 58.9 kg (129 lb 13.6 oz)  Max: 58.9 kg (129 lb 13.6 oz)     Appears ill, appears to respond appropriately   perrl, eomi, no icterus, normal conjunctiva  mmm, no jvd, trachea midline, neck supple,  chest decreased, coarse bilaterally, no crackles, no wheezes,   Tachy, regular  soft, nt, nd +bs,  no c/c/ e  Skin warm, dry no rashes    Labs: 5/4: reviewed:  Glucose 244  Bun 60  Cr 0.63  Na 142  k 3.6  Bicarb 30  Wbc 11.5  hgb 8.1  plts 329    ASSESSMENT  1. Tracheostomy status s/p replacement on 4/24/2020, Shiley size 6  2. Pharyngeal secretions  4.   Tracheobronchitis vs. Pneumonia -- continue antibiotics as outlined,   3. Hypernatremia  4. Sputum culture positive for gram-negative mackenzie and mrsa  5. Pharyngeal secretions/debris  7. Acute pulm edema -- agree with lasix  8. Rhabdomyolysis  9. Right lower lobe nodule -- outpatient follow up     Plan:  Continue antibiotics, pulmonary toilet,  Needs a little longer in house I think.

## 2020-05-04 NOTE — NURSING NOTE
Palliative Care will sign off as patient wants to continue current treatments and return home. Noted he was not accepted by [facility].

## 2020-05-04 NOTE — PLAN OF CARE
Problem: Patient Care Overview  Goal: Plan of Care Review  Outcome: Ongoing (interventions implemented as appropriate)  Flowsheets (Taken 5/4/2020 1703)  Progress: no change  Plan of Care Reviewed With: patient     Problem: Restraint, Nonbehavioral (Nonviolent)  Goal: Rationale and Justification  Outcome: Ongoing (interventions implemented as appropriate)  Flowsheets (Taken 5/4/2020 1701)  Rationale and Justification: prevent harm to self; failure of less restrictive safety measures; prevent line/tube removal     Pt refusing to be compliant with how to get the restraints removed. He keeps trying to pull the IV pole over on himself.

## 2020-05-04 NOTE — PLAN OF CARE
Problem: Patient Care Overview  Goal: Plan of Care Review  Outcome: Ongoing (interventions implemented as appropriate)  Flowsheets (Taken 5/4/2020 1703)  Progress: no change  Plan of Care Reviewed With: patient     Pt has been on his call light every 20-30 mins today. Pt has tried to pull his IV pole onto hisself twice. He had to be suctioned 4 times. VSS. He won't listen to staff about how to use the call light or how to behave to get out of restraints.

## 2020-05-04 NOTE — PLAN OF CARE
Problem: Restraint, Nonbehavioral (Nonviolent)  Goal: Rationale and Justification  Outcome: Ongoing (interventions implemented as appropriate)  Flowsheets (Taken 5/3/2020 1812 by Nataliia Naidu, RN)  Rationale and Justification: prevent line/tube removal;prevent harm to self;failure of less restrictive safety measures  Goal: Nonbehavioral (Nonviolent) Restraint: Absence of Injury/Harm  Outcome: Ongoing (interventions implemented as appropriate)  Flowsheets (Taken 5/4/2020 0503)  Nonbehavioral (Nonviolent) Restraint: Absence of Injury/Harm: met  Goal: Nonbehavioral (Nonviolent) Restraint: Achievement of Discontinuation Criteria  Outcome: Ongoing (interventions implemented as appropriate)  Flowsheets (Taken 5/1/2020 0441)  Nonbehavioral (Nonviolent) Restraint: Achievement of Discontinuation Criteria: not met  Goal: Nonbehavioral (Nonviolent) Restraint: Preservation of Dignity and Wellbeing  Outcome: Ongoing (interventions implemented as appropriate)  Flowsheets (Taken 5/4/2020 0503)  Nonbehavioral (Nonviolent) Restraint: Preservation of Dignity and Wellbeing: met     Problem: Patient Care Overview  Goal: Plan of Care Review  Outcome: Ongoing (interventions implemented as appropriate)  Flowsheets  Taken 5/4/2020 0503  Progress: improving  Taken 5/3/2020 1477  Plan of Care Reviewed With: patient    Patient remains impulsive, and continues to attempt to maneuver his way out of the bottom of the posey and bed. Patient also continues to push away and swat at staff with his hands when attempting to suction his trach. Kitty vest and bilateral wrist restraints remain in place. Suction frequency seems to have decreased- X 2 per this RN overnight. Positive sepsis screen- AM lactic ordered. IV antibiotics per mar to piv. No c/o pain. No s/s of distress observed. AM labs pending. Will cont to monitor.

## 2020-05-04 NOTE — CONSULTS
At this time, patient is still on tube feeds/IV antibiotics. Goal of care have not been transitioned to comfort. Please notify us if patient transfers to  and needs re-evaluated for HSB. Thank you for the referral and for allowing me to participate in the care of this patient.    Aliya Crawford, SOSA  [facility]  [phone]

## 2020-05-04 NOTE — THERAPY TREATMENT NOTE
Acute Care - Physical Therapy Treatment Note  [facility]     Patient Name: Ajith Suresh  : 1945  MRN: [id number]  Today's Date: 2020             Admit Date: 2020    Visit Dx:    ICD-10-CM ICD-9-CM   1. Generalized weakness R53.1 780.79   2. Hypernatremia E87.0 276.0   3. Traumatic rhabdomyolysis, initial encounter (CMS/[facility]) T79.6XXA 958.6     Patient Active Problem List   Diagnosis   • Iron deficiency anemia   • Falls frequently   • Underweight BMI 18.1   • Abdominal pain   • Dysphagia   • Hypokalemia   • Iron deficiency anemia due to chronic blood loss   • Hyponatremia   • Generalized weakness   • Traumatic rhabdomyolysis (CMS/[facility])   • Hypernatremia   • Dehydration   • Metabolic encephalopathy   • Elevated troponin   • Elevated LFTs   • Tracheostomy present (CMS/[facility])   • JENNIFER (acute kidney injury) (CMS/[facility])       Therapy Treatment    Rehabilitation Treatment Summary     Row Name 20             Treatment Time/Intention    Discipline  physical therapy assistant  -      Document Type  therapy note (daily note)  -ANTHONY      Subjective Information  complains of;fatigue;weakness gives thumbs up to answer, trach, non-verbal  -      Mode of Treatment  individual therapy;physical therapy  -      Comment  impulsive w/initial mvmnt  -      Existing Precautions/Restrictions  fall  -      Recorded by [ANTHONY] Noa Quintanilla PTA 20 1233      Row Name 20 141             Bed Mobility Assessment/Treatment    Supine-Sit [place] (Bed Mobility)  2 person assist;minimum assist (75% patient effort)  -      Sit-Supine [place] (Bed Mobility)  2 person assist;minimum assist (75% patient effort);contact guard  -      Bed Mobility, Safety Issues  decreased use of arms for pushing/pulling  -      Assistive Device (Bed Mobility)  bed rails;draw sheet;head of bed elevated  -      Recorded by [ANTHONY] Noa Quintanilla PTA 20 9969      Row Name 20 1419              Sit-Stand Transfer    Sit-Stand [facility] (Transfers)  2 person assist;minimum assist (75% patient effort)  -      Assistive Device (Sit-Stand Transfers)  -- HHA, perf x 2  -JM      Recorded by [] Noa Quintanilla PTA 05/04/20 1453      Row Name 05/04/20 1410             Gait/Stairs Assessment/Training    [facility] Level (Gait)  2 person assist;minimum assist (75% patient effort)  -      Assistive Device (Gait)  -- HHA-2  -JM      Distance in Feet (Gait)  8 ft forw and back, seated rest, another 8ft forw and back  -JM      Deviations/Abnormal Patterns (Gait)  reno decreased;festinating/shuffling;base of support, narrow  -      Recorded by [] Nao Quintanilla PTA 05/04/20 1453      Row Name 05/04/20 1410             Therapeutic Exercise    Lower Extremity (Therapeutic Exercise)  LAQ (long arc quad), bilateral  -JM      Sets/Reps (Therapeutic Exercise)  10 reps-easily off task, assisted in counting  -JM      Recorded by [] Noa Quintanilla PTA 05/04/20 1453      Row Name 05/04/20 1410             Positioning and Restraints    Pre-Treatment Position  in bed  -JM      In Bed  fowlers;call light within reach;encouraged to call for assist;exit alarm on;notified ns  -      Restraints  reapplied:;soft limb;vest bilat wrists  -JM      Recorded by [] Noa Quintanilla PTA 05/04/20 1453      Row Name 05/04/20 1410             Pain Scale: FACES Pre/Post-Treatment    Pain: FACES Scale, Pretreatment  0-->no hurt  -      Recorded by [JM] Noa Quintanilla PTA 05/04/20 1453      Row Name                Wound 04/23/20 2200 Left anterior;lower;proximal arm Skin Tear    Wound - Properties Group Date first assessed: 04/23/20 [RG] Time first assessed: 2200 [RG] Present on Hospital Admission: Y [RG] Side: Left [RG] Orientation: anterior;lower;proximal [RG] Location: arm [RG] Primary Wound Type: Skin tear [RG] Recorded by:  [RG] Arsalan Agustin RN 04/24/20 0523    Row Name                Wound Left  anterior;lower;posterior arm Traumatic    Wound - Properties Group Side: Left [RG] Orientation: anterior;lower;posterior [RG] Location: arm [RG] Primary Wound Type: Traumatic [RG] Recorded by:  [RG] Arsalan Agustin RN 04/24/20 0524    Row Name                Wound Right anterior;upper;posterior arm Skin Tear    Wound - Properties Group Side: Right [RG] Orientation: anterior;upper;posterior [RG] Location: arm [RG] Primary Wound Type: Skin tear [RG] Recorded by:  [RG] Arsalan Agustin RN 04/24/20 0525    Row Name                Wound Right anterior;lower;posterior arm Skin Tear    Wound - Properties Group Side: Right [RG] Orientation: anterior;lower;posterior [RG] Location: arm [RG] Primary Wound Type: Skin tear [RG] Recorded by:  [RG] Arsalan Agustin RN 04/24/20 0525    Row Name                Wound 04/24/20 2000 Bilateral posterior;upper;lower scapula Abrasion    Wound - Properties Group Date first assessed: 04/24/20 [ML] Time first assessed: 2000 [ML] Present on Hospital Admission: N [ML] Side: Bilateral [ML] Orientation: posterior;upper;lower [ML] Location: scapula [ML] Primary Wound Type: Abrasion [ML] Recorded by:  [ML] Nely Painter RN 04/25/20 0356      User Key  (r) = Recorded By, (t) = Taken By, (c) = Cosigned By    Initials Name Effective Dates Discipline    Noa Addison PTA 03/07/18 -  PT    ML Nely Painter RN 06/16/16 -  Nurse    RG Arsalan Agustin RN 09/09/19 -  Nurse          Wound 04/23/20 2200 Left anterior;lower;proximal arm Skin Tear (Active)   Dressing Appearance dry;intact 5/4/2020  8:10 AM   Closure TAYLOR 5/4/2020  8:10 AM   Base dressing in place, unable to visualize 5/4/2020  8:10 AM   Periwound ecchymotic;dry;warm;redness 5/3/2020 11:47 PM   Periwound Temperature warm 5/3/2020 11:47 PM   Periwound Skin Turgor soft 5/3/2020 11:47 PM   Drainage Amount none 5/4/2020  8:10 AM       Wound Left anterior;lower;posterior arm Traumatic (Active)   Dressing Appearance dry;intact 5/4/2020  8:10 AM    Closure None 5/4/2020  8:10 AM   Base granulating;yellow;slough 5/3/2020 11:47 PM   Periwound dry;ecchymotic;redness 5/3/2020 11:47 PM   Periwound Temperature warm 5/3/2020 11:47 PM   Periwound Skin Turgor soft 5/3/2020 11:47 PM   Drainage Amount none 5/4/2020  8:10 AM   Care, Wound honey applied 5/4/2020  8:10 AM   Dressing Care, Wound multi-layer wrap 5/3/2020 11:47 PM       Wound Right anterior;upper;posterior arm Skin Tear (Active)   Dressing Appearance open to air 5/4/2020  8:10 AM   Closure None 5/4/2020  8:10 AM   Base dry 5/4/2020  8:10 AM   Periwound ecchymotic;redness 5/3/2020 11:47 PM   Periwound Temperature warm 5/3/2020 11:47 PM   Periwound Skin Turgor soft 5/3/2020 11:47 PM   Drainage Amount none 5/4/2020  8:10 AM       Wound Right anterior;lower;posterior arm Skin Tear (Active)   Dressing Appearance open to air 5/4/2020  8:10 AM   Closure None 5/4/2020  8:10 AM   Base dry;scab 5/4/2020  8:10 AM   Periwound ecchymotic;redness 5/3/2020 11:47 PM   Periwound Temperature warm 5/3/2020 11:47 PM   Periwound Skin Turgor soft 5/3/2020 11:47 PM   Drainage Amount none 5/4/2020  8:10 AM       Wound 04/24/20 2000 Bilateral posterior;upper;lower scapula Abrasion (Active)   Dressing Appearance open to air 5/4/2020  8:10 AM   Closure None 5/4/2020  8:10 AM   Base scab;red 5/4/2020  8:10 AM   Drainage Amount none 5/4/2020  8:10 AM       Rehab Goal Summary     Row Name 05/04/20 0858             Bed Mobility Goal 1 (PT)    Time Frame (Bed Mobility Goal 1, PT)  1 week  -RD      Progress/Outcomes (Bed Mobility Goal 1, PT)  goal ongoing  -RD         Transfer Goal 1 (PT)    Time Frame (Transfer Goal 1, PT)  1 week  -RD      Progress/Outcome (Transfer Goal 1, PT)  goal ongoing  -RD         Gait Training Goal 1 (PT)    Time Frame (Gait Training Goal 1, PT)  1 week  -RD      Progress/Outcome (Gait Training Goal 1, PT)  goal ongoing  -RD        User Key  (r) = Recorded By, (t) = Taken By, (c) = Cosigned By    Initials Name  Provider Type Discipline    Heather Robles, PT Physical Therapist PT              PT Recommendation and Plan     Plan of Care Reviewed With: patient  Progress: improving  Outcome Summary: agreed to PT, able to take steps forw and back , cues to keep on task; feel pt could use rwx next session, he apparently has one at home; plans SNU if any will accept  Outcome Measures     Row Name 05/04/20 1400             How much help from another person do you currently need...    Turning from your back to your side while in flat bed without using bedrails?  3  -JM      Moving from lying on back to sitting on the side of a flat bed without bedrails?  3  -JM      Moving to and from a bed to a chair (including a wheelchair)?  1  -JM      Standing up from a chair using your arms (e.g., wheelchair, bedside chair)?  3  -JM      Climbing 3-5 steps with a railing?  1  -JM      To walk in hospital room?  3  -JM      AM-PAC 6 Clicks Score (PT)  14  -JM        User Key  (r) = Recorded By, (t) = Taken By, (c) = Cosigned By    Initials Name Provider Type    Noa Addison PTA Physical Therapy Assistant         Time Calculation:   PT Charges     Row Name 05/04/20 1431 05/04/20 0858          Time Calculation    Start Time  1310  -JM  --     Stop Time  1328  -JM  --     Time Calculation (min)  18 min  -ANTHONY  --     PT Received On  05/04/20  -ANTHONY  --     PT - Next Appointment  05/06/20  -ANTHONY  --     PT Goal Re-Cert Due Date  --  05/11/20  -TC       User Key  (r) = Recorded By, (t) = Taken By, (c) = Cosigned By    Initials Name Provider Type    Heather Robles, PT Physical Therapist    Noa Addison PTA Physical Therapy Assistant        Therapy Charges for Today     Code Description Service Date Service Provider Modifiers Qty    91613869136 HC PT THER PROC EA 15 MIN 5/4/2020 Noa Quintanilla PTA GP 1    30260689620 HC PT THER SUPP EA 15 MIN 5/4/2020 Noa Quintanilla PTA GP 1          PT G-Codes  Outcome Measure Options:  AM-PAC 6 Clicks Daily Activity (OT)  AM-PAC 6 Clicks Score (PT): 14  AM-PAC 6 Clicks Score (OT): 12    Noa Quintanilla, PTA  5/4/2020
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
## 2020-05-05 PROBLEM — J18.9 PNEUMONIA: Status: ACTIVE | Noted: 2020-01-01

## 2020-05-05 PROBLEM — E87.0 HYPERNATREMIA: Status: RESOLVED | Noted: 2020-01-01 | Resolved: 2020-01-01

## 2020-05-05 PROBLEM — N17.9 AKI (ACUTE KIDNEY INJURY) (HCC): Status: RESOLVED | Noted: 2020-01-01 | Resolved: 2020-01-01

## 2020-05-05 NOTE — PROGRESS NOTES
Continued Stay Note  [facility]     Patient Name: Ajith Suresh  MRN: [id number]  Today's Date: 5/5/2020    Admit Date: 4/23/2020    Discharge Plan     Row Name 05/05/20 1532       Plan    Plan   Pt is not appropriate for home with HH @ this time.  CCP unable to place @ rehab due to pt's hx, spoke with Eulalio, he was able to email Living will, copy on chart and scanned to [facility]   Hilary Walker RN/CCP        Discharge Codes    No documentation.             Hilary Walker, RN
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
## 2020-05-05 NOTE — PLAN OF CARE
Problem: Patient Care Overview  Goal: Discharge Needs Assessment  Outcome: Ongoing (interventions implemented as appropriate)     Problem: Restraint, Nonbehavioral (Nonviolent)  Goal: Rationale and Justification  Outcome: Ongoing (interventions implemented as appropriate)  Flowsheets (Taken 5/5/2020 1703)  Rationale and Justification: prevent line/tube removal; prevent harm to self; failure of less restrictive safety measures     Spoke with patient many times about how to get out of his restraints. Pt is still trying to climb out of bed if staff isn't in his room the minute he puts on his call light. Pt is still on IV antibiotics, VSS, Pt is still pushing his call light every 20 minutes just for staff to be in the room. He wants a mouth swab every 20 minutes. Tried to loosen the restraints and he tried to pull the IV pole onto the bed. Pt is having pink/red urine. A UA was sent down-results pending.

## 2020-05-05 NOTE — PROGRESS NOTES
[facility]      Mar/chart reviewed  Follow up tracheostomy, acute hypoxemic respiratory failure, +sputum culture  Nods head some unable to give further subjective    Vital Sign Min/Max for last 24 hours  Temp  Min: 97.8 °F (36.6 °C)  Max: 99.1 °F (37.3 °C)   BP  Min: 118/62  Max: 129/76   Pulse  Min: 76  Max: 96   Resp  Min: 18  Max: 20   SpO2  Min: 93 %  Max: 100 %   Flow (L/min)  Min: 8  Max: 12   Weight  Min: 58.5 kg (128 lb 15.5 oz)  Max: 58.5 kg (128 lb 15.5 oz)     Appears ill, appears to respond appropriately   perrl, eomi, no icterus, normal conjunctiva  mmm, no jvd, trachea midline, neck supple,  chest decreased, much less rhonchi bilaterally, no crackles, no wheezes,   Tachy, regular  soft, nt, nd +bs,  no c/c/ e  Skin warm, dry no rashes    ASSESSMENT  1. Tracheostomy status s/p replacement on 4/24/2020, Shiley size 6  2. Pharyngeal secretions  4.   Tracheobronchitis vs. Pneumonia -- continue antibiotics as outlined,   3. Hypernatremia  4. Sputum culture positive for gram-negative mackenzie and mrsa  5. Pharyngeal secretions/debris  7. Acute pulm edema -- agree with lasix  8. Rhabdomyolysis  9. Right lower lobe nodule -- outpatient follow up     Plan:  Continue antibiotics, pulmonary toilet,  Could probably finish antibiotics out of hospital if needed.  If still doing this well tomorrow, I would be ok with d/c if antibiotics can be finished out of hospital.

## 2020-05-05 NOTE — PLAN OF CARE
Problem: Patient Care Overview  Goal: Plan of Care Review  Outcome: Ongoing (interventions implemented as appropriate)  Flowsheets (Taken 5/5/2020 0518)  Progress: no change  Plan of Care Reviewed With: patient  Outcome Summary: Patient still produces a lot of secretions and needed to be suctioned multiple times. Had multiple loose bowel movement during shift. Patients still pulls on tubings and restraints kept in place, no injury noted.  Goal: Individualization and Mutuality  Outcome: Ongoing (interventions implemented as appropriate)  Goal: Discharge Needs Assessment  Outcome: Ongoing (interventions implemented as appropriate)  Goal: Interprofessional Rounds/Family Conf  Outcome: Ongoing (interventions implemented as appropriate)     Problem: Fall Risk (Adult)  Goal: Absence of Fall  Outcome: Ongoing (interventions implemented as appropriate)     Problem: Skin Injury Risk (Adult)  Goal: Skin Health and Integrity  Outcome: Ongoing (interventions implemented as appropriate)     Problem: Suicide Risk (Adult)  Goal: Strength-Based Wellness/Recovery  Outcome: Ongoing (interventions implemented as appropriate)  Goal: Physical Safety  Outcome: Ongoing (interventions implemented as appropriate)     Problem: Nutrition, Enteral (Adult)  Goal: Signs and Symptoms of Listed Potential Problems Will be Absent, Minimized or Managed (Nutrition, Enteral)  Outcome: Ongoing (interventions implemented as appropriate)     Problem: Restraint, Nonbehavioral (Nonviolent)  Goal: Rationale and Justification  Outcome: Ongoing (interventions implemented as appropriate)  Flowsheets (Taken 5/5/2020 0518)  Rationale and Justification: prevent harm to self; prevent line/tube removal; failure of less restrictive safety measures  Goal: Nonbehavioral (Nonviolent) Restraint: Absence of Injury/Harm  Outcome: Ongoing (interventions implemented as appropriate)  Flowsheets (Taken 5/5/2020 0518)  Nonbehavioral (Nonviolent) Restraint: Absence of  Injury/Harm: met  Goal: Nonbehavioral (Nonviolent) Restraint: Achievement of Discontinuation Criteria  Outcome: Ongoing (interventions implemented as appropriate)  Flowsheets (Taken 5/5/2020 0518)  Nonbehavioral (Nonviolent) Restraint: Achievement of Discontinuation Criteria: not met  Goal: Nonbehavioral (Nonviolent) Restraint: Preservation of Dignity and Wellbeing  Outcome: Ongoing (interventions implemented as appropriate)  Flowsheets (Taken 5/5/2020 0518)  Nonbehavioral (Nonviolent) Restraint: Preservation of Dignity and Wellbeing: met     Problem: Pneumonia (Adult)  Goal: Signs and Symptoms of Listed Potential Problems Will be Absent, Minimized or Managed (Pneumonia)  Outcome: Ongoing (interventions implemented as appropriate)

## 2020-05-06 NOTE — PROGRESS NOTES
"Adult Nutrition  Assessment/PES    Patient Name:  Ajith Suresh  YOB: 1945  MRN: [id number]  Admit Date:  4/23/2020    Assessment Date:  5/6/2020    Comments:  Nutrition follow up.  TFs of Impact Peptide 1.5 continue at goal rate of 50 ml/hr.  Tolerating well with no residuals.  + BM 5/5.  Not appropriate for HH or rehab per CCP notes.  Remains in restraints.    Due to the COVID pandemic, nutrition assessment completed based on review of electronic medical record. This RD currently working remotely and can be reached at [phone], via secure chat or email.     RD will continue to monitor.     Reason for Assessment     Row Name 05/06/20 0941          Reason for Assessment    Reason For Assessment  TF/PN;follow-up protocol         Anthropometrics     Row Name 05/06/20 0941 05/06/20 0595       Anthropometrics    Height  165.1 cm (65\")  --    Weight  --  57.6 kg (126 lb 14.4 oz)       Admit Weight    Admit Weight  -- 126# 5/6  --       Ideal Body Weight (IBW)    Ideal Body Weight (IBW) (kg)  62.51  --       Body Mass Index (BMI)    BMI Assessment  BMI 18.5-24.9: normal 21.09  --        Labs/Tests/Procedures/Meds     Row Name 05/06/20 0941          Labs/Procedures/Meds    Lab Results Reviewed  reviewed, pertinent     Lab Results Comments  Gluc, Creat, BUN, WBC, Hgb, Hct, Platelets        Diagnostic Tests/Procedures    Diagnostic Test/Procedure Reviewed  reviewed, pertinent        Medications    Pertinent Medications Reviewed  reviewed, pertinent     Pertinent Medications Comments  FeSO4, humalog, zosyn, vanc         Physical Findings     Row Name 05/06/20 0961          Physical Findings    Overall Physical Appearance  on oxygen therapy;other (see comments) trach; restraints     Gastrointestinal  feeding tube     Tubes  PEG     Skin  other (see comments) B=13, L arm skin tear, L arm traumatic wound, R arm skin tears, scapula abrasion         Estimated/Assessed Needs     Row Name 05/06/20 0941       " "   Calculation Measurements    Height  165.1 cm (65\")         Nutrition Prescription Ordered     Row Name 05/06/20 0959          Nutrition Prescription PO    Current PO Diet  NPO        Nutrition Prescription EN    Enteral Route  PEG     Product  Impact Peptide 1.5 (Pivot 1.5)     TF Delivery Method  Continuous     Continuous TF Goal Rate (mL/hr)  50 mL/hr     Continuous TF Current Rate (mL/hr)  50 mL/hr     Water flush (mL)   200 mL     Water Flush Frequency  Other (comment) q 6 hours         Evaluation of Received Nutrient/Fluid Intake     Row Name 05/06/20 0959          Nutrient/Fluid Evaluation    Additional Documentation  Calories Evaluation (Group);Protein Evaluation (Group);Fluid Intake Evaluation (Group)        Calories Evaluation    Enteral Calories (kcal)  1800     % of Kcal Needs  100        Protein Evaluation    Enteral Protein (gm)  113     % of Protein Needs  100        Intake Assessment    Energy/Calorie Requirement Assessment  meeting needs     Protein Requirement Assessment  meeting needs     Fluid Requirement Assessment  meeting needs     Tolerance  tolerating        Fluid Intake Evaluation    Enteral (Free Water) Fluid (mL)  924     Free Water Flush Fluid (mL)  800     Total Free Water Intake (mL)  1724 mL        EN Evaluation    TF Residual  0     HOB  Greater than or equal to 30 degress         Problem/Interventions:    Intervention Goal     Row Name 05/06/20 1000          Intervention Goal    General  Maintain nutrition;Reduce/improve symptoms;Disease management/therapy;Meet nutritional needs for age/condition;Nutrition support treatment     TF/PN  Maintain TF/PN     Weight  Maintain weight         Nutrition Intervention     Row Name 05/06/20 1001          Nutrition Intervention    RD/Tech Action  Follow Tx progress;Care plan reviewd     Recommended/Ordered  EN         Nutrition Prescription     Row Name 05/06/20 1001          Nutrition Prescription EN    Enteral Prescription  Continue same " protocol         Education/Evaluation     Row Name 05/06/20 1001          Education    Education  Will Instruct as appropriate        Monitor/Evaluation    Monitor  Per protocol;I&O;Pertinent labs;TF delivery/tolerance;Weight;Skin status;Symptoms     Education Follow-up  Reinforce PRN           Electronically signed by:  Janet Dowling RD  05/06/20 10:02
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
## 2020-05-06 NOTE — PROGRESS NOTES
"Pharmacokinetic Evaluation: Vancomycin IV  Patient: Ajith Suresh  Admission Date:   MRN: [id number]  : 1945    Day of vancomycin therapy: 6  Consult for Dr. Nils Husain  Treating: MRSA PNA  Goal trough: 15-20 mcg/mL     Current regimen: Vancomycin 1000 mg IV q12hrs  Other antimicrobials: Zosyn 3.375 gm IV q8hrs EI    Relevant clinical data and objective history reviewed:  74 y.o. male 165.1 cm (65\") 57.6 kg (126 lb 14.4 oz)  Body mass index is 21.12 kg/m².  Results from last 7 days   Lab Units 20  0351 20  0423 20  0500   CREATININE mg/dL 0.57* 0.58* 0.63*     Estimated Creatinine Clearance: 68 mL/min (A) (by C-G formula based on SCr of 0.57 mg/dL (L)).    WBC   Date Value Ref Range Status   2020 13.78 (H) 3.40 - 10.80 10*3/mm3 Final   2020 12.30 (H) 3.40 - 10.80 10*3/mm3 Final   2020 11.53 (H) 3.40 - 10.80 10*3/mm3 Final     Temp:  [96.6 °F (35.9 °C)-98.5 °F (36.9 °C)] 97.1 °F (36.2 °C)  Heart Rate:  [77-96] 88  Resp:  [16-20] 20  BP: (125-137)/(67-77) 137/77    Intake/Output Summary (Last 24 hours) at 2020 0825  Last data filed at 2020 0639  Gross per 24 hour   Intake 1200 ml   Output --   Net 1200 ml     Baseline labs/radiology/cultures:    Labs:   Procalcitonin: 0.59   Lactate: 1.2   UA: 13-20 WBC/2+ Leuk     Radiology:    CXR: There is extensive bilateral pneumonia showing very slight improvement from yesterday's exam. The heart remains slightly enlarged. A tracheostomy tube is in place without change.     Cultures:    SARS-CoV-2: Neg.   Sputum cx, ET suction: MRSA (3+)   Blood cx: NG x 4 days   Urine cx: In process     Assessment/Plan:   PMH: Hyperlipidemia, Hypertension, and Suicide attempt (2016).      1. Scr stable  2. Vanc trough this AM slightly above desired range  3. HOLD vancomycin IV this AM  4. Start Vancomycin 750 mg IV q12hrs today at 18:00   5. No further vanc levels ordered since only 3 more doses due, " unless increase in Scr    Lab Results   Component Value Date    VANCO TROUGH 22.20 mcg/mL 05/06/2020 @ 09:19 am     Vancomycin IV Dosing & Levels History:  5/1: 10:38 1250 mg x1  5/1: 21:48 1000 mg q12hr  5/2: 11:45, 22:34               5/3 10:23 Trough= 17.9 mcg/mL   5/3: 14:44 (late), 22:03  5/4: 11:06, 23:03  5/5: 11:53, 21:01   5/6 09:19 Trough: 22.20 mcg/mL  5/5: 18:00 (due) 750 mg q12hrs    Thank you for your consult,    Cinda Dixon RPH

## 2020-05-06 NOTE — PLAN OF CARE
Problem: Patient Care Overview  Goal: Plan of Care Review  Outcome: Ongoing (interventions implemented as appropriate)  Flowsheets (Taken 5/6/2020 1756)  Progress: no change  Plan of Care Reviewed With: patient  Outcome Summary: Pt has been suctioned 4 times on day shift; pt is good but wants staff in room at all times and sets off his bed alarm on purpose to get attention.  Pt is demanding and understands everything but is attention seeking.  Pt is stable at this time and will continue to monitor.  Goal: Individualization and Mutuality  Outcome: Ongoing (interventions implemented as appropriate)  Goal: Discharge Needs Assessment  Outcome: Ongoing (interventions implemented as appropriate)  Flowsheets (Taken 5/6/2020 1756)  Concerns to be Addressed: discharge planning; basic needs; adjustment to diagnosis/illness; compliance issue; physical/sexual safety; mental health  Readmission Within the Last 30 Days: no previous admission in last 30 days  Patient/Family Anticipated Services at Transition: skilled nursing  Patient/Family Anticipates Transition to: long term care facility  Goal: Interprofessional Rounds/Family Conf  Outcome: Ongoing (interventions implemented as appropriate)  Flowsheets (Taken 5/6/2020 1756)  Participants: nursing; physician;      Problem: Fall Risk (Adult)  Goal: Absence of Fall  Outcome: Ongoing (interventions implemented as appropriate)  Flowsheets (Taken 5/6/2020 1756)  Absence of Fall: making progress toward outcome     Problem: Skin Injury Risk (Adult)  Goal: Skin Health and Integrity  Outcome: Ongoing (interventions implemented as appropriate)  Flowsheets (Taken 5/6/2020 1756)  Skin Health and Integrity: making progress toward outcome     Problem: Suicide Risk (Adult)  Goal: Strength-Based Wellness/Recovery  Outcome: Ongoing (interventions implemented as appropriate)  Flowsheets (Taken 5/6/2020 1756)  Strength-Based Wellness/Recovery: making progress toward outcome  Goal:  Physical Safety  Outcome: Ongoing (interventions implemented as appropriate)  Flowsheets (Taken 5/6/2020 1756)  Physical Safety: making progress toward outcome     Problem: Nutrition, Enteral (Adult)  Goal: Signs and Symptoms of Listed Potential Problems Will be Absent, Minimized or Managed (Nutrition, Enteral)  Outcome: Ongoing (interventions implemented as appropriate)  Flowsheets (Taken 5/6/2020 1756)  Problems Assessed (Enteral Nutrition): all  Problems Present (Enteral Nutrition): malnutrition; aspiration     Problem: Pneumonia (Adult)  Goal: Signs and Symptoms of Listed Potential Problems Will be Absent, Minimized or Managed (Pneumonia)  Outcome: Ongoing (interventions implemented as appropriate)  Flowsheets  Taken 5/6/2020 1756 by Ann Corrales RN  Problems Assessed (Pneumonia): all  Taken 5/4/2020 0503 by Nely Stallings RN  Problems Present (Pneumonia): infection progression;respiratory compromise

## 2020-05-06 NOTE — PLAN OF CARE
Problem: Patient Care Overview  Goal: Plan of Care Review  Outcome: Ongoing (interventions implemented as appropriate)    Patient continued to have gross hematuria throughout the shift with blood specks. Patient reported burning with urination. No respiratory issues overnight. Vital signs stale.  Goal: Individualization and Mutuality  Outcome: Ongoing (interventions implemented as appropriate)  Goal: Discharge Needs Assessment  Outcome: Ongoing (interventions implemented as appropriate)  Goal: Interprofessional Rounds/Family Conf  Outcome: Ongoing (interventions implemented as appropriate)     Problem: Fall Risk (Adult)  Goal: Absence of Fall  Outcome: Ongoing (interventions implemented as appropriate)     Problem: Skin Injury Risk (Adult)  Goal: Skin Health and Integrity  Outcome: Ongoing (interventions implemented as appropriate)     Problem: Suicide Risk (Adult)  Goal: Strength-Based Wellness/Recovery  Outcome: Ongoing (interventions implemented as appropriate)  Goal: Physical Safety  Outcome: Ongoing (interventions implemented as appropriate)     Problem: Nutrition, Enteral (Adult)  Goal: Signs and Symptoms of Listed Potential Problems Will be Absent, Minimized or Managed (Nutrition, Enteral)  Outcome: Ongoing (interventions implemented as appropriate)     Problem: Restraint, Nonbehavioral (Nonviolent)  Goal: Rationale and Justification  Outcome: Ongoing (interventions implemented as appropriate)  Flowsheets (Taken 5/6/2020 0318)  Rationale and Justification: prevent harm to self;prevent line/tube removal;failure of less restrictive safety measures  Goal: Nonbehavioral (Nonviolent) Restraint: Absence of Injury/Harm  Outcome: Ongoing (interventions implemented as appropriate)  Flowsheets (Taken 5/6/2020 0318)  Nonbehavioral (Nonviolent) Restraint: Absence of Injury/Harm: met  Goal: Nonbehavioral (Nonviolent) Restraint: Achievement of Discontinuation Criteria  Outcome: Ongoing (interventions implemented as  appropriate)  Flowsheets (Taken 5/6/2020 0318)  Nonbehavioral (Nonviolent) Restraint: Achievement of Discontinuation Criteria: not met  Goal: Nonbehavioral (Nonviolent) Restraint: Preservation of Dignity and Wellbeing  Outcome: Ongoing (interventions implemented as appropriate)  Flowsheets (Taken 5/6/2020 0318)  Nonbehavioral (Nonviolent) Restraint: Preservation of Dignity and Wellbeing: met     Problem: Pneumonia (Adult)  Goal: Signs and Symptoms of Listed Potential Problems Will be Absent, Minimized or Managed (Pneumonia)  Outcome: Ongoing (interventions implemented as appropriate)

## 2020-05-06 NOTE — PROGRESS NOTES
[facility]      Mar/chart reviewed  Follow up tracheostomy, acute hypoxemic respiratory failure, +sputum culture  Nods head some unable to give further subjective    Vital Sign Min/Max for last 24 hours  Temp  Min: 96.6 °F (35.9 °C)  Max: 98.5 °F (36.9 °C)   BP  Min: 112/80  Max: 137/77   Pulse  Min: 77  Max: 95   Resp  Min: 16  Max: 20   SpO2  Min: 91 %  Max: 97 %   Flow (L/min)  Min: 10  Max: 10   Weight  Min: 57.6 kg (126 lb 14.4 oz)  Max: 57.6 kg (126 lb 14.4 oz)     Appears ill, appears to respond appropriately   perrl, eomi, no icterus, normal conjunctiva  mmm, no jvd, trachea midline, neck supple,  chest decreased, much less rhonchi bilaterally, no crackles, no wheezes,   Tachy, regular  soft, nt, nd +bs,  no c/c/ e  Skin warm, dry no rashes     ASSESSMENT  1. Tracheostomy status s/p replacement on 4/24/2020, Shiley size 6  2. Pharyngeal secretions  4.   Tracheobronchitis vs. Pneumonia -- continue antibiotics as outlined,   3. Hypernatremia  4. Sputum culture positive for gram-negative mackenzie and mrsa  5. Pharyngeal secretions/debris  7. Acute pulm edema -- agree with lasix  8. Rhabdomyolysis  9. Right lower lobe nodule -- outpatient follow up     Plan:  Continue antibiotics, pulmonary toilet,  Could probably finish antibiotics out of hospital if needed.  I would be ok with d/c if antibiotics can be finished out of hospital

## 2020-05-06 NOTE — NURSING NOTE
Family called no answer at son feliz, daughter nor spouse number. Left message for family to return call.
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
## 2020-05-07 NOTE — PROGRESS NOTES
[facility]      Mar/chart reviewed  Follow up tracheostomy, acute hypoxemic respiratory failure, +sputum culture  Events noted,  +cough    Vital Sign Min/Max for last 24 hours  Temp  Min: 96.5 °F (35.8 °C)  Max: 97.9 °F (36.6 °C)   BP  Min: 112/80  Max: 155/82   Pulse  Min: 89  Max: 113   Resp  Min: 20  Max: 24   SpO2  Min: 85 %  Max: 98 %   Flow (L/min)  Min: 10  Max: 11   Weight  Min: 59.3 kg (130 lb 11.2 oz)  Max: 59.3 kg (130 lb 11.2 oz)   Appears ill, appears to respond appropriately   perrl, eomi, no icterus, normal conjunctiva  mmm, no jvd, trachea midline, neck supple,  chest decreased, much less rhonchi bilaterally, no crackles, no wheezes,   Tachy, regular  soft, nt, nd +bs,  no c/c/ e  Skin warm, dry no rashes     ASSESSMENT  1. Tracheostomy status s/p replacement on 4/24/2020, Shiley size 6  2. Pharyngeal secretions  4.   Tracheobronchitis vs. Pneumonia -- continue antibiotics as outlined,   3. Hypernatremia  4. Sputum culture positive for gram-negative mackenzie and mrsa  5. Pharyngeal secretions/debris  7. Acute pulm edema -- agree with lasix  8. Rhabdomyolysis  9. Right lower lobe nodule -- outpatient follow up     Plan:  Continue antibiotics, pulmonary toilet,  Could probably finish antibiotics out of hospital if needed.  I would be ok with d/c if antibiotics can be finished out of hospital  Agree that palliative care would be appropriate if so desired.

## 2020-05-07 NOTE — PLAN OF CARE
Problem: Patient Care Overview  Goal: Plan of Care Review  Outcome: Ongoing (interventions implemented as appropriate)  Flowsheets  Taken 5/7/2020 1209  Plan of Care Reviewed With: patient  Taken 5/7/2020 1042  Outcome Summary: Pt seen for UE exercises and grooming.  Pt requires prompting and assist for all tasks.
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
## 2020-05-07 NOTE — PLAN OF CARE
"  Problem: Patient Care Overview  Goal: Plan of Care Review  Outcome: Ongoing (interventions implemented as appropriate)  Flowsheets (Taken 5/7/2020 0559)  Progress: declining  Plan of Care Reviewed With: patient  Note:   Heightened attention seeking behaviors tonight, put self on floor at 1940 last night, no injury was noted. Pt denied suicidal thoughts at the time of being found on floor. Restraint order obtained. This morning, pt requested pen and paper and wrote \"dead\" on paper. Upon assessment of suicidal thoughts, pt gave nonverbal indicators that he was suicidal. Access was notified immediately, as was house supervisor. Sitter ordered and suicide precautions initiated. Will continue to monitor.     Problem: Suicide Risk (Adult)  Goal: Strength-Based Wellness/Recovery  Outcome: Ongoing (interventions implemented as appropriate)  Flowsheets (Taken 5/7/2020 0621)  Strength-Based Wellness/Recovery: unable to achieve outcome  Note:   Suicide precautions and sitter at bedside initiated this morning for nonverbal indicators by patient that he was suicidal.     Problem: Suicide Risk (Adult)  Goal: Physical Safety  Outcome: Ongoing (interventions implemented as appropriate)  Flowsheets (Taken 5/7/2020 0621)  Physical Safety: making progress toward outcome  Note:   Remained free from physical harm.     Problem: Restraint, Nonbehavioral (Nonviolent)  Goal: Rationale and Justification  5/7/2020 0621 by Natasha Gerard, RN  Outcome: Ongoing (interventions implemented as appropriate)  Flowsheets (Taken 5/7/2020 0621)  Rationale and Justification: prevent harm to self; prevent line/tube removal; failure of less restrictive safety measures     Problem: Restraint, Nonbehavioral (Nonviolent)  Goal: Nonbehavioral (Nonviolent) Restraint: Absence of Injury/Harm  5/7/2020 0621 by Natasha Gerard, RN  Outcome: Ongoing (interventions implemented as appropriate)  Flowsheets (Taken 5/7/2020 0621)  Nonbehavioral (Nonviolent) Restraint: " Absence of Injury/Harm: met     Problem: Restraint, Nonbehavioral (Nonviolent)  Goal: Nonbehavioral (Nonviolent) Restraint: Achievement of Discontinuation Criteria  5/7/2020 0621 by Natasha Gerard, RN  Outcome: Ongoing (interventions implemented as appropriate)  Flowsheets (Taken 5/7/2020 0621)  Nonbehavioral (Nonviolent) Restraint: Achievement of Discontinuation Criteria: not met     Problem: Restraint, Nonbehavioral (Nonviolent)  Goal: Nonbehavioral (Nonviolent) Restraint: Preservation of Dignity and Wellbeing  5/7/2020 0621 by Natasha Gerrad, RN  Outcome: Ongoing (interventions implemented as appropriate)  Flowsheets (Taken 5/7/2020 0621)  Nonbehavioral (Nonviolent) Restraint: Preservation of Dignity and Wellbeing: met
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
## 2020-05-07 NOTE — PLAN OF CARE
Pt has sitter and straints for not leaving medical tubes; IV's alone, putting self on floor and writting wants to die.  Pt has been both uncooperative and cooperative depending on his mood.  Pt has been suctioned twice by RN.  Will continue to monitor.  Problem: Patient Care Overview  Goal: Plan of Care Review  Outcome: Ongoing (interventions implemented as appropriate)  Flowsheets  Taken 5/7/2020 1743 by Ann Corrales RN  Progress: declining  Plan of Care Reviewed With: patient;son  Taken 5/7/2020 1042 by Sara Aguilar OTR  Outcome Summary: Pt seen for UE exercises and grooming.  Pt requires prompting and assist for all tasks.   Goal: Individualization and Mutuality  Outcome: Ongoing (interventions implemented as appropriate)  Goal: Discharge Needs Assessment  Outcome: Ongoing (interventions implemented as appropriate)  Flowsheets  Taken 5/6/2020 1756  Concerns to be Addressed: discharge planning;basic needs;adjustment to diagnosis/illness;compliance issue;physical/sexual safety;mental health  Taken 5/7/2020 1743  Readmission Within the Last 30 Days: no previous admission in last 30 days  Patient/Family Anticipates Transition to: inpatient hospice  Goal: Interprofessional Rounds/Family Conf  Outcome: Ongoing (interventions implemented as appropriate)  Flowsheets (Taken 5/7/2020 1743)  Participants: physician; ; nursing; physical therapy; respiratory therapy; patient     Problem: Fall Risk (Adult)  Goal: Absence of Fall  Outcome: Ongoing (interventions implemented as appropriate)  Flowsheets (Taken 5/7/2020 1743)  Absence of Fall: making progress toward outcome     Problem: Skin Injury Risk (Adult)  Goal: Skin Health and Integrity  Outcome: Ongoing (interventions implemented as appropriate)  Flowsheets (Taken 5/7/2020 1743)  Skin Health and Integrity: making progress toward outcome     Problem: Suicide Risk (Adult)  Goal: Strength-Based Wellness/Recovery  Outcome: Ongoing (interventions implemented  as appropriate)  Flowsheets (Taken 5/7/2020 1743)  Strength-Based Wellness/Recovery: unable to achieve outcome  Goal: Physical Safety  Outcome: Ongoing (interventions implemented as appropriate)  Flowsheets (Taken 5/7/2020 1743)  Physical Safety: making progress toward outcome     Problem: Nutrition, Enteral (Adult)  Goal: Signs and Symptoms of Listed Potential Problems Will be Absent, Minimized or Managed (Nutrition, Enteral)  Outcome: Ongoing (interventions implemented as appropriate)  Flowsheets (Taken 5/6/2020 1756)  Problems Assessed (Enteral Nutrition): all  Problems Present (Enteral Nutrition): malnutrition;aspiration     Problem: Restraint, Nonbehavioral (Nonviolent)  Goal: Rationale and Justification  Outcome: Ongoing (interventions implemented as appropriate)  Flowsheets (Taken 5/7/2020 0621 by Natasha Gerard, RN)  Rationale and Justification: prevent harm to self;prevent line/tube removal;failure of less restrictive safety measures  Goal: Nonbehavioral (Nonviolent) Restraint: Absence of Injury/Harm  Outcome: Ongoing (interventions implemented as appropriate)  Flowsheets (Taken 5/7/2020 1743)  Nonbehavioral (Nonviolent) Restraint: Absence of Injury/Harm: met  Goal: Nonbehavioral (Nonviolent) Restraint: Achievement of Discontinuation Criteria  Outcome: Ongoing (interventions implemented as appropriate)  Flowsheets (Taken 5/7/2020 1743)  Nonbehavioral (Nonviolent) Restraint: Achievement of Discontinuation Criteria: not met  Goal: Nonbehavioral (Nonviolent) Restraint: Preservation of Dignity and Wellbeing  Outcome: Ongoing (interventions implemented as appropriate)  Flowsheets (Taken 5/7/2020 1743)  Nonbehavioral (Nonviolent) Restraint: Preservation of Dignity and Wellbeing: met     Problem: Pneumonia (Adult)  Goal: Signs and Symptoms of Listed Potential Problems Will be Absent, Minimized or Managed (Pneumonia)  Outcome: Ongoing (interventions implemented as appropriate)  Flowsheets  Taken 5/6/2020 1756 by  Ann Corrales, RN  Problems Assessed (Pneumonia): all  Taken 5/4/2020 0503 by Nely Stallings RN  Problems Present (Pneumonia): infection progression;respiratory compromise

## 2020-05-07 NOTE — PROGRESS NOTES
Continued Stay Note  [facility]     Patient Name: Ajith Suresh  MRN: [id number]  Today's Date: 5/7/2020    Admit Date: 4/23/2020    Discharge Plan     Row Name 05/07/20 1511       Plan    Plan  CCP will follow to assist as needed.  Plans are to move to 4p when bed aval.  Hilary Walker RN/CCP        Discharge Codes    No documentation.             Hilary Walker, RN

## 2020-05-08 PROBLEM — Z51.5 ENCOUNTER FOR PALLIATIVE CARE: Status: ACTIVE | Noted: 2020-01-01
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
## 2020-05-08 NOTE — PLAN OF CARE
Problem: Patient Care Overview  Goal: Plan of Care Review  Outcome: Ongoing (interventions implemented as appropriate)  Flowsheets  Taken 5/8/2020 1944  Progress: no change  Outcome Summary: Pt transferred to [facility] for palliative care, oriented to unit, sitter at bedside. Pt complained of SOB, congestion, meds given with good results. Restraints DC'd. Safety precautions in place  Taken 5/8/2020 5130  Plan of Care Reviewed With: patient

## 2020-05-08 NOTE — PLAN OF CARE
Problem: Patient Care Overview  Goal: Plan of Care Review  5/8/2020 0520 by Natasha Gerard RN  Outcome: Ongoing (interventions implemented as appropriate)  Flowsheets (Taken 5/8/2020 0520)  Progress: declining  Plan of Care Reviewed With: patient  Note:   Pt has rested tonight, was started on remeron by Dr. Messer. Medication has been effective in aiding with rest. Remains in wrist and posey restraints. Has sitter at bedside. TF continuous at 50/hr. Remains on vanc and zosyn. Awaiting hospice bed to open so pt may transfer there. Pt calm and cooperative tonight. Denies pain. Oxygen sats have remained above 93% all shift. Continuing to monitor.     Problem: Suicide Risk (Adult)  Goal: Strength-Based Wellness/Recovery  5/8/2020 0520 by Natasha Gerard RN  Outcome: Ongoing (interventions implemented as appropriate)     Problem: Suicide Risk (Adult)  Goal: Physical Safety  5/8/2020 0520 by Natasha Gerard RN  Outcome: Ongoing (interventions implemented as appropriate)     Problem: Restraint, Nonbehavioral (Nonviolent)  Goal: Rationale and Justification  5/8/2020 0520 by Natasha Gerard RN  Outcome: Ongoing (interventions implemented as appropriate)  Flowsheets (Taken 5/7/2020 0621)  Rationale and Justification: prevent harm to self;prevent line/tube removal;failure of less restrictive safety measures     Problem: Restraint, Nonbehavioral (Nonviolent)  Goal: Nonbehavioral (Nonviolent) Restraint: Absence of Injury/Harm  5/8/2020 0520 by Natasha Gerard RN  Outcome: Ongoing (interventions implemented as appropriate)  Flowsheets (Taken 5/8/2020 0520)  Nonbehavioral (Nonviolent) Restraint: Absence of Injury/Harm: met     Problem: Restraint, Nonbehavioral (Nonviolent)  Goal: Nonbehavioral (Nonviolent) Restraint: Achievement of Discontinuation Criteria  5/8/2020 0520 by Natasha Gerard RN  Outcome: Ongoing (interventions implemented as appropriate)  Flowsheets (Taken 5/8/2020 0520)  Nonbehavioral (Nonviolent)  Restraint: Achievement of Discontinuation Criteria: not met     Problem: Restraint, Nonbehavioral (Nonviolent)  Goal: Nonbehavioral (Nonviolent) Restraint: Preservation of Dignity and Wellbeing  5/8/2020 0520 by Natasha Gerard RN  Outcome: Ongoing (interventions implemented as appropriate)  Flowsheets (Taken 5/8/2020 0520)  Nonbehavioral (Nonviolent) Restraint: Preservation of Dignity and Wellbeing: met

## 2020-05-08 NOTE — PROGRESS NOTES
"Adult Nutrition  Assessment/PES    Patient Name:  Ajith Suresh  YOB: 1945  MRN: [id number]  Admit Date:  4/23/2020    Assessment Date:  5/8/2020    Comments:  Nutrition follow up.  Patient continues on TFs of Impact Peptide 1.5 at goal rate of 50 ml/hr.      Noted plans to transfer to palliative care unit.    Due to the COVID pandemic, nutrition assessment completed based on review of electronic medical record. This RD currently working remotely and can be reached at [phone], via secure chat or email.     RD will follow as needed.    Reason for Assessment     Row Name 05/08/20 0953          Reason for Assessment    Reason For Assessment  TF/PN;follow-up protocol         Nutrition/Diet History     Row Name 05/08/20 0953          Nutrition/Diet History    Typical Food/Fluid Intake  tolerating TFs, plans for transfer to  when bed available         Anthropometrics     Row Name 05/08/20 0953          Anthropometrics    Height  165.1 cm (65\")        Admit Weight    Admit Weight  -- 130# 5/7        Ideal Body Weight (IBW)    Ideal Body Weight (IBW) (kg)  62.51        Body Mass Index (BMI)    BMI Assessment  BMI 18.5-24.9: normal 21.71         Labs/Tests/Procedures/Meds     Row Name 05/08/20 0957          Labs/Procedures/Meds    Lab Results Reviewed  reviewed, pertinent        Diagnostic Tests/Procedures    Diagnostic Test/Procedure Reviewed  reviewed, pertinent        Medications    Pertinent Medications Reviewed  reviewed, pertinent           Estimated/Assessed Needs     Row Name 05/08/20 0953          Calculation Measurements    Height  165.1 cm (65\")         Nutrition Prescription Ordered     Row Name 05/08/20 0957          Nutrition Prescription PO    Current PO Diet  NPO        Nutrition Prescription EN    Enteral Route  PEG     Product  Impact Peptide 1.5 (Pivot 1.5)     TF Delivery Method  Continuous     Continuous TF Goal Rate (mL/hr)  50 mL/hr     Continuous TF Current Rate (mL/hr)  50 " mL/hr     Water flush (mL)   200 mL     Water Flush Frequency  Other (comment) q 6 hours         Evaluation of Received Nutrient/Fluid Intake     Row Name 05/08/20 1003          Nutrient/Fluid Evaluation    Additional Documentation  Calories Evaluation (Group);Protein Evaluation (Group);Fluid Intake Evaluation (Group)        Calories Evaluation    Enteral Calories (kcal)  1800     % of Kcal Needs  100        Protein Evaluation    Enteral Protein (gm)  113     % of Protein Needs  100        Intake Assessment    Energy/Calorie Requirement Assessment  meeting needs     Protein Requirement Assessment  meeting needs     Fluid Requirement Assessment  meeting needs     Tolerance  tolerating        Fluid Intake Evaluation    Enteral (Free Water) Fluid (mL)  924     Free Water Flush Fluid (mL)  800     Total Free Water Intake (mL)  1724 mL               Problem/Interventions:          Intervention Goal     Row Name 05/08/20 1003          Intervention Goal    General  Maintain nutrition;Palliative Care     TF/PN  Maintain TF/PN     Weight  Maintain weight         Nutrition Intervention     Row Name 05/08/20 1003          Nutrition Intervention    RD/Tech Action  Follow Tx progress;Care plan reviewd     Recommended/Ordered  EN         Nutrition Prescription     Row Name 05/08/20 1003          Nutrition Prescription EN    Enteral Prescription  Continue same protocol         Education/Evaluation     Row Name 05/08/20 1004          Monitor/Evaluation    Monitor  Per protocol           Electronically signed by:  Janet Dowling RD  05/08/20 10:04

## 2020-05-08 NOTE — PLAN OF CARE
Problem: Patient Care Overview  Goal: Plan of Care Review  Outcome: Ongoing (interventions implemented as appropriate)  Flowsheets (Taken 5/8/2020 8844)  Progress: declining  Plan of Care Reviewed With: patient  Note:   Pt has rested tonight, was started on remeron by Dr. Messer. Medication has been effective in aiding with rest. Remains in wrist and posey restraints. Has sitter at bedside. TF continuous at 50/hr. Remains on vanc and zosyn. Awaiting hospice bed to open so pt may transfer there. Pt calm and cooperative tonight. Denies pain. Oxygen sats have remained above 93% all shift. Continuing to monitor.

## 2020-05-08 NOTE — NURSING NOTE
"Access Center follow-up.  Per RN Remeron has been effective in allowing patient to sleep as he rested well last night. No behavioral issues today, still in wrist and posey restraints with sitter, son is on board with palliative.  Upon entering room, patient awake, appears calm, resting in bed.  When asked about SI patient moved head in a \"no\" fashion.     AC following, may be moving to palliative.    "

## 2020-05-08 NOTE — PROGRESS NOTES
Patient transferred to the palliative care unit following goals of care and treatment preferences discussion with Dr. Rayo.  Patient and/or family state goal of care to be comfort and treatment preferences to be geared towards symptom management.  The palliative care team will continue to follow for any further needs.
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
## 2020-05-09 NOTE — PLAN OF CARE
Problem: Patient Care Overview  Goal: Plan of Care Review  Outcome: Ongoing (interventions implemented as appropriate)  Flowsheets (Taken 5/9/2020 1335)  Progress: improving  Plan of Care Reviewed With: patient  Outcome Summary: Pt has been cooperative this shift. Robinol given for secretions and dilaudid for pain. Skin protection measures in place, turning Q2h. Remains on 8L O2 via trach collar. Sitter at bedside for SI. Contact precautions maintained. Will continue to institute comfort measures as needed.

## 2020-05-10 PROBLEM — I67.9 CEREBROVASCULAR SMALL VESSEL DISEASE: Status: ACTIVE | Noted: 2020-01-01

## 2020-05-10 PROBLEM — Z51.5 HOSPICE CARE: Status: ACTIVE | Noted: 2020-01-01

## 2020-05-10 PROBLEM — F43.21 ADJUSTMENT DISORDER WITH DEPRESSED MOOD: Status: ACTIVE | Noted: 2020-01-01

## 2020-05-10 PROBLEM — Z22.322 MRSA COLONIZATION: Status: ACTIVE | Noted: 2020-01-01

## 2020-05-10 PROBLEM — Z91.51 HISTORY OF ATTEMPTED SUICIDE: Status: ACTIVE | Noted: 2020-01-01

## 2020-05-10 PROBLEM — Z93.1 PEG (PERCUTANEOUS ENDOSCOPIC GASTROSTOMY) STATUS (HCC): Status: ACTIVE | Noted: 2020-01-01

## 2020-05-10 PROBLEM — F60.9 PERSONALITY DISORDER (HCC): Status: ACTIVE | Noted: 2020-01-01

## 2020-05-10 PROBLEM — Z87.828 HISTORY OF GUNSHOT WOUND: Status: ACTIVE | Noted: 2020-01-01

## 2020-05-10 PROBLEM — J69.0 ASPIRATION PNEUMONITIS (HCC): Status: ACTIVE | Noted: 2020-01-01

## 2020-05-10 NOTE — PLAN OF CARE
Problem: Patient Care Overview  Goal: Plan of Care Review  Outcome: Ongoing (interventions implemented as appropriate)  Flowsheets  Taken 5/9/2020 2035  Plan of Care Reviewed With: patient  Taken 5/10/2020 0405  Outcome Summary: Pt is receiving dilaudid 0.5 mg, ativan 0.5 mgs and robinul 0.4mgs prior to turns with good results-he has made no effort to communicate and has shown a rapid decline since yesterday. Has not required suctioning or trach care this shift. Oral and lisa-care with repositioning. Will continue to monitor and offer comfort and support per palliative POC.
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
## 2020-05-10 NOTE — CONSULTS
POC update.....[facility] nurse left [facility] and met with the pt's son Eulalio Ellison and [facility] Admission consents obtained. Pt admission to [facility] services completed at this time.  Called 4P and gave update to nurse Wilton MEREDITH (pt's bedside nurse Padmini was in report and unavailable).
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
## 2020-05-10 NOTE — PLAN OF CARE
Problem: Patient Care Overview  Goal: Plan of Care Review  Outcome: Ongoing (interventions implemented as appropriate)  Flowsheets (Taken 5/10/2020 1604)  Progress: declining  Plan of Care Reviewed With: patient  Outcome Summary: Pt medicated prior to turns d/t pain with turns. Pt appears to be resting comfortably, responsive to pain only. WIll continue comfort measures.

## 2020-05-10 NOTE — PLAN OF CARE
Problem: Patient Care Overview  Goal: Plan of Care Review  Outcome: Ongoing (interventions implemented as appropriate)  Flowsheets (Taken 5/9/2020 2012)  Progress: declining  Plan of Care Reviewed With: patient  Outcome Summary: Pt. has been drowsy throughout the day.  Dilaudid and ativan were given when the pt. requested pain medicine.  Robinul given for congestion.  Trach care provided. Will continue to monitor.
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
## 2020-05-10 NOTE — CONSULTS
[facility] nurse Bee Andersen obtained report from the pt's bedside nurse Padmini, pt assessed, MR reviewed, MD order verified.  Reviewed medical eligibility with [facility] MD Dr. Justino Guido; per Dr. Pham pt is medically eligible for hospice care and MD gave approval for SB admission.  Update given to the pt's bedside nurse Padmini and unit charge nurse Carrillo.  Padmini contacted hospital attending Dr. Rayo and obtained orders for pt discharge/re-admit to [facility] SB once consents obtained.  [facility] nurse to meet with the pt's son Eulalio Suresh today (05/09/2020) to obtain consents.  Thank you for allowing us to be a part of Mr. Suresh's care.  Please call with any questions, needs, or changes to the POC.    [phone]  Patient ID#: [id number]    Bee Andersen RN, BSN  [facility] Referral and Admissions Coordinator
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
## 2020-05-11 NOTE — PROGRESS NOTES
Case Management Discharge Note      Final Note: The patient  on 2020 @ 06:21. BCamym Marshall RN CCP         Destination - Selection Complete      Service Provider Request Status Selected Services Address Phone Number Fax Number    [facility] Selected Inpatient Hospice [address], [place] [id number] [phone]      Durable Medical Equipment      No service has been selected for the patient.      Dialysis/Infusion      No service has been selected for the patient.      Home Medical Care      No service has been selected for the patient.      Therapy      No service has been selected for the patient.      Community Resources      No service has been selected for the patient.             Final Discharge Disposition Code: 41 -  in medical facility

## 2020-05-11 NOTE — PROGRESS NOTES
Discharge Planning Assessment  [facility]     Patient Name: Ajith Suresh  MRN: [id number]  Today's Date: 2020    Admit Date: 2020    Discharge Needs Assessment    No documentation.       Discharge Plan     Row Name 20 1605       Plan    Final Discharge Disposition Code  41 -  in medical facility    Final Note  The patient  on 2020 @ 06:21. BCammy Marshall RN CCP        Destination - Selection Complete      Service Provider Request Status Selected Services Address Phone Number Fax Number    [facility] Selected Inpatient Hospice [address] [place] [id number] [phone]      Durable Medical Equipment      Coordination has not been started for this encounter.      Dialysis/Infusion      Coordination has not been started for this encounter.      Home Medical Care      Coordination has not been started for this encounter.      Therapy      Coordination has not been started for this encounter.      Community Resources      Coordination has not been started for this encounter.        Expected Discharge Date and Time     Expected Discharge Date Expected Discharge Time    May 11, 2020         Demographic Summary    No documentation.       Functional Status    No documentation.       Psychosocial    No documentation.       Abuse/Neglect    No documentation.       Legal    No documentation.       Substance Abuse    No documentation.       Patient Forms    No documentation.           Gris Marshall, SOSA

## 2020-05-11 NOTE — PROGRESS NOTES
Discharge Planning Assessment  [facility]     Patient Name: Ajith Suresh  MRN: [id number]  Today's Date: 5/11/2020    Admit Date: 4/23/2020    Discharge Needs Assessment    No documentation.       Discharge Plan     Row Name 05/11/20 0934       Plan    Plan Comments  The patient transferred to [facility] from [address] [place] [address] on 5/8/2020. The patient was palliative. VIRI Marshall Rn CCP    Final Discharge Disposition Code  51 - hospice medical facility    Final Note  The patient was admitted to a [facility] scattered bed on 5/9/2020. VIRI Marshall RN CCP        Destination - Selection Complete      Service Provider Request Status Selected Services Address Phone Number Fax Number    [facility]patient [facility]ice [address] [id number] [phone]    [facility] Pending - Request Sent N/A [address] [facility] [id number] [phone]    [facility] Declined  Registered Sex Offender N/A [address] [facility] [address] [phone]    [facility] Declined  Registered Sex Offender N/A [address] [facility] [address] [phone]    [facility] Declined  Registered Sex Offender N/A [address] [facility] [address] [phone]    [facility] Declined  Registered Sex Offender N/A [address] [facility] [address] [phone]    [facility] Declined  no isolation bed N/A [address] [facility] [address] [phone]    [place] [facility] Declined  Registered Sex Offender N/A [address] [place] [facility] [id number] [phone]    [facility] Declined  Registered Sex Offender N/A [address] [facility] [address] [phone]    [facility]  Registered Sex Offender N/A [address],  [facility] [address] [phone]    [facility] Declined  Behavior Issues N/A [address] [facility] [facility] [id number] [phone]    [facility] Declined  Registered Sex Offender N/A [address], [facility] [address] [phone]    [facility]  Behavior Issues N/A [address], [facility] [address] [phone]    [facility] Declined  Registered Sex Offender N/A [address], [facility] [id number] [phone]      Durable Medical Equipment      Coordination has not been started for this encounter.      Dialysis/Infusion      Service Provider Request Status Selected Services Address Phone Number Fax Number    [facility] Accepted N/A [address] [facility] [address], [facility] [address] [phone]      Home Medical Care      Service Provider Request Status Selected Services Address Phone Number Fax Number    [facility] Accepted N/A [address] [facility] [address], [facility] [address] [phone]    [facility] Accepted N/A [address] [facility] [id number] [phone]    [facility] Declined  Noncompliance N/A [address], [facility] [id number] [phone]    [facility] [place] Declined N/A [address] [facility] [address] [id number] [phone]    [facility] Declined  Unable to Meet Patient Needs N/A [address], [facility] [id number] [phone]    [facility] Declined  unable to accept due to non compliant and not safe at home N/A [address] [facility] [address] [phone]      Therapy      Coordination has not been started for this encounter.      Community Resources       Coordination has not been started for this encounter.        Expected Discharge Date and Time     Expected Discharge Date Expected Discharge Time    May 9, 2020         Demographic Summary    No documentation.       Functional Status    No documentation.       Psychosocial    No documentation.       Abuse/Neglect    No documentation.       Legal    No documentation.       Substance Abuse    No documentation.       Patient Forms    No documentation.           Gris Marshall RN
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
## 2020-05-11 NOTE — PROGRESS NOTES
Case Management Discharge Note      Final Note: The patient was admitted to a [facility] scattered bed on 5/9/2020. VIRI Marshall, SOSA CCP         Destination - Selection Complete      Service Provider Request Status Selected Services Address Phone Number Fax Number    [facility] Selected Inpatient Hospice [address], [facility] [address] [phone]      Durable Medical Equipment      No service has been selected for the patient.      Dialysis/Infusion      No service has been selected for the patient.      Home Medical Care      No service has been selected for the patient.      Therapy      No service has been selected for the patient.      Community Resources      No service has been selected for the patient.             Final Discharge Disposition Code: 51 - hospice medical facility

## 2020-05-11 NOTE — PROGRESS NOTES
Case Management Discharge Note      Final Note: The patient  on 2020 @ 06:21. BCammy Marshall RN CCP         Destination - Selection Complete      Service Provider Request Status Selected Services Address Phone Number Fax Number    [facility] Selected Inpatient Hospice [address], [place] [id number] [phone]      Durable Medical Equipment      No service has been selected for the patient.      Dialysis/Infusion      No service has been selected for the patient.      Home Medical Care      No service has been selected for the patient.      Therapy      No service has been selected for the patient.      Community Resources      No service has been selected for the patient.             Final Discharge Disposition Code: 41 -  in medical facility

## 2020-05-11 NOTE — PLAN OF CARE
Problem: Patient Care Overview  Goal: Plan of Care Review  Flowsheets  Taken 5/11/2020 0410  Progress: declining  Taken 5/10/2020 2035  Plan of Care Reviewed With: patient  Note:   Pt is unresponsive. Medicated for comfort prior to Q4 turns. Continue comfort care.
